# Patient Record
Sex: FEMALE | Race: WHITE | NOT HISPANIC OR LATINO | Employment: FULL TIME | URBAN - METROPOLITAN AREA
[De-identification: names, ages, dates, MRNs, and addresses within clinical notes are randomized per-mention and may not be internally consistent; named-entity substitution may affect disease eponyms.]

---

## 2017-01-26 ENCOUNTER — TRANSCRIBE ORDERS (OUTPATIENT)
Dept: INTERNAL MEDICINE | Facility: CLINIC | Age: 52
End: 2017-01-26

## 2017-01-26 DIAGNOSIS — Z12.31 VISIT FOR SCREENING MAMMOGRAM: Primary | ICD-10-CM

## 2017-02-03 ENCOUNTER — HOSPITAL ENCOUNTER (OUTPATIENT)
Dept: MAMMOGRAPHY | Facility: HOSPITAL | Age: 52
Discharge: HOME | End: 2017-02-03
Admitting: FAMILY MEDICINE

## 2017-02-03 DIAGNOSIS — Z12.31 VISIT FOR SCREENING MAMMOGRAM: ICD-10-CM

## 2017-02-03 PROCEDURE — 77067 SCR MAMMO BI INCL CAD: CPT | Performed by: RADIOLOGY

## 2017-02-03 PROCEDURE — G0202 SCR MAMMO BI INCL CAD: HCPCS

## 2017-02-03 PROCEDURE — 77063 BREAST TOMOSYNTHESIS BI: CPT

## 2017-02-03 PROCEDURE — 77063 BREAST TOMOSYNTHESIS BI: CPT | Performed by: RADIOLOGY

## 2018-07-19 ENCOUNTER — TRANSCRIBE ORDERS (OUTPATIENT)
Dept: ADMINISTRATIVE | Facility: HOSPITAL | Age: 53
End: 2018-07-19

## 2018-07-19 DIAGNOSIS — Z12.31 VISIT FOR SCREENING MAMMOGRAM: Primary | ICD-10-CM

## 2018-07-30 ENCOUNTER — HOSPITAL ENCOUNTER (OUTPATIENT)
Dept: MAMMOGRAPHY | Facility: HOSPITAL | Age: 53
Discharge: HOME | End: 2018-07-30
Admitting: FAMILY MEDICINE

## 2018-07-30 DIAGNOSIS — Z12.31 VISIT FOR SCREENING MAMMOGRAM: ICD-10-CM

## 2018-07-30 PROCEDURE — 77063 BREAST TOMOSYNTHESIS BI: CPT

## 2018-07-30 PROCEDURE — 77067 SCR MAMMO BI INCL CAD: CPT | Performed by: RADIOLOGY

## 2018-07-30 PROCEDURE — 77063 BREAST TOMOSYNTHESIS BI: CPT | Performed by: RADIOLOGY

## 2018-07-30 PROCEDURE — 77067 SCR MAMMO BI INCL CAD: CPT

## 2018-08-07 ENCOUNTER — CONSULT (OUTPATIENT)
Dept: SLEEP MEDICINE | Facility: HOSPITAL | Age: 53
End: 2018-08-07

## 2018-08-07 VITALS
HEART RATE: 82 BPM | HEIGHT: 60 IN | OXYGEN SATURATION: 96 % | SYSTOLIC BLOOD PRESSURE: 120 MMHG | BODY MASS INDEX: 31.33 KG/M2 | WEIGHT: 159.6 LBS | DIASTOLIC BLOOD PRESSURE: 78 MMHG

## 2018-08-07 DIAGNOSIS — E66.9 OBESITY (BMI 30-39.9): ICD-10-CM

## 2018-08-07 DIAGNOSIS — R40.0 SLEEPINESS: Primary | ICD-10-CM

## 2018-08-07 DIAGNOSIS — R29.818 SUSPECTED SLEEP APNEA: ICD-10-CM

## 2018-08-07 PROBLEM — I10 HYPERTENSIVE DISORDER: Status: ACTIVE | Noted: 2017-01-10

## 2018-08-07 PROCEDURE — 99204 OFFICE O/P NEW MOD 45 MIN: CPT | Performed by: INTERNAL MEDICINE

## 2018-08-07 RX ORDER — LOSARTAN POTASSIUM 50 MG/1
TABLET ORAL
COMMUNITY
End: 2020-06-22

## 2018-08-07 RX ORDER — FERROUS SULFATE 325(65) MG
TABLET ORAL
Status: ON HOLD | COMMUNITY
End: 2021-04-22

## 2018-08-07 NOTE — PROGRESS NOTES
Sapna Ng is a 53 y.o. female.   Chief Complaint   Patient presents with   • Sleeping Problem       HPI     53 y.o. female seen in consultation at the request of Referring, Self for evaluation of the above.     She has had increasing daytime somnolence and nonrestorative sleep for several years though it has been significantly worse over the last year.  She has been noted to snore by her family.    Further details are as follows:    [place] Scale is: 6/24    Estimated average amount of sleep per night: 7  Number of times she wakes up at night: 2  Difficulty falling back asleep: sometimes  It usually takes 5 minutes to go to sleep.  She feels sleepy upon waking up: yes  Rotating or night shift work: no    Drowsiness/Sleepiness:  She exhibits the following:  excessive daytime sleepiness  excessive daytime fatigue  falls asleep watching TV  falls asleep during times of the day when she is quiet  difficulty driving due to sleepiness  sleepy even while on vacation  sleepy even when sleep time is increased    Snoring/Breathing:  She exhibits the following:  loud snoring  snores in all sleep positions  awoken with dry mouth    Reflux:  She describes the following:  none    Narcolepsy:  She exhibits the following:  none    RLS/PLMs:  She describes the following:  none    Insomnia:  She describes the following:  frequent awakenings  restless sleep    Parasomnia:  She exhibits the following:  sleep talks    Neurological:  She has a history of the following:  none    Weight:  Weight change in the last year:  gain: 0 lbs      The patient's relevant past medical, surgical, family, and social history reviewed and updated in Epic as appropriate.    Current medications are:   Current Outpatient Prescriptions:   •  Cetirizine HCl (ZYRTEC ALLERGY) 10 MG capsule, Zyrtec 10 mg tablet  Take 1 tablet every day by oral route., Disp: , Rfl:   •  ferrous sulfate (IRON SUPPLEMENT) 325 (65 FE) MG tablet, ferrous sulfate 325 mg (65 mg  "iron) tablet  Take 1 tablet every day by oral route., Disp: , Rfl:   •  losartan (COZAAR) 50 MG tablet, losartan 50 mg tablet, Disp: , Rfl: .    Review of Systems    Review of Systems  ROS documented in patient questionnaire ×12 systems.  Reviewed with patient.  Otherwise negative except as noted in HPI.    Physical Exam    Blood pressure 120/78, pulse 82, height 152.4 cm (60\"), weight 72.4 kg (159 lb 9.6 oz), last menstrual period 07/14/2018, SpO2 96 %. Body mass index is 31.17 kg/m².    Physical Exam   Constitutional: She is oriented to person, place, and time. She appears well-developed and well-nourished.   HENT:   Head: Normocephalic and atraumatic.   Nose: Nose normal.   Mouth/Throat: Oropharynx is clear and moist. No oropharyngeal exudate.   Class IV airway   Eyes: Conjunctivae are normal. No scleral icterus.   Neck: No tracheal deviation present. No thyromegaly present.   Cardiovascular: Normal rate and regular rhythm.  Exam reveals no gallop and no friction rub.    No murmur heard.  Pulmonary/Chest: Effort normal. No respiratory distress. She has no wheezes. She has no rales.   Musculoskeletal: She exhibits no edema or deformity.   Neurological: She is alert and oriented to person, place, and time.   Skin: Skin is warm and dry. No rash noted.   Psychiatric: She has a normal mood and affect. Her behavior is normal.   Nursing note and vitals reviewed.      ASSESSMENT:    Problem List Items Addressed This Visit     Sleepiness - Primary    Relevant Orders    Home Sleep Study    Suspected sleep apnea    Relevant Orders    Home Sleep Study    Obesity (BMI 30-39.9)    Relevant Orders    Home Sleep Study          Suspicion of obstructive sleep apnea based upon snoring and nonrestorative sleep.    PLAN:    1. I discussed the diagnosis of obstructive sleep apnea as well as the diagnostic process and potential treatments with the patient.  2. She is agreeable to a home sleep apnea test, which I have ordered  3. We " discussed long-term weight loss  4. She'll be contacted after the study and appropriate follow-up arranged as well as treatment if needed.    I have reviewed the results of my evaluation and impression and discussed my recommendations in detail with the patient.      Signed by  Laith Borden MD    August 7, 2018      CC: Letty Diaz MD          Referring, Self

## 2018-09-07 ENCOUNTER — HOSPITAL ENCOUNTER (OUTPATIENT)
Dept: SLEEP MEDICINE | Facility: HOSPITAL | Age: 53
Discharge: HOME | End: 2018-09-07
Attending: INTERNAL MEDICINE | Admitting: INTERNAL MEDICINE

## 2018-09-07 VITALS
DIASTOLIC BLOOD PRESSURE: 77 MMHG | HEART RATE: 66 BPM | BODY MASS INDEX: 31.86 KG/M2 | OXYGEN SATURATION: 98 % | WEIGHT: 162.26 LBS | HEIGHT: 60 IN | SYSTOLIC BLOOD PRESSURE: 137 MMHG

## 2018-09-07 DIAGNOSIS — E66.9 OBESITY (BMI 30-39.9): ICD-10-CM

## 2018-09-07 DIAGNOSIS — R29.818 SUSPECTED SLEEP APNEA: ICD-10-CM

## 2018-09-07 DIAGNOSIS — R40.0 SLEEPINESS: ICD-10-CM

## 2018-09-07 PROCEDURE — 95806 SLEEP STUDY UNATT&RESP EFFT: CPT | Performed by: INTERNAL MEDICINE

## 2018-09-07 PROCEDURE — 95806 SLEEP STUDY UNATT&RESP EFFT: CPT

## 2018-09-12 DIAGNOSIS — G47.33 MODERATE OBSTRUCTIVE SLEEP APNEA: Primary | ICD-10-CM

## 2018-09-12 NOTE — PROGRESS NOTES
CALLED PATIENT AND ADVISED OF STUDY RESULTS. PATIENT VERBALIZED UNDERSTANDING AND WAS AGREEABLE TO PAP THERAPY. FAXED ORDER TO JOLLY PEREZ 09/12/18

## 2018-11-14 ENCOUNTER — OFFICE VISIT (OUTPATIENT)
Dept: SLEEP MEDICINE | Facility: HOSPITAL | Age: 53
End: 2018-11-14

## 2018-11-14 VITALS
BODY MASS INDEX: 31.92 KG/M2 | HEART RATE: 64 BPM | OXYGEN SATURATION: 99 % | SYSTOLIC BLOOD PRESSURE: 126 MMHG | HEIGHT: 60 IN | DIASTOLIC BLOOD PRESSURE: 69 MMHG | WEIGHT: 162.6 LBS

## 2018-11-14 DIAGNOSIS — G47.34 NOCTURNAL HYPOXEMIA: ICD-10-CM

## 2018-11-14 DIAGNOSIS — G47.33 OSA (OBSTRUCTIVE SLEEP APNEA): Primary | ICD-10-CM

## 2018-11-14 PROCEDURE — 99214 OFFICE O/P EST MOD 30 MIN: CPT | Performed by: NURSE PRACTITIONER

## 2018-11-14 RX ORDER — MONTELUKAST SODIUM 10 MG/1
10 TABLET ORAL NIGHTLY
Status: ON HOLD | COMMUNITY
End: 2021-04-22

## 2018-11-14 NOTE — PATIENT INSTRUCTIONS
Hypoxemia  Hypoxemia occurs when the blood does not contain enough oxygen. The body cannot work well when it does not have enough oxygen because every part of the body needs oxygen. Oxygen enters the lungs when we breathe in, then it travels to all parts of the body through the blood. Hypoxemia can develop suddenly or slowly.  What are the causes?  Common causes of this condition include:  · Long-term (chronic) lung diseases, such as chronic obstructive pulmonary disease (COPD) or interstitial lung disease.  · Disorders that affect breathing at night, such as sleep apnea.  · Fluid buildup in the lungs (pulmonary edema).  · Lung infection (pneumonia).  · Lung or throat cancer.  · Abnormal blood flow that bypasses the lungs (having a shunt).  · Certain diseases that affect nerves or muscles.  · A collapsed lung (pneumothorax).  · A blood clot in the lungs (pulmonary embolus).  · Certain types of heart disease.  · Slow or shallow breathing (hypoventilation).  · Certain medicines.  · High altitudes.  · Toxic chemicals, smoke, and gases.    What are the signs or symptoms?  In some cases, there may be no symptoms of this condition. If you do have symptoms, they may include:  · Shortness of breath (dyspnea).  · Bluish color of the skin, lips, or nail beds.  · Breathing that is fast, noisy, or shallow.  · A fast heartbeat.  · Feeling tired or sleepy.  · Feeling confused or worried.    If hypoxemia develops quickly, you will likely have dyspnea. If hypoxemia develops slowly over months or years, you may not notice any symptoms.  How is this diagnosed?  This condition is diagnosed by:  · A physical exam.  · Blood tests.  · A test that measures the percentage of oxygen in your blood (pulse oximetry). This is done with a sensor that is placed on your finger, toe, or earlobe.    How is this treated?  Treatment for this condition depends on the underlying cause of your hypoxemia. You will likely be treated with oxygen therapy to  restore your blood oxygen level. Depending on the cause of your hypoxemia, you may need oxygen therapy for a short time (weeks or months), or you may need it for the rest of your life.  Your health care provider may also recommend other therapies to treat the underlying cause of your hypoxemia.  Follow these instructions at home:  · Take over-the-counter and prescription medicines only as told by your health care provider.  · If you are on oxygen therapy, follow oxygen safety precautions as directed by your health care provider. These may include:  ? Always having a backup supply of oxygen.  ? Not allowing anyone to smoke or have a fire around oxygen.  ? Handling oxygen tanks carefully and as instructed.  · Do not use any products that contain nicotine or tobacco, such as cigarettes and e-cigarettes. If you need help quitting, ask your health care provider. Stay away from people who smoke.  · Keep all follow-up visits as told by your health care provider. This is important.  Contact a health care provider if:  · You have any concerns about your oxygen therapy.  · You have trouble breathing, even during or after treatment.  · You become short of breath when you exercise.  · You are tired when you wake up.  · You have a headache when you wake up.  Get help right away if:  · Your shortness of breath gets worse, especially with normal or minimal activity.  · You have a bluish color of the skin, lips, or nail beds.  · You become confused or you cannot think properly.  · You cough up dark mucus or blood.  · You have chest pain.  · You have a fever.  Summary  · Hypoxemia occurs when the blood does not contain enough oxygen.  · Hypoxemia may or may not cause symptoms. Often, the main symptom is shortness of breath (dyspnea).  · Depending on the cause of your hypoxemia, you may need oxygen therapy for a short time (weeks or months), or you may need it for the rest of your life.  · If you are on oxygen therapy, follow oxygen  safety precautions as directed by your health care provider.  This information is not intended to replace advice given to you by your health care provider. Make sure you discuss any questions you have with your health care provider.  Document Released: 07/02/2012 Document Revised: 11/21/2017 Document Reviewed: 11/21/2017  [employer] Interactive Patient Education © 2017 [employer]  Sleep Apnea  Sleep apnea is a condition that affects breathing. People with sleep apnea have moments during sleep when their breathing pauses briefly or gets shallow. Sleep apnea can cause these symptoms:  · Trouble staying asleep.  · Sleepiness or tiredness during the day.  · Irritability.  · Loud snoring.  · Morning headaches.  · Trouble concentrating.  · Forgetting things.  · Less interest in sex.  · Being sleepy for no reason.  · Mood swings.  · Personality changes.  · Depression.  · Waking up a lot during the night to pee (urinate).  · Dry mouth.  · Sore throat.    Follow these instructions at home:  · Make any changes in your routine that your doctor recommends.  · Eat a healthy, well-balanced diet.  · Take over-the-counter and prescription medicines only as told by your doctor.  · Avoid using alcohol, calming medicines (sedatives), and narcotic medicines.  · Take steps to lose weight if you are overweight.  · If you were given a machine (device) to use while you sleep, use it only as told by your doctor.  · Do not use any tobacco products, such as cigarettes, chewing tobacco, and e-cigarettes. If you need help quitting, ask your doctor.  · Keep all follow-up visits as told by your doctor. This is important.  Contact a doctor if:  · The machine that you were given to use during sleep is uncomfortable or does not seem to be working.  · Your symptoms do not get better.  · Your symptoms get worse.  Get help right away if:  · Your chest hurts.  · You have trouble breathing in enough air (shortness of breath).  · You have an  uncomfortable feeling in your back, arms, or stomach.  · You have trouble talking.  · One side of your body feels weak.  · A part of your face is hanging down (drooping).  These symptoms may be an emergency. Do not wait to see if the symptoms will go away. Get medical help right away. Call your local emergency services (911 in the [place] Do not drive yourself to the hospital.  This information is not intended to replace advice given to you by your health care provider. Make sure you discuss any questions you have with your health care provider.  Document Released: 09/26/2009 Document Revised: 08/13/2017 Document Reviewed: 09/26/2016  [employer] Interactive Patient Education © 2018 [employer]

## 2018-11-14 NOTE — PROGRESS NOTES
Subjective:   Follow-up      Chief Complaint:   Chief Complaint   Patient presents with   • Follow-up       HPI:    Sapna Ng is a 53 y.o. female here for follow-up of ayanna.  Patient was seen here in consult 8/7/18.  She was having excessive sleepiness and snoring.  She did have study on 9/8/18 that showed obstructive sleep apnea as well as 6 minutes of nocturnal hypoxemia at 85% saturation.  She is sleeping 6-8 hours nightly and her [place] score is 4/24.  Patient states she feels 100% better since starting CPAP therapy and no longer feels foggy upon awakening.  She does not nap.  She does not snore.  She has happy at current settings and wishes to continue CPAP therapy.        Current medications are:   Current Outpatient Medications:   •  Cetirizine HCl (ZYRTEC ALLERGY) 10 MG capsule, Zyrtec 10 mg tablet  Take 1 tablet every day by oral route., Disp: , Rfl:   •  ferrous sulfate (IRON SUPPLEMENT) 325 (65 FE) MG tablet, ferrous sulfate 325 mg (65 mg iron) tablet  Take 1 tablet every day by oral route., Disp: , Rfl:   •  losartan (COZAAR) 50 MG tablet, losartan 50 mg tablet, Disp: , Rfl:   •  montelukast (SINGULAIR) 10 MG tablet, Take 10 mg by mouth Every Night., Disp: , Rfl: .      The patient's relevant past medical, surgical, family and social history were reviewed and updated in Epic as appropriate.       Review of Systems   Eyes: Positive for visual disturbance.   Respiratory: Positive for apnea.    Allergic/Immunologic: Positive for environmental allergies.   Psychiatric/Behavioral: Positive for sleep disturbance.   All other systems reviewed and are negative.        Objective:    Physical Exam   Constitutional: She appears well-developed and well-nourished.   HENT:   Head: Normocephalic and atraumatic.   Mouth/Throat: Oropharynx is clear and moist.   Mallampati 4 anatomy   Eyes: Conjunctivae are normal.   Cardiovascular: Normal rate and regular rhythm.   Pulmonary/Chest: Effort normal and breath sounds  normal.   Skin: Skin is warm and dry.   Psychiatric: She has a normal mood and affect. Her behavior is normal. Judgment and thought content normal.   Nursing note and vitals reviewed.  Download and compliance has been reviewed with patient.  Her AHI is controlled 0.6.  Greater than 4 hour usage 56.7%.  90% pressure 9.8 cm H2O.      ASSESSMENT/PLAN    Sapna was seen today for follow-up.    Diagnoses and all orders for this visit:    DORITA (obstructive sleep apnea)  -     CPAP Therapy  -     Overnight Sleep Oximetry Study; Future    Nocturnal hypoxemia  -     Overnight Sleep Oximetry Study; Future            1. Counseled patient regarding multimodal approach with healthy nutrition, healthy sleep, regular physical activity, social activities, counseling, and medications. Encouraged to practice lateral sleep position. Avoid alcohol and sedatives close to bedtime.  2.   Refill supplies ×1 year.  Order for 24-hour pulse oximetry to make sure nocturnal hypoxemia has been corrected.  I will call her with those results.  I will see patient back in 6 months to reassess download and compliance.  Encouraged increased use of CPAP therapy over 4 hours.  I have reviewed the results of my evaluation and impression and discussed my recommendations in detail with the patient.      Signed by  Jennie Live, APRN    November 14, 2018      CC: Letty Diaz MD          No ref. provider found

## 2018-11-21 DIAGNOSIS — G47.34 NOCTURNAL HYPOXEMIA: ICD-10-CM

## 2018-11-21 DIAGNOSIS — G47.33 OSA (OBSTRUCTIVE SLEEP APNEA): ICD-10-CM

## 2018-11-26 ENCOUNTER — TELEPHONE (OUTPATIENT)
Dept: SLEEP MEDICINE | Facility: HOSPITAL | Age: 53
End: 2018-11-26

## 2018-11-26 NOTE — TELEPHONE ENCOUNTER
----- Message from WESTLEY العراقي sent at 11/26/2018  9:42 AM EST -----  cpap has resolved low night time oxygen

## 2018-12-03 ENCOUNTER — TELEPHONE (OUTPATIENT)
Dept: SLEEP MEDICINE | Facility: HOSPITAL | Age: 53
End: 2018-12-03

## 2019-06-20 ENCOUNTER — OFFICE VISIT (OUTPATIENT)
Dept: SLEEP MEDICINE | Facility: HOSPITAL | Age: 54
End: 2019-06-20

## 2019-06-20 VITALS
BODY MASS INDEX: 32.47 KG/M2 | SYSTOLIC BLOOD PRESSURE: 105 MMHG | DIASTOLIC BLOOD PRESSURE: 68 MMHG | OXYGEN SATURATION: 97 % | WEIGHT: 165.4 LBS | HEART RATE: 68 BPM | HEIGHT: 60 IN

## 2019-06-20 DIAGNOSIS — G47.33 OSA (OBSTRUCTIVE SLEEP APNEA): Primary | ICD-10-CM

## 2019-06-20 PROCEDURE — 99212 OFFICE O/P EST SF 10 MIN: CPT | Performed by: NURSE PRACTITIONER

## 2019-06-20 NOTE — PATIENT INSTRUCTIONS

## 2019-06-20 NOTE — PROGRESS NOTES
Subjective: Follow-up        Chief Complaint:   Chief Complaint   Patient presents with   • Follow-up       HPI:    Sapna Ng is a 53 y.o. female here for follow-up of obstructive sleep apnea.  Patient was last seen 11/14/2018.  Patient states she is doing well with CPAP therapy.  Patient is sleeping 7 hours nightly and feels refreshed upon awakening.  Patient has an [place] score of 4/24.  Patient is doing well with current settings and has no concerns today.  Of note patient has not changed her mask in approximately 9 months.  She does know that she will need to do this on a regular basis.        Current medications are:   Current Outpatient Medications:   •  Cetirizine HCl (ZYRTEC ALLERGY) 10 MG capsule, Zyrtec 10 mg tablet  Take 1 tablet every day by oral route., Disp: , Rfl:   •  ferrous sulfate (IRON SUPPLEMENT) 325 (65 FE) MG tablet, ferrous sulfate 325 mg (65 mg iron) tablet  Take 1 tablet every day by oral route., Disp: , Rfl:   •  losartan (COZAAR) 50 MG tablet, losartan 50 mg tablet, Disp: , Rfl:   •  montelukast (SINGULAIR) 10 MG tablet, Take 10 mg by mouth Every Night., Disp: , Rfl: .      The patient's relevant past medical, surgical, family and social history were reviewed and updated in Epic as appropriate.       Review of Systems   HENT: Positive for postnasal drip.    Eyes: Positive for visual disturbance.   Respiratory: Positive for apnea.    Allergic/Immunologic: Positive for environmental allergies.   Psychiatric/Behavioral: Positive for sleep disturbance.   All other systems reviewed and are negative.        Objective:    Physical Exam   Constitutional: She is oriented to person, place, and time. She appears well-developed and well-nourished.   HENT:   Head: Normocephalic and atraumatic.   Right Ear: External ear normal.   Left Ear: External ear normal.   Mouth/Throat: Oropharynx is clear and moist.   Mallampati 4 anatomy   Eyes: Conjunctivae are normal.   Neck: Neck supple. No  thyromegaly present.   Cardiovascular: Normal rate and regular rhythm.   Pulmonary/Chest: Effort normal and breath sounds normal.   Lymphadenopathy:     She has no cervical adenopathy.   Neurological: She is alert and oriented to person, place, and time.   Skin: Skin is warm and dry.   Psychiatric: She has a normal mood and affect. Her behavior is normal. Judgment and thought content normal.   Nursing note and vitals reviewed.    140/1 80 days of use.  Greater than 4-hour use 67.2%.  90% pressure 9.7.  AHI of 0.8.  Download reviewed with patient.    ASSESSMENT/PLAN    Sapna was seen today for follow-up.    Diagnoses and all orders for this visit:    DORITA (obstructive sleep apnea)  -     CPAP Therapy            1. Counseled patient regarding multimodal approach with healthy nutrition, healthy sleep, regular physical activity, social activities, counseling, and medications. Encouraged to practice sleep refill supplies x1 year.  Return to clinic 1 year or sooner if symptoms warrant.  Patient does need to change her mask on a regular basis.  Increase over 4-hour use.  Sleep position. Avoid alcohol and sedatives close to bedtime.  2.     I have reviewed the results of my evaluation and impression and discussed my recommendations in detail with the patient.      Signed by  WESTLEY Wong    June 20, 2019      CC: Letty Diaz MD          No ref. provider found

## 2019-10-21 ENCOUNTER — TRANSCRIBE ORDERS (OUTPATIENT)
Dept: ADMINISTRATIVE | Facility: HOSPITAL | Age: 54
End: 2019-10-21

## 2019-10-21 DIAGNOSIS — Z12.31 VISIT FOR SCREENING MAMMOGRAM: Primary | ICD-10-CM

## 2019-12-11 ENCOUNTER — HOSPITAL ENCOUNTER (OUTPATIENT)
Dept: MAMMOGRAPHY | Facility: HOSPITAL | Age: 54
Discharge: HOME | End: 2019-12-11
Admitting: FAMILY MEDICINE

## 2019-12-11 DIAGNOSIS — Z12.31 VISIT FOR SCREENING MAMMOGRAM: ICD-10-CM

## 2019-12-11 PROCEDURE — 77063 BREAST TOMOSYNTHESIS BI: CPT | Performed by: RADIOLOGY

## 2019-12-11 PROCEDURE — 77067 SCR MAMMO BI INCL CAD: CPT

## 2019-12-11 PROCEDURE — 77063 BREAST TOMOSYNTHESIS BI: CPT

## 2019-12-11 PROCEDURE — 77067 SCR MAMMO BI INCL CAD: CPT | Performed by: RADIOLOGY

## 2020-06-22 ENCOUNTER — TELEMEDICINE (OUTPATIENT)
Dept: SLEEP MEDICINE | Facility: HOSPITAL | Age: 55
End: 2020-06-22

## 2020-06-22 VITALS — BODY MASS INDEX: 32.39 KG/M2 | HEIGHT: 60 IN | WEIGHT: 165 LBS

## 2020-06-22 DIAGNOSIS — G47.33 OSA (OBSTRUCTIVE SLEEP APNEA): Primary | ICD-10-CM

## 2020-06-22 PROCEDURE — 99212 OFFICE O/P EST SF 10 MIN: CPT | Performed by: NURSE PRACTITIONER

## 2020-06-22 NOTE — PROGRESS NOTES
Chief Complaint:   Chief Complaint   Patient presents with   • Follow-up       HPI:    Sapna Ng is a 54 y.o. female here for follow-up of sleep apnea.  Patient was last seen 5/20/2019.  Patient states she is doing well with CPAP therapy.  Patient is sleeping 7+ hours nightly and does feel refreshed upon awakening.  It will take patient less than 5 minutes to go to sleep and she does not get up during the night.  Patient has an [place] score of 4/24.  Patient has no complaints of skin irritation from the mask, difficulty putting mask on or off, no problem with air leak, no dry mucosa, or no difficulty breathing/shortness of breath while wearing CPAP.  Patient does wish to continue with CPAP therapy.  Past Medical History:   Diagnosis Date   • PPD positive, treated            Current medications are:   Current Outpatient Medications:   •  Cetirizine HCl (ZYRTEC ALLERGY) 10 MG capsule, Zyrtec 10 mg tablet  Take 1 tablet every day by oral route., Disp: , Rfl:   •  ferrous sulfate (IRON SUPPLEMENT) 325 (65 FE) MG tablet, ferrous sulfate 325 mg (65 mg iron) tablet  Take 1 tablet every day by oral route., Disp: , Rfl:   •  montelukast (SINGULAIR) 10 MG tablet, Take 10 mg by mouth Every Night., Disp: , Rfl: .      The patient's relevant past medical, surgical, family and social history were reviewed and updated in Epic as appropriate.       Review of Systems   Eyes: Positive for visual disturbance.   Respiratory: Positive for apnea.    Allergic/Immunologic: Positive for environmental allergies.   Psychiatric/Behavioral: Positive for sleep disturbance.   All other systems reviewed and are negative.        Objective:    Physical Exam   Constitutional: She is oriented to person, place, and time. She appears well-developed and well-nourished.   HENT:   Head: Normocephalic and atraumatic.   Class 4 airway   Neck: No tracheal deviation present.   Pulmonary/Chest: Effort normal.   Neurological: She is alert and  oriented to person, place, and time.   Skin: No erythema. No pallor.   Psychiatric: She has a normal mood and affect. Her behavior is normal. Judgment and thought content normal.     88/90 days of use.  Greater than 4-hour use 90%.  90% pressure 10.0.  AHI 0.6.  Settings 8 to 20 cm H2O.  Download reviewed with patient.    ASSESSMENT/PLAN    Sapna was seen today for follow-up.    Diagnoses and all orders for this visit:    DORITA (obstructive sleep apnea)  -     CPAP Therapy            1. Counseled patient regarding multimodal approach with healthy nutrition, healthy sleep, regular physical activity, social activities, counseling, and medications. Encouraged to practice  lateralsleep position. Avoid alcohol and sedatives close to bedtime.  2. Refill supplies x1 year.  Return to clinic 1 year or sooner if symptoms warrant.  3. Verbal consent was given we did move forward with video visit.    I have reviewed the results of my evaluation and impression and discussed my recommendations in detail with the patient.      Signed by  WESTLEY Wong    June 22, 2020      CC: Letty Diaz MD          No ref. provider found

## 2021-01-12 ENCOUNTER — TRANSCRIBE ORDERS (OUTPATIENT)
Dept: ADMINISTRATIVE | Facility: HOSPITAL | Age: 56
End: 2021-01-12

## 2021-01-12 DIAGNOSIS — Z12.31 VISIT FOR SCREENING MAMMOGRAM: Primary | ICD-10-CM

## 2021-01-14 ENCOUNTER — APPOINTMENT (OUTPATIENT)
Dept: MAMMOGRAPHY | Facility: HOSPITAL | Age: 56
End: 2021-01-14

## 2021-04-21 ENCOUNTER — HOSPITAL ENCOUNTER (EMERGENCY)
Facility: HOSPITAL | Age: 56
Discharge: HOME | End: 2021-04-21
Attending: EMERGENCY MEDICINE | Admitting: EMERGENCY MEDICINE

## 2021-04-21 ENCOUNTER — APPOINTMENT (OUTPATIENT)
Dept: GENERAL RADIOLOGY | Facility: HOSPITAL | Age: 56
End: 2021-04-21

## 2021-04-21 VITALS
RESPIRATION RATE: 18 BRPM | BODY MASS INDEX: 32.39 KG/M2 | WEIGHT: 165 LBS | HEIGHT: 60 IN | DIASTOLIC BLOOD PRESSURE: 77 MMHG | HEART RATE: 77 BPM | TEMPERATURE: 99 F | OXYGEN SATURATION: 98 % | SYSTOLIC BLOOD PRESSURE: 141 MMHG

## 2021-04-21 DIAGNOSIS — I10 ELEVATED BLOOD PRESSURE READING WITH DIAGNOSIS OF HYPERTENSION: ICD-10-CM

## 2021-04-21 DIAGNOSIS — R07.9 CHEST PAIN, UNSPECIFIED TYPE: ICD-10-CM

## 2021-04-21 DIAGNOSIS — R07.2 PRECORDIAL PAIN: Primary | ICD-10-CM

## 2021-04-21 DIAGNOSIS — Z82.49 FAMILY HISTORY OF CORONARY ARTERY DISEASE: ICD-10-CM

## 2021-04-21 PROBLEM — E78.2 MIXED HYPERLIPIDEMIA: Status: ACTIVE | Noted: 2021-04-21

## 2021-04-21 PROBLEM — R93.89 ABNORMAL CHEST X-RAY: Status: ACTIVE | Noted: 2021-04-21

## 2021-04-21 LAB
ALBUMIN SERPL-MCNC: 4.2 G/DL (ref 3.5–5.2)
ALBUMIN/GLOB SERPL: 1.6 G/DL
ALP SERPL-CCNC: 61 U/L (ref 39–117)
ALT SERPL W P-5'-P-CCNC: 45 U/L (ref 1–33)
ANION GAP SERPL CALCULATED.3IONS-SCNC: 6 MMOL/L (ref 5–15)
AST SERPL-CCNC: 29 U/L (ref 1–32)
BASOPHILS # BLD AUTO: 0.03 10*3/MM3 (ref 0–0.2)
BASOPHILS NFR BLD AUTO: 0.4 % (ref 0–1.5)
BILIRUB SERPL-MCNC: 0.3 MG/DL (ref 0–1.2)
BUN SERPL-MCNC: 12 MG/DL (ref 6–20)
BUN/CREAT SERPL: 14.8 (ref 7–25)
CALCIUM SPEC-SCNC: 8.8 MG/DL (ref 8.6–10.5)
CHLORIDE SERPL-SCNC: 104 MMOL/L (ref 98–107)
CO2 SERPL-SCNC: 28 MMOL/L (ref 22–29)
CREAT SERPL-MCNC: 0.81 MG/DL (ref 0.57–1)
DEPRECATED RDW RBC AUTO: 39.4 FL (ref 37–54)
EOSINOPHIL # BLD AUTO: 0.2 10*3/MM3 (ref 0–0.4)
EOSINOPHIL NFR BLD AUTO: 2.8 % (ref 0.3–6.2)
ERYTHROCYTE [DISTWIDTH] IN BLOOD BY AUTOMATED COUNT: 11.8 % (ref 12.3–15.4)
GFR SERPL CREATININE-BSD FRML MDRD: 73 ML/MIN/1.73
GLOBULIN UR ELPH-MCNC: 2.6 GM/DL
GLUCOSE SERPL-MCNC: 98 MG/DL (ref 65–99)
HCT VFR BLD AUTO: 32.8 % (ref 34–46.6)
HGB BLD-MCNC: 11.4 G/DL (ref 12–15.9)
HOLD SPECIMEN: NORMAL
IMM GRANULOCYTES # BLD AUTO: 0.03 10*3/MM3 (ref 0–0.05)
IMM GRANULOCYTES NFR BLD AUTO: 0.4 % (ref 0–0.5)
LIPASE SERPL-CCNC: 32 U/L (ref 13–60)
LYMPHOCYTES # BLD AUTO: 1.41 10*3/MM3 (ref 0.7–3.1)
LYMPHOCYTES NFR BLD AUTO: 19.8 % (ref 19.6–45.3)
MCH RBC QN AUTO: 31.8 PG (ref 26.6–33)
MCHC RBC AUTO-ENTMCNC: 34.8 G/DL (ref 31.5–35.7)
MCV RBC AUTO: 91.4 FL (ref 79–97)
MONOCYTES # BLD AUTO: 0.61 10*3/MM3 (ref 0.1–0.9)
MONOCYTES NFR BLD AUTO: 8.6 % (ref 5–12)
NEUTROPHILS NFR BLD AUTO: 4.83 10*3/MM3 (ref 1.7–7)
NEUTROPHILS NFR BLD AUTO: 68 % (ref 42.7–76)
NRBC BLD AUTO-RTO: 0 /100 WBC (ref 0–0.2)
NT-PROBNP SERPL-MCNC: 26.9 PG/ML (ref 0–900)
PLATELET # BLD AUTO: 271 10*3/MM3 (ref 140–450)
PMV BLD AUTO: 11.3 FL (ref 6–12)
POTASSIUM SERPL-SCNC: 3.8 MMOL/L (ref 3.5–5.2)
PROT SERPL-MCNC: 6.8 G/DL (ref 6–8.5)
QT INTERVAL: 394 MS
QT INTERVAL: 408 MS
QTC INTERVAL: 425 MS
QTC INTERVAL: 433 MS
RBC # BLD AUTO: 3.59 10*6/MM3 (ref 3.77–5.28)
SODIUM SERPL-SCNC: 138 MMOL/L (ref 136–145)
TROPONIN T SERPL-MCNC: <0.01 NG/ML (ref 0–0.03)
TROPONIN T SERPL-MCNC: <0.01 NG/ML (ref 0–0.03)
WBC # BLD AUTO: 7.11 10*3/MM3 (ref 3.4–10.8)
WHOLE BLOOD HOLD SPECIMEN: NORMAL
WHOLE BLOOD HOLD SPECIMEN: NORMAL

## 2021-04-21 PROCEDURE — 84484 ASSAY OF TROPONIN QUANT: CPT

## 2021-04-21 PROCEDURE — 84484 ASSAY OF TROPONIN QUANT: CPT | Performed by: EMERGENCY MEDICINE

## 2021-04-21 PROCEDURE — 85025 COMPLETE CBC W/AUTO DIFF WBC: CPT

## 2021-04-21 PROCEDURE — 71045 X-RAY EXAM CHEST 1 VIEW: CPT

## 2021-04-21 PROCEDURE — 83880 ASSAY OF NATRIURETIC PEPTIDE: CPT

## 2021-04-21 PROCEDURE — 93005 ELECTROCARDIOGRAM TRACING: CPT | Performed by: EMERGENCY MEDICINE

## 2021-04-21 PROCEDURE — 99284 EMERGENCY DEPT VISIT MOD MDM: CPT | Performed by: INTERNAL MEDICINE

## 2021-04-21 PROCEDURE — 99284 EMERGENCY DEPT VISIT MOD MDM: CPT

## 2021-04-21 PROCEDURE — 83690 ASSAY OF LIPASE: CPT

## 2021-04-21 PROCEDURE — 80053 COMPREHEN METABOLIC PANEL: CPT

## 2021-04-21 PROCEDURE — 93005 ELECTROCARDIOGRAM TRACING: CPT

## 2021-04-21 RX ORDER — ASPIRIN 81 MG/1
81 TABLET ORAL DAILY
Start: 2021-04-21 | End: 2021-05-06 | Stop reason: SDUPTHER

## 2021-04-21 RX ORDER — SODIUM CHLORIDE 0.9 % (FLUSH) 0.9 %
10 SYRINGE (ML) INJECTION AS NEEDED
Status: DISCONTINUED | OUTPATIENT
Start: 2021-04-21 | End: 2021-04-21 | Stop reason: HOSPADM

## 2021-04-21 RX ORDER — NITROGLYCERIN 0.4 MG/1
TABLET SUBLINGUAL
Qty: 25 TABLET | Refills: 0 | Status: ON HOLD | OUTPATIENT
Start: 2021-04-21 | End: 2021-04-22

## 2021-04-21 RX ORDER — NITROGLYCERIN 0.4 MG/1
0.4 TABLET SUBLINGUAL
Status: DISCONTINUED | OUTPATIENT
Start: 2021-04-21 | End: 2021-04-21 | Stop reason: HOSPADM

## 2021-04-21 RX ORDER — CARVEDILOL 3.12 MG/1
3.12 TABLET ORAL 2 TIMES DAILY
Qty: 60 TABLET | Refills: 11 | Status: SHIPPED | OUTPATIENT
Start: 2021-04-21 | End: 2021-05-06 | Stop reason: SDUPTHER

## 2021-04-21 RX ORDER — ASPIRIN 81 MG/1
324 TABLET, CHEWABLE ORAL ONCE
Status: DISCONTINUED | OUTPATIENT
Start: 2021-04-21 | End: 2021-04-21 | Stop reason: HOSPADM

## 2021-04-21 NOTE — ED PROVIDER NOTES
Subjective   The patient presents to the emergency department with chest tightness which she noted as an episode last night and then another episode this morning around 11 AM.  She had some diaphoresis and shortness of air with these episodes.  She denies anything specific that initiated or improved the symptoms.  She was previously diagnosed with hypertension though she is not currently taking any medication.  Patient is not a smoker.  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  Patient does have a significant family history with both her father and brother dying of cardiac disease in their late 40s.  Patient has never had a cardiac evaluation.      History provided by:  Patient      Review of Systems   Constitutional: Positive for diaphoresis.   Respiratory: Positive for chest tightness and shortness of breath.    Cardiovascular: Positive for chest pain.   Gastrointestinal: Negative.    Musculoskeletal: Negative.    Skin: Negative.    Neurological: Negative.    Psychiatric/Behavioral: Negative.    All other systems reviewed and are negative.      Past Medical History:   Diagnosis Date   • PPD positive, treated        Allergies   Allergen Reactions   • Penicillins Itching       Past Surgical History:   Procedure Laterality Date   • LAPAROSCOPIC TUBAL LIGATION         Family History   Problem Relation Age of Onset   • Breast cancer Mother 70   • Breast cancer Maternal Aunt 80   • Breast cancer Maternal Aunt         approx age 30's   • Ovarian cancer Neg Hx        Social History     Socioeconomic History   • Marital status:      Spouse name: Not on file   • Number of children: Not on file   • Years of education: Not on file   • Highest education level: Not on file   Tobacco Use   • Smoking status: Never Smoker   • Smokeless tobacco: Never Used   Substance and Sexual Activity   • Alcohol use: Yes     Comment: OCCASSIONALLY   • Drug use:  No   • Sexual activity: Defer           Objective   Physical Exam  Vitals and nursing note reviewed.   Constitutional:       General: She is not in acute distress.     Appearance: She is well-developed. She is obese. She is not ill-appearing, toxic-appearing or diaphoretic.   HENT:      Head: Normocephalic and atraumatic.   Cardiovascular:      Rate and Rhythm: Normal rate and regular rhythm.      Heart sounds: Normal heart sounds.   Pulmonary:      Effort: Pulmonary effort is normal.      Breath sounds: Normal breath sounds. No decreased breath sounds or wheezing.   Abdominal:      Palpations: Abdomen is soft.      Tenderness: There is no abdominal tenderness. There is no guarding.   Musculoskeletal:         General: Normal range of motion.      Right lower leg: No tenderness. No edema.      Left lower leg: No tenderness. No edema.   Skin:     General: Skin is warm and dry.      Capillary Refill: Capillary refill takes less than 2 seconds.   Neurological:      General: No focal deficit present.      Mental Status: She is alert and oriented to person, place, and time.   Psychiatric:         Mood and Affect: Mood normal.         Behavior: Behavior normal.         Procedures           ED Course  ED Course as of Apr 21 2008 Wed Apr 21, 2021   1457 Troponin T: <0.010 [RS]   1457 proBNP: 26.9 [RS]   1510 Cardiology paged.  They will come see the patient.    [RS]   1611 Troponin T: <0.010 [RS]   1612 Patient evaluated in the emergency department by cardiology who will discharge the patient home to follow-up for outpatient stress test.  I do advise starting Coreg, aspirin, and nitroglycerin.    [RS]   1613 I had a discussion with the patient/family regarding diagnosis, diagnostic results, treatment plan, and medications.  The patient/family indicated understanding of these instructions.  I spent adequate time at the bedside proceeding discharge necessary to personally discuss the aftercare instructions, giving patient  education, providing explanations of the results of our evaluations/findings, and my decision making to assure that the patient/family understand the plan of care.  Time was allotted to answer questions at that time and throughout the ED course.  Emphasis was placed on timely follow-up after discharge.  I also discussed the potential for the development of an acute emergent condition requiring further evaluation, admission, or even surgical intervention. I discussed that we found nothing during the visit today indicating the need for further workup, admission, or the presence of an unstable medical condition.  I encouraged the patient to return to the emergency department immediately for ANY concerns, worsening, new complaints, or if symptoms persist and unable to seek follow-up in a timely fashion.  The patient/family expressed understanding and agreement with this plan.     [RS]      ED Course User Index  [RS] Sg Mcclendon MD                                         HEART Score (for prediction of 6-week risk of major adverse cardiac event) reviewed and/or performed as part of the patient evaluation and treatment planning process.  The result associated with this review/performance is: 4       MDM  Number of Diagnoses or Management Options  Chest pain, unspecified type  Elevated blood pressure reading with diagnosis of hypertension  Family history of coronary artery disease  Diagnosis management comments: Recent Results (from the past 24 hour(s))  -ECG 12 Lead  Collection Time: 04/21/21  1:09 PM       Result                      Value             Ref Range           QT Interval                 394               ms                  QTC Interval                425               ms             -Troponin  Collection Time: 04/21/21  1:11 PM  Specimen: Blood       Result                      Value             Ref Range           Troponin T                  <0.010            0.000 - 0.03*  -Comprehensive Metabolic  Panel  Collection Time: 04/21/21  1:11 PM  Specimen: Blood       Result                      Value             Ref Range           Glucose                     98                65 - 99 mg/dL       BUN                         12                6 - 20 mg/dL        Creatinine                  0.81              0.57 - 1.00 *       Sodium                      138               136 - 145 mm*       Potassium                   3.8               3.5 - 5.2 mm*       Chloride                    104               98 - 107 mmo*       CO2                         28.0              22.0 - 29.0 *       Calcium                     8.8               8.6 - 10.5 m*       Total Protein               6.8               6.0 - 8.5 g/*       Albumin                     4.20              3.50 - 5.20 *       ALT (SGPT)                  45 (H)            1 - 33 U/L          AST (SGOT)                  29                1 - 32 U/L          Alkaline Phosphatase        61                39 - 117 U/L        Total Bilirubin             0.3               0.0 - 1.2 mg*       eGFR Non  Amer       73                >60 mL/min/1*       Globulin                    2.6               gm/dL               A/G Ratio                   1.6               g/dL                BUN/Creatinine Ratio        14.8              7.0 - 25.0          Anion Gap                   6.0               5.0 - 15.0 m*  -Lipase  Collection Time: 04/21/21  1:11 PM  Specimen: Blood       Result                      Value             Ref Range           Lipase                      32                13 - 60 U/L    -BNP  Collection Time: 04/21/21  1:11 PM  Specimen: Blood       Result                      Value             Ref Range           proBNP                      26.9              0.0 - 900.0 *  -Light Blue Top  Collection Time: 04/21/21  1:11 PM       Result                      Value             Ref Range           Extra Tube                                                     hold for add-on  -Green Top (Gel)  Collection Time: 04/21/21  1:11 PM       Result                      Value             Ref Range           Extra Tube                                                    Hold for add-ons.  -Lavender Top  Collection Time: 04/21/21  1:11 PM       Result                      Value             Ref Range           Extra Tube                                                    hold for add-on  -Gold Top - SST  Collection Time: 04/21/21  1:11 PM       Result                      Value             Ref Range           Extra Tube                                                    Hold for add-ons.  -Gray Top  Collection Time: 04/21/21  1:11 PM       Result                      Value             Ref Range           Extra Tube                                                    Hold for add-ons.  -CBC Auto Differential  Collection Time: 04/21/21  1:11 PM  Specimen: Blood       Result                      Value             Ref Range           WBC                         7.11              3.40 - 10.80*       RBC                         3.59 (L)          3.77 - 5.28 *       Hemoglobin                  11.4 (L)          12.0 - 15.9 *       Hematocrit                  32.8 (L)          34.0 - 46.6 %       MCV                         91.4              79.0 - 97.0 *       MCH                         31.8              26.6 - 33.0 *       MCHC                        34.8              31.5 - 35.7 *       RDW                         11.8 (L)          12.3 - 15.4 %       RDW-SD                      39.4              37.0 - 54.0 *       MPV                         11.3              6.0 - 12.0 fL       Platelets                   271               140 - 450 10*       Neutrophil %                68.0              42.7 - 76.0 %       Lymphocyte %                19.8              19.6 - 45.3 %       Monocyte %                  8.6               5.0 - 12.0 %        Eosinophil %                2.8                0.3 - 6.2 %         Basophil %                  0.4               0.0 - 1.5 %         Immature Grans %            0.4               0.0 - 0.5 %         Neutrophils, Absolute       4.83              1.70 - 7.00 *       Lymphocytes, Absolute       1.41              0.70 - 3.10 *       Monocytes, Absolute         0.61              0.10 - 0.90 *       Eosinophils, Absolute       0.20              0.00 - 0.40 *       Basophils, Absolute         0.03              0.00 - 0.20 *       Immature Grans, Absolu*     0.03              0.00 - 0.05 *       nRBC                        0.0               0.0 - 0.2 /1*  -ECG 12 Lead  Collection Time: 04/21/21  3:19 PM       Result                      Value             Ref Range           QT Interval                 408               ms                  QTC Interval                433               ms             -Troponin  Collection Time: 04/21/21  3:24 PM  Specimen: Blood       Result                      Value             Ref Range           Troponin T                  <0.010            0.000 - 0.03*  Note: In addition to lab results from this visit, the labs listed above may include labs taken at another facility or during a different encounter within the last 24 hours. Please correlate lab times with ED admission and discharge times for further clarification of the services performed during this visit.    XR Chest 1 View   Preliminary Result    1. Mild cardiomegaly and pulmonary venous hypertension.    2. Mild diffuse bronchial thickening which may be either acute or    chronic.    3. 12 mm nodule or nodular scar in the right midlung or possibly    sclerotic rib lesion. If this is not known from outside studies,    consider follow-up imaging, whether initially with standard two-view    chest radiograph, or with chest CT scan..                -----------------------------------------------------            04/21/21 04/21/21 04/21/21 04/21/21               5672       1500      1530      1625     -----------------------------------------------------   BP:       125/68    137/78    140/75    141/77     BP Location:Right arm Right arm Right arm Right arm    Patient Position:  Lying     Lying              Sitting     Pulse:      73        71        70        77       Resp:       16        16        16        18       Temp:                                              TempSrc:             Oral                          SpO2:      99%       99%       98%       98%       Weight:                                            Height:                                           -----------------------------------------------------  Medications - No data to display  ECG/EMG Results (last 24 hours)     Procedure Component Value Units Date/Time    ECG 12 Lead ([id number]) Collected: 04/21/21 1309     Updated: 04/21/21 1459     QT Interval 394 ms      QTC Interval 425 ms     Narrative:      Test Reason : chest pain  Blood Pressure : **/** mmHG  Vent. Rate : 070 BPM     Atrial Rate : 070 BPM     P-R Int : 192 ms          QRS Dur : 080 ms      QT Int : 394 ms       P-R-T Axes : 028 -09 009 degrees     QTc Int : 425 ms    Normal sinus rhythm  Minimal voltage criteria for LVH, may be normal variant  Possible Anterior infarct , age undetermined  Abnormal ECG  No previous ECGs available  Confirmed by ERUM BLOUNT MD ([id number]) on 4/21/2021 2:59:10 PM    Referred By:  EDMD           Confirmed By:ERUM BLOUNT MD    ECG 12 Lead ([id number]) Collected: 04/21/21 1519     Updated: 04/21/21 1535     QT Interval 408 ms      QTC Interval 433 ms     Narrative:      Test Reason : chest pain  Blood Pressure : **/** mmHG  Vent. Rate : 068 BPM     Atrial Rate : 068 BPM     P-R Int : 200 ms          QRS Dur : 078 ms      QT Int : 408 ms       P-R-T Axes : 033 -11 002 degrees     QTc Int : 433 ms    Normal sinus rhythm  Minimal voltage criteria for LVH, may  be normal variant    Abnormal ECG  When compared with ECG of 21-APR-2021 13:09,  No significant change was found  Confirmed by ERUM BLOUNT MD ([id number]) on 4/21/2021 3:34:59 PM    Referred By:  CHEO           Confirmed By:ERUM BLOUNT MD      ECG 12 Lead   Final Result    Test Reason : chest pain    Blood Pressure : **/** mmHG    Vent. Rate : 068 BPM     Atrial Rate : 068 BPM       P-R Int : 200 ms          QRS Dur : 078 ms        QT Int : 408 ms       P-R-T Axes : 033 -11 002 degrees       QTc Int : 433 ms        Normal sinus rhythm    Minimal voltage criteria for LVH, may be normal variant        Abnormal ECG    When compared with ECG of 21-APR-2021 13:09,    No significant change was found    Confirmed by ERUM BLOUNT MD ([id number]) on 4/21/2021 3:34:59 PM        Referred By:  CHEO           Confirmed By:ERUM BLOUNT MD     ECG 12 Lead   Final Result    Test Reason : chest pain    Blood Pressure : **/** mmHG    Vent. Rate : 070 BPM     Atrial Rate : 070 BPM       P-R Int : 192 ms          QRS Dur : 080 ms        QT Int : 394 ms       P-R-T Axes : 028 -09 009 degrees       QTc Int : 425 ms        Normal sinus rhythm    Minimal voltage criteria for LVH, may be normal variant    Possible Anterior infarct , age undetermined    Abnormal ECG    No previous ECGs available    Confirmed by ERUM BLOUNT MD ([id number]) on 4/21/2021 2:59:10 PM        Referred By:  CHEO           Confirmed By:ERUM BLOUNT MD            Amount and/or Complexity of Data Reviewed  Clinical lab tests: reviewed  Tests in the radiology section of CPT®: reviewed  Discuss the patient with other providers: yes  Independent visualization of images, tracings, or specimens: yes        Final diagnoses:   Chest pain, unspecified type   Elevated blood pressure reading with diagnosis of hypertension   Family history of coronary artery disease       ED Disposition  ED Disposition     ED Disposition Condition Comment    Discharge  Review needed by CMO to determine  Physician of Record: Letty Monae MD  [address] [facility]  [address]  [place] [id number]  [phone]      1-2 weeks    [facility]  [address] [facility] [address] [phone]    As needed, If symptoms worsen or ANY concerns.         Medication List      New Prescriptions    aspirin 81 MG EC tablet  Take 1 tablet by mouth Daily.     carvedilol 3.125 MG tablet  Commonly known as: COREG  Take 1 tablet by mouth 2 (Two) Times a Day.     nitroglycerin 0.4 MG SL tablet  Commonly known as: NITROSTAT  1 under the tongue as needed for angina, may repeat q5mins for up three doses           Where to Get Your Medications      These medications were sent to [facility] - [place], [place] - [address] [place] [address] Rd. - [phone]  - [phone] FX  [address] [place] [address]. [place]. [address] [address]    Phone: [phone]   · carvedilol 3.125 MG tablet  · nitroglycerin 0.4 MG SL tablet     Information about where to get these medications is not yet available    Ask your nurse or doctor about these medications  · aspirin 81 MG EC tablet          Sg Mcclendon MD  04/21/21 2008

## 2021-04-21 NOTE — CONSULTS
"[facility] Cardiology         Date of Hospital Visit: 21      Place of Service: [facility]    Patient Name: Sapna Ng  :1965    Referral Provider: ED Physician  Primary Care Provider: Letty Diaz MD    Chief complaint/Reason for Consultation:  Chest Pain         Problem List:  Active Hospital Problems    Diagnosis    • Precordial pain    • Mixed hyperlipidemia    • Essential hypertension    • Abnormal chest x-ray    • Obesity (BMI 30-39.9)    • Moderate obstructive sleep apnea          History of Present Illness:  This is a 55-year-old hypertensive dyslipidemic female with a family history of precocious coronary artery disease but no personal history of cardiac disease.  She presents to the [facility] emergency department today with a complaint of 2 episodes of midsternal chest pain which she describes as \"tightness\".  Both of these episodes lasted for approximately 5 minutes and resolved with rest.  She denied associated shortness of breath, possible nausea with the second episode.  She had diaphoresis but denies radiating pain.  When the pain recurred today for the second time she consulted with the school nurse who advised her to seek medical evaluation.  At present she is completely comfortable and in no apparent distress.  Her initial EKG shows nonspecific T wave changes and troponin I is within normal range.  She states her blood pressure at home typically runs between 130 to 140 mmHg but occasionally reaches about 150.  She did not feel that she needed antihypertensive medications.  Her lipids have not been checked for many years.  We have a lipid panel from 2016 which was significantly elevated.  She is not diabetic and is a lifelong non-smoker.  Both her father and her brother  from MI prior to age 50.  She denies orthopnea, PND, claudication, lower extreme edema.  She has no awareness of tachyarrhythmias, denies dizziness or syncope.    Denies chest pain " currently.  No chest pain over the last several months, she is quite active.  Only chest pain last night and today.  Currently pain-free.    Past Surgical History:   Procedure Laterality Date   • LAPAROSCOPIC TUBAL LIGATION         Allergies   Allergen Reactions   • Penicillins Itching       Current Outpatient Medications   Medication Instructions   • Cetirizine HCl (ZYRTEC ALLERGY) 10 MG capsule Zyrtec 10 mg tablet   Take 1 tablet every day by oral route.   • ferrous sulfate (IRON SUPPLEMENT) 325 (65 FE) MG tablet ferrous sulfate 325 mg (65 mg iron) tablet   Take 1 tablet every day by oral route.   • montelukast (SINGULAIR) 10 mg, Oral, Nightly          Scheduled Meds:aspirin, 324 mg, Oral, Once      Continuous Infusions:         Social History     Socioeconomic History   • Marital status:      Spouse name: Not on file   • Number of children: Not on file   • Years of education: Not on file   • Highest education level: Not on file   Tobacco Use   • Smoking status: Never Smoker   • Smokeless tobacco: Never Used   Substance and Sexual Activity   • Alcohol use: Yes     Comment: OCCASSIONALLY   • Drug use: No   • Sexual activity: Defer       Family History   Problem Relation Age of Onset   • Breast cancer Mother 70   • Breast cancer Maternal Aunt 80   • Breast cancer Maternal Aunt         approx age 30's   • Ovarian cancer Neg Hx        REVIEW OF SYSTEMS:   Review of Systems   Constitutional: Negative.   HENT: Negative.    Eyes: Negative.    Cardiovascular: Positive for chest pain.   Respiratory: Negative.    Endocrine: Negative.    Hematologic/Lymphatic: Negative.    Skin: Negative.    Musculoskeletal: Negative.    Gastrointestinal: Negative.    Genitourinary: Negative.    Neurological: Negative.    Psychiatric/Behavioral: Negative.    Allergic/Immunologic: Negative.    All other systems reviewed and are negative.           Objective:  Vitals:    04/21/21 1303 04/21/21 1430 04/21/21 1500 04/21/21 1530   BP:  "152/96 125/68 137/78 140/75   BP Location: Left arm Right arm Right arm Right arm   Patient Position: Sitting Lying Lying    Pulse: 76 73 71 70   Resp: 18 16 16 16   Temp: 99 °F (37.2 °C)      TempSrc: Oral  Oral    SpO2: 100% 99% 99% 98%   Weight: 74.8 kg (165 lb)      Height: 152.4 cm (60\")        Body mass index is 32.22 kg/m².  Flowsheet Rows      First Filed Value   Admission Height  152.4 cm (60\") Documented at 04/21/2021 1303   Admission Weight  74.8 kg (165 lb) Documented at 04/21/2021 1303        No intake or output data in the 24 hours ending 04/21/21 1600  Physical examination:    General Appearance:   · well developed  · well nourished  HENT:   · oropharynx moist  · lips not cyanotic  Neck:  · thyroid not enlarged  · supple  Respiratory:  · no respiratory distress  · normal breath sounds  · no rales  Cardiovascular:  · no jugular venous distention  · regular rhythm  · apical impulse normal  · S1 normal, S2 normal  · no S3, no S4   · no murmur  · no rub, no thrill  · carotid pulses normal; no bruit  · pedal pulses normal  · lower extremity edema: none    Gastrointestinal:   · bowel sounds normal  · non-tender  · no hepatomegaly, no splenomegaly  Musculoskeletal:  · no clubbing of fingers.   · normocephalic, head atraumatic  Skin:   · warm  · dry  Psychiatric:  · judgement and insight appropriate  · normal mood and affect      Lab Review:                CBC:      Lab 04/21/21  1311   WBC 7.11   HEMOGLOBIN 11.4*   HEMATOCRIT 32.8*   PLATELETS 271   NEUTROS ABS 4.83   IMMATURE GRANS (ABS) 0.03   LYMPHS ABS 1.41   MONOS ABS 0.61   EOS ABS 0.20   MCV 91.4     CMP:        Lab 04/21/21  1311   SODIUM 138   POTASSIUM 3.8   CHLORIDE 104   CO2 28.0   ANION GAP 6.0   BUN 12   CREATININE 0.81   GLUCOSE 98   CALCIUM 8.8   TOTAL PROTEIN 6.8   ALBUMIN 4.20   GLOBULIN 2.6   ALT (SGPT) 45*   AST (SGOT) 29   BILIRUBIN 0.3   ALK PHOS 61   LIPASE 32         No results found for: HGBA1C  Estimated Creatinine Clearance: 70 " mL/min (by C-G formula based on SCr of 0.81 mg/dL).  CARDIAC LABS:      Lab 04/21/21  1311   PROBNP 26.9   TROPONIN T <0.010       Lab Results   Component Value Date    CHLPL 207 (H) 06/22/2016    TRIG 363 (H) 06/22/2016    HDL 34 (L) 06/22/2016     (H) 06/22/2016         EKG:                 IMPRESSION:  1. Mild cardiomegaly and pulmonary venous hypertension.  2. Mild diffuse bronchial thickening which may be either acute or  chronic.  3. 12 mm nodule or nodular scar in the right midlung or possibly  sclerotic rib lesion. If this is not known from outside studies,  consider follow-up imaging, whether initially with standard two-view  chest radiograph, or with chest CT scan.          Result Review:  I have personally reviewed the results from the time of admission and agree with these findings:  [x]  Laboratory  [x]  Radiology  [x]  EKG/Telemetry   []  Pathology  [x]  Old records  []  Other:    Assessment and Plan:     1. Precordia chest pain   -Outpatient myocardial perfusion study   -Carvedilol 3.125 mg twice daily   -Follow-up with Dr. Hill in 3 months   -Sublingual nitroglycerin 0.4 mg as needed chest pain    2. Hypertension   -Carvedilol 3.125 mg twice daily    3. Hyperlipidemia   -Follow-up with primary care    4.  FH of precocious coronary artery disease   -Risk factor management per primary care    5.  Abnormal chest x-ray   -Follow-up with primary care         Electronically signed by LOREE Rivera, 04/21/21, 4:08 PM EDT.    No evidence of ACS, EKG and troponins negative.  Patient is pain-free.  Advised patient start aspirin 81 mg daily and carvedilol 3.125 mg twice daily.  Sublingual nitroglycerin as needed.  We will arrange outpatient stress test and echocardiogram for further evaluation.  Patient advised to call [phone] if any symptoms worsen or return.  Follow-up with me in 2-3 months.    I have seen and examined the patient, performing a face-to-face diagnostic evaluation with plan of  care reviewed and developed with the Advanced Practice Clinician and nursing staff. I have addended and modified the above history of present illness, physical examination, and assessment and plan to reflect my findings and impressions    Maurizio Hill MD, FACC  04/21/21

## 2021-04-22 ENCOUNTER — APPOINTMENT (OUTPATIENT)
Dept: GENERAL RADIOLOGY | Facility: HOSPITAL | Age: 56
End: 2021-04-22

## 2021-04-22 ENCOUNTER — HOSPITAL ENCOUNTER (INPATIENT)
Facility: HOSPITAL | Age: 56
LOS: 2 days | Discharge: HOME | End: 2021-04-24
Attending: EMERGENCY MEDICINE | Admitting: INTERNAL MEDICINE

## 2021-04-22 ENCOUNTER — APPOINTMENT (OUTPATIENT)
Dept: CARDIOLOGY | Facility: HOSPITAL | Age: 56
End: 2021-04-22

## 2021-04-22 DIAGNOSIS — I21.3 ST ELEVATION MYOCARDIAL INFARCTION (STEMI), UNSPECIFIED ARTERY (HCC): Primary | ICD-10-CM

## 2021-04-22 PROBLEM — I21.02 ST ELEVATION MYOCARDIAL INFARCTION INVOLVING LEFT ANTERIOR DESCENDING (LAD) CORONARY ARTERY: Status: ACTIVE | Noted: 2021-04-22

## 2021-04-22 LAB
ACT BLD: 221 SECONDS (ref 82–152)
ALBUMIN SERPL-MCNC: 4.1 G/DL (ref 3.5–5.2)
ALBUMIN/GLOB SERPL: 1.5 G/DL
ALP SERPL-CCNC: 63 U/L (ref 39–117)
ALT SERPL W P-5'-P-CCNC: 47 U/L (ref 1–33)
ANION GAP SERPL CALCULATED.3IONS-SCNC: 10.1 MMOL/L (ref 5–15)
AST SERPL-CCNC: 30 U/L (ref 1–32)
BASOPHILS # BLD AUTO: 0.06 10*3/MM3 (ref 0–0.2)
BASOPHILS NFR BLD AUTO: 0.8 % (ref 0–1.5)
BH CV ECHO MEAS - % IVS THICK: 30.8 %
BH CV ECHO MEAS - % LVPW THICK: 50.9 %
BH CV ECHO MEAS - AO MAX PG (FULL): 10.1 MMHG
BH CV ECHO MEAS - AO MAX PG: 13 MMHG
BH CV ECHO MEAS - AO MEAN PG (FULL): 4 MMHG
BH CV ECHO MEAS - AO MEAN PG: 6 MMHG
BH CV ECHO MEAS - AO ROOT AREA (BSA CORRECTED): 1.1
BH CV ECHO MEAS - AO ROOT AREA: 2.9 CM^2
BH CV ECHO MEAS - AO ROOT DIAM: 1.9 CM
BH CV ECHO MEAS - AO V2 MAX: 183 CM/SEC
BH CV ECHO MEAS - AO V2 MEAN: 111 CM/SEC
BH CV ECHO MEAS - AO V2 VTI: 35.3 CM
BH CV ECHO MEAS - ASC AORTA: 2.5 CM
BH CV ECHO MEAS - AVA(I,A): 0.78 CM^2
BH CV ECHO MEAS - AVA(I,D): 0.78 CM^2
BH CV ECHO MEAS - AVA(V,A): 0.61 CM^2
BH CV ECHO MEAS - AVA(V,D): 0.61 CM^2
BH CV ECHO MEAS - BSA(HAYCOCK): 1.8 M^2
BH CV ECHO MEAS - BSA: 1.7 M^2
BH CV ECHO MEAS - BZI_BMI: 32.8 KILOGRAMS/M^2
BH CV ECHO MEAS - BZI_METRIC_HEIGHT: 152.4 CM
BH CV ECHO MEAS - BZI_METRIC_WEIGHT: 76.2 KG
BH CV ECHO MEAS - EDV(CUBED): 55.3 ML
BH CV ECHO MEAS - EDV(MOD-SP2): 85.2 ML
BH CV ECHO MEAS - EDV(MOD-SP4): 84.4 ML
BH CV ECHO MEAS - EDV(TEICH): 62.3 ML
BH CV ECHO MEAS - EF(CUBED): 88.4 %
BH CV ECHO MEAS - EF(MOD-BP): 59 %
BH CV ECHO MEAS - EF(MOD-SP2): 64 %
BH CV ECHO MEAS - EF(MOD-SP4): 51.7 %
BH CV ECHO MEAS - EF(TEICH): 83 %
BH CV ECHO MEAS - ESV(CUBED): 6.4 ML
BH CV ECHO MEAS - ESV(MOD-SP2): 30.7 ML
BH CV ECHO MEAS - ESV(MOD-SP4): 40.8 ML
BH CV ECHO MEAS - ESV(TEICH): 10.6 ML
BH CV ECHO MEAS - FS: 51.2 %
BH CV ECHO MEAS - IVS/LVPW: 1.4
BH CV ECHO MEAS - IVSD: 1.1 CM
BH CV ECHO MEAS - IVSS: 2 CM
BH CV ECHO MEAS - LA DIMENSION: 3.2 CM
BH CV ECHO MEAS - LA/AO: 1.7
BH CV ECHO MEAS - LAD MAJOR: 5.4 CM
BH CV ECHO MEAS - LAT PEAK E' VEL: 6.1 CM/SEC
BH CV ECHO MEAS - LATERAL E/E' RATIO: 18.6
BH CV ECHO MEAS - LV DIASTOLIC VOL/BSA (35-75): 48.7 ML/M^2
BH CV ECHO MEAS - LV MASS(C)D: 182 GRAMS
BH CV ECHO MEAS - LV MASS(C)DI: 105 GRAMS/M^2
BH CV ECHO MEAS - LV MASS(C)S: 140.6 GRAMS
BH CV ECHO MEAS - LV MASS(C)SI: 81.1 GRAMS/M^2
BH CV ECHO MEAS - LV MAX PG: 2.9 MMHG
BH CV ECHO MEAS - LV MEAN PG: 2 MMHG
BH CV ECHO MEAS - LV SYSTOLIC VOL/BSA (12-30): 23.5 ML/M^2
BH CV ECHO MEAS - LV V1 MAX: 85 CM/SEC
BH CV ECHO MEAS - LV V1 MEAN: 59 CM/SEC
BH CV ECHO MEAS - LV V1 VTI: 21.2 CM
BH CV ECHO MEAS - LVIDD: 3.8 CM
BH CV ECHO MEAS - LVIDS: 1.9 CM
BH CV ECHO MEAS - LVLD AP2: 8.1 CM
BH CV ECHO MEAS - LVLD AP4: 8.3 CM
BH CV ECHO MEAS - LVLS AP2: 7.5 CM
BH CV ECHO MEAS - LVLS AP4: 7.6 CM
BH CV ECHO MEAS - LVOT AREA (M): 1.3 CM^2
BH CV ECHO MEAS - LVOT AREA: 1.3 CM^2
BH CV ECHO MEAS - LVOT DIAM: 1.3 CM
BH CV ECHO MEAS - LVPWD: 1.1 CM
BH CV ECHO MEAS - LVPWS: 1.7 CM
BH CV ECHO MEAS - MED PEAK E' VEL: 7.3 CM/SEC
BH CV ECHO MEAS - MEDIAL E/E' RATIO: 15.5
BH CV ECHO MEAS - MV A MAX VEL: 148 CM/SEC
BH CV ECHO MEAS - MV DEC TIME: 0.17 SEC
BH CV ECHO MEAS - MV E MAX VEL: 113 CM/SEC
BH CV ECHO MEAS - MV E/A: 0.76
BH CV ECHO MEAS - MV MAX PG: 8.3 MMHG
BH CV ECHO MEAS - MV MEAN PG: 4 MMHG
BH CV ECHO MEAS - MV V2 MAX: 144 CM/SEC
BH CV ECHO MEAS - MV V2 MEAN: 96.3 CM/SEC
BH CV ECHO MEAS - MV V2 VTI: 43 CM
BH CV ECHO MEAS - MVA(VTI): 0.64 CM^2
BH CV ECHO MEAS - PA ACC TIME: 0.13 SEC
BH CV ECHO MEAS - PA MAX PG (FULL): 1.7 MMHG
BH CV ECHO MEAS - PA MAX PG: 3.5 MMHG
BH CV ECHO MEAS - PA MEAN PG (FULL): 1 MMHG
BH CV ECHO MEAS - PA MEAN PG: 2 MMHG
BH CV ECHO MEAS - PA PR(ACCEL): 20.5 MMHG
BH CV ECHO MEAS - PA V2 MAX: 93.2 CM/SEC
BH CV ECHO MEAS - PA V2 MEAN: 60.2 CM/SEC
BH CV ECHO MEAS - PA V2 VTI: 19.8 CM
BH CV ECHO MEAS - PULM A REVS DUR: 0.13 SEC
BH CV ECHO MEAS - PULM A REVS VEL: 32.1 CM/SEC
BH CV ECHO MEAS - PULM DIAS VEL: 42.5 CM/SEC
BH CV ECHO MEAS - PULM S/D: 1.4
BH CV ECHO MEAS - PULM SYS VEL: 60.3 CM/SEC
BH CV ECHO MEAS - RAP SYSTOLE: 3 MMHG
BH CV ECHO MEAS - RV MAX PG: 1.7 MMHG
BH CV ECHO MEAS - RV MEAN PG: 1 MMHG
BH CV ECHO MEAS - RV V1 MAX: 65.8 CM/SEC
BH CV ECHO MEAS - RV V1 MEAN: 44.5 CM/SEC
BH CV ECHO MEAS - RV V1 VTI: 15 CM
BH CV ECHO MEAS - SI(AO): 59 ML/M^2
BH CV ECHO MEAS - SI(CUBED): 28.2 ML/M^2
BH CV ECHO MEAS - SI(LVOT): 16 ML/M^2
BH CV ECHO MEAS - SI(MOD-SP2): 31.4 ML/M^2
BH CV ECHO MEAS - SI(MOD-SP4): 25.2 ML/M^2
BH CV ECHO MEAS - SI(TEICH): 29.9 ML/M^2
BH CV ECHO MEAS - SV(AO): 102.2 ML
BH CV ECHO MEAS - SV(CUBED): 48.9 ML
BH CV ECHO MEAS - SV(LVOT): 27.7 ML
BH CV ECHO MEAS - SV(MOD-SP2): 54.5 ML
BH CV ECHO MEAS - SV(MOD-SP4): 43.6 ML
BH CV ECHO MEAS - SV(TEICH): 51.8 ML
BH CV ECHO MEAS - TAPSE (>1.6): 2.3 CM
BH CV ECHO MEASUREMENTS AVERAGE E/E' RATIO: 16.87
BH CV XLRA - RV BASE: 2.7 CM
BH CV XLRA - RV LENGTH: 7.6 CM
BH CV XLRA - RV MID: 1.7 CM
BH CV XLRA - TDI S': 8.8 CM/SEC
BILIRUB SERPL-MCNC: 0.3 MG/DL (ref 0–1.2)
BUN SERPL-MCNC: 11 MG/DL (ref 6–20)
BUN/CREAT SERPL: 12.6 (ref 7–25)
CALCIUM SPEC-SCNC: 9.1 MG/DL (ref 8.6–10.5)
CHLORIDE SERPL-SCNC: 104 MMOL/L (ref 98–107)
CO2 SERPL-SCNC: 25.9 MMOL/L (ref 22–29)
CREAT SERPL-MCNC: 0.87 MG/DL (ref 0.57–1)
DEPRECATED RDW RBC AUTO: 39.3 FL (ref 37–54)
EOSINOPHIL # BLD AUTO: 0.17 10*3/MM3 (ref 0–0.4)
EOSINOPHIL NFR BLD AUTO: 2.1 % (ref 0.3–6.2)
ERYTHROCYTE [DISTWIDTH] IN BLOOD BY AUTOMATED COUNT: 11.9 % (ref 12.3–15.4)
GFR SERPL CREATININE-BSD FRML MDRD: 67 ML/MIN/1.73
GLOBULIN UR ELPH-MCNC: 2.8 GM/DL
GLUCOSE BLDC GLUCOMTR-MCNC: 131 MG/DL (ref 70–130)
GLUCOSE SERPL-MCNC: 114 MG/DL (ref 65–99)
HCT VFR BLD AUTO: 33.6 % (ref 34–46.6)
HGB BLD-MCNC: 11.6 G/DL (ref 12–15.9)
HOLD SPECIMEN: NORMAL
IMM GRANULOCYTES # BLD AUTO: 0.04 10*3/MM3 (ref 0–0.05)
IMM GRANULOCYTES NFR BLD AUTO: 0.5 % (ref 0–0.5)
LEFT ATRIUM VOLUME INDEX: 25.2 ML/M^2
LEFT ATRIUM VOLUME: 43.7 ML
LV EF 2D ECHO EST: 59 %
LYMPHOCYTES # BLD AUTO: 1.86 10*3/MM3 (ref 0.7–3.1)
LYMPHOCYTES NFR BLD AUTO: 23.3 % (ref 19.6–45.3)
MCH RBC QN AUTO: 31.3 PG (ref 26.6–33)
MCHC RBC AUTO-ENTMCNC: 34.5 G/DL (ref 31.5–35.7)
MCV RBC AUTO: 90.6 FL (ref 79–97)
MONOCYTES # BLD AUTO: 0.75 10*3/MM3 (ref 0.1–0.9)
MONOCYTES NFR BLD AUTO: 9.4 % (ref 5–12)
NEUTROPHILS NFR BLD AUTO: 5.12 10*3/MM3 (ref 1.7–7)
NEUTROPHILS NFR BLD AUTO: 63.9 % (ref 42.7–76)
NRBC BLD AUTO-RTO: 0 /100 WBC (ref 0–0.2)
PLATELET # BLD AUTO: 309 10*3/MM3 (ref 140–450)
PMV BLD AUTO: 11 FL (ref 6–12)
POTASSIUM SERPL-SCNC: 3.5 MMOL/L (ref 3.5–5.2)
PROT SERPL-MCNC: 6.9 G/DL (ref 6–8.5)
RBC # BLD AUTO: 3.71 10*6/MM3 (ref 3.77–5.28)
SARS-COV-2 RNA PNL SPEC NAA+PROBE: NOT DETECTED
SODIUM SERPL-SCNC: 140 MMOL/L (ref 136–145)
TROPONIN T SERPL-MCNC: <0.01 NG/ML (ref 0–0.03)
WBC # BLD AUTO: 8 10*3/MM3 (ref 3.4–10.8)
WHOLE BLOOD HOLD SPECIMEN: NORMAL
WHOLE BLOOD HOLD SPECIMEN: NORMAL

## 2021-04-22 PROCEDURE — 027034Z DILATION OF CORONARY ARTERY, ONE ARTERY WITH DRUG-ELUTING INTRALUMINAL DEVICE, PERCUTANEOUS APPROACH: ICD-10-PCS | Performed by: INTERNAL MEDICINE

## 2021-04-22 PROCEDURE — C1769 GUIDE WIRE: HCPCS | Performed by: INTERNAL MEDICINE

## 2021-04-22 PROCEDURE — 82962 GLUCOSE BLOOD TEST: CPT

## 2021-04-22 PROCEDURE — C1753 CATH, INTRAVAS ULTRASOUND: HCPCS | Performed by: INTERNAL MEDICINE

## 2021-04-22 PROCEDURE — 99153 MOD SED SAME PHYS/QHP EA: CPT | Performed by: INTERNAL MEDICINE

## 2021-04-22 PROCEDURE — 87635 SARS-COV-2 COVID-19 AMP PRB: CPT | Performed by: EMERGENCY MEDICINE

## 2021-04-22 PROCEDURE — 92941 PRQ TRLML REVSC TOT OCCL AMI: CPT | Performed by: INTERNAL MEDICINE

## 2021-04-22 PROCEDURE — 99152 MOD SED SAME PHYS/QHP 5/>YRS: CPT | Performed by: INTERNAL MEDICINE

## 2021-04-22 PROCEDURE — 25010000003 LIDOCAINE 1 % SOLUTION: Performed by: INTERNAL MEDICINE

## 2021-04-22 PROCEDURE — C1887 CATHETER, GUIDING: HCPCS | Performed by: INTERNAL MEDICINE

## 2021-04-22 PROCEDURE — C1894 INTRO/SHEATH, NON-LASER: HCPCS | Performed by: INTERNAL MEDICINE

## 2021-04-22 PROCEDURE — 93005 ELECTROCARDIOGRAM TRACING: CPT | Performed by: EMERGENCY MEDICINE

## 2021-04-22 PROCEDURE — 4A023N7 MEASUREMENT OF CARDIAC SAMPLING AND PRESSURE, LEFT HEART, PERCUTANEOUS APPROACH: ICD-10-PCS | Performed by: INTERNAL MEDICINE

## 2021-04-22 PROCEDURE — 85025 COMPLETE CBC W/AUTO DIFF WBC: CPT | Performed by: EMERGENCY MEDICINE

## 2021-04-22 PROCEDURE — 93454 CORONARY ARTERY ANGIO S&I: CPT | Performed by: INTERNAL MEDICINE

## 2021-04-22 PROCEDURE — 25010000002 FENTANYL CITRATE (PF) 100 MCG/2ML SOLUTION: Performed by: INTERNAL MEDICINE

## 2021-04-22 PROCEDURE — 93306 TTE W/DOPPLER COMPLETE: CPT | Performed by: INTERNAL MEDICINE

## 2021-04-22 PROCEDURE — C1725 CATH, TRANSLUMIN NON-LASER: HCPCS | Performed by: INTERNAL MEDICINE

## 2021-04-22 PROCEDURE — C1874 STENT, COATED/COV W/DEL SYS: HCPCS | Performed by: INTERNAL MEDICINE

## 2021-04-22 PROCEDURE — 99223 1ST HOSP IP/OBS HIGH 75: CPT | Performed by: INTERNAL MEDICINE

## 2021-04-22 PROCEDURE — 85347 COAGULATION TIME ACTIVATED: CPT

## 2021-04-22 PROCEDURE — 25010000002 EPTIFIBATIDE PER 5 MG: Performed by: INTERNAL MEDICINE

## 2021-04-22 PROCEDURE — 25010000002 MIDAZOLAM PER 1MG: Performed by: INTERNAL MEDICINE

## 2021-04-22 PROCEDURE — B2111ZZ FLUOROSCOPY OF MULTIPLE CORONARY ARTERIES USING LOW OSMOLAR CONTRAST: ICD-10-PCS | Performed by: INTERNAL MEDICINE

## 2021-04-22 PROCEDURE — 0 IOPAMIDOL PER 1 ML: Performed by: INTERNAL MEDICINE

## 2021-04-22 PROCEDURE — 84484 ASSAY OF TROPONIN QUANT: CPT | Performed by: EMERGENCY MEDICINE

## 2021-04-22 PROCEDURE — 92978 ENDOLUMINL IVUS OCT C 1ST: CPT | Performed by: INTERNAL MEDICINE

## 2021-04-22 PROCEDURE — 71045 X-RAY EXAM CHEST 1 VIEW: CPT

## 2021-04-22 PROCEDURE — C9606 PERC D-E COR REVASC W AMI S: HCPCS | Performed by: INTERNAL MEDICINE

## 2021-04-22 PROCEDURE — B221Z2Z COMPUTERIZED TOMOGRAPHY (CT SCAN) OF MULTIPLE CORONARY ARTERIES USING INTRAVASCULAR OPTICAL COHERENCE: ICD-10-PCS | Performed by: INTERNAL MEDICINE

## 2021-04-22 PROCEDURE — 3E033PZ INTRODUCTION OF PLATELET INHIBITOR INTO PERIPHERAL VEIN, PERCUTANEOUS APPROACH: ICD-10-PCS | Performed by: INTERNAL MEDICINE

## 2021-04-22 PROCEDURE — 99284 EMERGENCY DEPT VISIT MOD MDM: CPT

## 2021-04-22 PROCEDURE — 25010000002 HEPARIN (PORCINE) PER 1000 UNITS: Performed by: INTERNAL MEDICINE

## 2021-04-22 PROCEDURE — 93306 TTE W/DOPPLER COMPLETE: CPT

## 2021-04-22 PROCEDURE — 80053 COMPREHEN METABOLIC PANEL: CPT | Performed by: EMERGENCY MEDICINE

## 2021-04-22 DEVICE — XIENCE SIERRA™ EVEROLIMUS ELUTING CORONARY STENT SYSTEM 3.00 MM X 28 MM / RAPID-EXCHANGE
Type: IMPLANTABLE DEVICE | Status: FUNCTIONAL
Brand: XIENCE SIERRA™

## 2021-04-22 RX ORDER — CARVEDILOL 3.12 MG/1
3.12 TABLET ORAL 2 TIMES DAILY
Status: DISCONTINUED | OUTPATIENT
Start: 2021-04-22 | End: 2021-04-24 | Stop reason: HOSPADM

## 2021-04-22 RX ORDER — ASPIRIN 81 MG/1
81 TABLET ORAL DAILY
Status: DISCONTINUED | OUTPATIENT
Start: 2021-04-23 | End: 2021-04-24 | Stop reason: HOSPADM

## 2021-04-22 RX ORDER — HYDROCODONE BITARTRATE AND ACETAMINOPHEN 5; 325 MG/1; MG/1
1 TABLET ORAL EVERY 4 HOURS PRN
Status: DISCONTINUED | OUTPATIENT
Start: 2021-04-22 | End: 2021-04-24 | Stop reason: HOSPADM

## 2021-04-22 RX ORDER — NALOXONE HCL 0.4 MG/ML
0.4 VIAL (ML) INJECTION
Status: DISCONTINUED | OUTPATIENT
Start: 2021-04-22 | End: 2021-04-24 | Stop reason: HOSPADM

## 2021-04-22 RX ORDER — CETIRIZINE HYDROCHLORIDE 10 MG/1
10 TABLET ORAL NIGHTLY
Status: DISCONTINUED | OUTPATIENT
Start: 2021-04-22 | End: 2021-04-24 | Stop reason: HOSPADM

## 2021-04-22 RX ORDER — EPTIFIBATIDE 0.75 MG/ML
2 INJECTION, SOLUTION INTRAVENOUS CONTINUOUS
Status: DISCONTINUED | OUTPATIENT
Start: 2021-04-22 | End: 2021-04-23

## 2021-04-22 RX ORDER — SODIUM CHLORIDE 9 MG/ML
100 INJECTION, SOLUTION INTRAVENOUS CONTINUOUS
Status: ACTIVE | OUTPATIENT
Start: 2021-04-22 | End: 2021-04-22

## 2021-04-22 RX ORDER — FENTANYL CITRATE 50 UG/ML
INJECTION, SOLUTION INTRAMUSCULAR; INTRAVENOUS AS NEEDED
Status: DISCONTINUED | OUTPATIENT
Start: 2021-04-22 | End: 2021-04-22 | Stop reason: HOSPADM

## 2021-04-22 RX ORDER — ASPIRIN 81 MG/1
324 TABLET, CHEWABLE ORAL ONCE
Status: COMPLETED | OUTPATIENT
Start: 2021-04-22 | End: 2021-04-22

## 2021-04-22 RX ORDER — DIPHENHYDRAMINE HYDROCHLORIDE 50 MG/ML
25 INJECTION INTRAMUSCULAR; INTRAVENOUS EVERY 6 HOURS PRN
Status: DISCONTINUED | OUTPATIENT
Start: 2021-04-22 | End: 2021-04-24 | Stop reason: HOSPADM

## 2021-04-22 RX ORDER — ATORVASTATIN CALCIUM 80 MG/1
80 TABLET, FILM COATED ORAL NIGHTLY
Status: DISCONTINUED | OUTPATIENT
Start: 2021-04-22 | End: 2021-04-24 | Stop reason: HOSPADM

## 2021-04-22 RX ORDER — HEPARIN SODIUM 1000 [USP'U]/ML
INJECTION, SOLUTION INTRAVENOUS; SUBCUTANEOUS AS NEEDED
Status: DISCONTINUED | OUTPATIENT
Start: 2021-04-22 | End: 2021-04-22 | Stop reason: HOSPADM

## 2021-04-22 RX ORDER — TEMAZEPAM 15 MG/1
15 CAPSULE ORAL NIGHTLY PRN
Status: DISCONTINUED | OUTPATIENT
Start: 2021-04-22 | End: 2021-04-24 | Stop reason: HOSPADM

## 2021-04-22 RX ORDER — MONTELUKAST SODIUM 10 MG/1
10 TABLET ORAL NIGHTLY
COMMUNITY

## 2021-04-22 RX ORDER — MONTELUKAST SODIUM 10 MG/1
10 TABLET ORAL NIGHTLY
Status: DISCONTINUED | OUTPATIENT
Start: 2021-04-22 | End: 2021-04-24 | Stop reason: HOSPADM

## 2021-04-22 RX ORDER — MORPHINE SULFATE 4 MG/ML
4 INJECTION, SOLUTION INTRAMUSCULAR; INTRAVENOUS
Status: DISCONTINUED | OUTPATIENT
Start: 2021-04-22 | End: 2021-04-24 | Stop reason: HOSPADM

## 2021-04-22 RX ORDER — ACETAMINOPHEN 325 MG/1
650 TABLET ORAL EVERY 4 HOURS PRN
Status: DISCONTINUED | OUTPATIENT
Start: 2021-04-22 | End: 2021-04-24 | Stop reason: HOSPADM

## 2021-04-22 RX ORDER — ALPRAZOLAM 0.25 MG/1
0.25 TABLET ORAL 3 TIMES DAILY PRN
Status: DISCONTINUED | OUTPATIENT
Start: 2021-04-22 | End: 2021-04-24 | Stop reason: HOSPADM

## 2021-04-22 RX ORDER — ONDANSETRON 2 MG/ML
4 INJECTION INTRAMUSCULAR; INTRAVENOUS EVERY 6 HOURS PRN
Status: DISCONTINUED | OUTPATIENT
Start: 2021-04-22 | End: 2021-04-24 | Stop reason: HOSPADM

## 2021-04-22 RX ORDER — EPTIFIBATIDE 0.75 MG/ML
INJECTION, SOLUTION INTRAVENOUS CONTINUOUS PRN
Status: COMPLETED | OUTPATIENT
Start: 2021-04-22 | End: 2021-04-22

## 2021-04-22 RX ORDER — MIDAZOLAM HYDROCHLORIDE 2 MG/2ML
INJECTION, SOLUTION INTRAMUSCULAR; INTRAVENOUS AS NEEDED
Status: DISCONTINUED | OUTPATIENT
Start: 2021-04-22 | End: 2021-04-22 | Stop reason: HOSPADM

## 2021-04-22 RX ORDER — CETIRIZINE HYDROCHLORIDE 10 MG/1
10 TABLET ORAL
COMMUNITY

## 2021-04-22 RX ORDER — ONDANSETRON 4 MG/1
4 TABLET, FILM COATED ORAL EVERY 6 HOURS PRN
Status: DISCONTINUED | OUTPATIENT
Start: 2021-04-22 | End: 2021-04-24 | Stop reason: HOSPADM

## 2021-04-22 RX ORDER — LIDOCAINE HYDROCHLORIDE 10 MG/ML
INJECTION, SOLUTION INFILTRATION; PERINEURAL AS NEEDED
Status: DISCONTINUED | OUTPATIENT
Start: 2021-04-22 | End: 2021-04-22 | Stop reason: HOSPADM

## 2021-04-22 RX ORDER — SODIUM CHLORIDE 0.9 % (FLUSH) 0.9 %
10 SYRINGE (ML) INJECTION AS NEEDED
Status: DISCONTINUED | OUTPATIENT
Start: 2021-04-22 | End: 2021-04-24 | Stop reason: HOSPADM

## 2021-04-22 RX ADMIN — EPTIFIBATIDE 2 MCG/KG/MIN: 75 INJECTION INTRAVENOUS at 21:04

## 2021-04-22 RX ADMIN — CETIRIZINE HYDROCHLORIDE 10 MG: 10 TABLET, FILM COATED ORAL at 21:08

## 2021-04-22 RX ADMIN — MONTELUKAST 10 MG: 10 TABLET, FILM COATED ORAL at 21:08

## 2021-04-22 RX ADMIN — CARVEDILOL 3.12 MG: 6.25 TABLET, FILM COATED ORAL at 20:24

## 2021-04-22 RX ADMIN — ATORVASTATIN CALCIUM 80 MG: 80 TABLET, FILM COATED ORAL at 20:24

## 2021-04-22 RX ADMIN — ASPIRIN 81 MG CHEWABLE TABLET 324 MG: 81 TABLET CHEWABLE at 12:19

## 2021-04-22 RX ADMIN — TICAGRELOR 90 MG: 90 TABLET ORAL at 20:25

## 2021-04-22 NOTE — PAYOR COMM NOTE
"Florida Barros (55 y.o. Female)     Date of Birth Social Security Number Address Home Phone MRN    [id number]  [id number] [address] [phone] [id number]    Protestant Marital Status          Roman Catholic        Admission Date Admission Type Admitting Provider Attending Provider Department, Room/Bed    4/22/21 Emergency Franklin Baig MD Breeding, Larry T, MD [facility] INTENSIVE CARE, I04/1    Discharge Date Discharge Disposition Discharge Destination                       Attending Provider: Franklin Baig MD    Allergies: Penicillins    Isolation: None   Infection: None   Code Status: CPR    Ht: 152.4 cm (60\")   Wt: 76.5 kg (168 lb 9.6 oz)    Admission Cmt: None   Principal Problem: None                Active Insurance as of 4/22/2021     Primary Coverage     Payor Plan Insurance Group Employer/Plan Group    [employer] EMPL[employer]EE [id number]     Payor Plan Address Payor Plan Phone Number Payor Plan Fax Number Effective Dates    [address] [phone]  4/1/2021 - None Entered    [facility] [address]       Subscriber Name Subscriber Birth Date Member ID       FLORIDA BARROS 1965 [id number]                 Emergency Contacts      (Rel.) Home Phone Work Phone Mobile Phone    Luis Alfredo Barros (Spouse) [phone] -- [phone]    Jaquelin Tanner (Sister) [phone] -- [phone]        Please review for inpatient auth     History & Physical      Franklin Baig MD at 04/22/21 4734                Patient Care Team:  Letty Diaz MD as PCP - General  Letty Diaz MD as PCP - Family Medicine    Chief complaint : Chest pain    Subjective     Patient is a 55 y.o. female presents to the emergency room with a 2-day history of diffuse anterior chest \"tightness\".  The patient was initially evaluated at [facility] in [facility] yesterday in the emergency room for similar chest discomfort.  " "Twelve-lead electrocardiogram showed voltage criteria for left ventricular hypertrophy with nonspecific ST-T wave changes.  Cardiac troponins were negative x2 sets.  The patient was started on standard medical therapy and scheduled for an outpatient vasodilator nuclear stress test early next week.  Patient indicates she had recurrent chest discomfort when she returned home and again later last evening.  She did not seek medical attention.  The patient had recurrent chest discomfort this morning which \"waxed and waned\" for most of the morning until the patient presented to the emergency room for intensification of her chest discomfort.  On arrival in the emergency room the patient was noted to have normal sinus rhythm with an acute injury current in the right mid precordial leads.  The STEMI protocol was activated.  She has a history of hypertension, dyslipidemia and type 2 diabetes mellitus.  She is a non-smoker.  She denies having chest symptoms prior to yesterday.  She denies shortness of breath.  She has had no orthopnea PND or lower extremity edema.  She has had no dizziness palpitations or syncope.    Review of Systems   Pertinent items are noted in HPI, all other systems reviewed and negative    History  Past Medical History:   Diagnosis Date   • PPD positive, treated      Past Surgical History:   Procedure Laterality Date   • LAPAROSCOPIC TUBAL LIGATION       Family History   Problem Relation Age of Onset   • Breast cancer Mother 70   • Breast cancer Maternal Aunt 80   • Breast cancer Maternal Aunt         approx age 30's   • Heart attack Father    • Heart attack Brother    • Ovarian cancer Neg Hx      Social History     Tobacco Use   • Smoking status: Never Smoker   • Smokeless tobacco: Never Used   Substance Use Topics   • Alcohol use: Yes     Comment: OCCASSIONALLY   • Drug use: No     E-cigarette/Vaping     E-cigarette/Vaping Substances     Medications Prior to Admission   Medication Sig Dispense Refill " Last Dose   • aspirin 81 MG EC tablet Take 1 tablet by mouth Daily.      • carvedilol (COREG) 3.125 MG tablet Take 1 tablet by mouth 2 (Two) Times a Day. 60 tablet 11      Allergies:  Penicillins    Objective     Vital Signs  Temp:  [97.5 °F (36.4 °C)] 97.5 °F (36.4 °C)  Heart Rate:  [67-78] 78  Resp:  [16-18] 18  BP: (110-167)/(64-94) 110/69    Physical Exam:      General Appearance:    Alert, cooperative, in no acute distress   Head:    Normocephalic, without obvious abnormality, atraumatic   Eyes:            Lids and lashes normal, conjunctivae and sclerae normal, no   icterus, no pallor, corneas clear, PERRLA   Ears:    Ears appear intact with no abnormalities noted   Throat:   No oral lesions, no thrush, oral mucosa moist   Neck:   No adenopathy, supple, trachea midline, no thyromegaly, no     carotid bruit, no JVD   Back:     No kyphosis present, no scoliosis present, no skin lesions,       erythema or scars, no tenderness to percussion or                   palpation,   range of motion normal   Lungs:     Clear to auscultation,respirations regular, even and                   unlabored    Heart:    Regular rhythm and normal rate, normal S1 and S2, no            murmur, no gallop, no rub, no click   Breast Exam:    Deferred   Abdomen:     Normal bowel sounds, no masses, no organomegaly, soft        non-tender, non-distended, no guarding, no rebound                 tenderness   Genitalia:    Deferred   Extremities:   Moves all extremities well, no edema, no cyanosis, no              redness   Pulses:   Pulses palpable and equal bilaterally   Skin:   No bleeding, bruising or rash   Lymph nodes:   No palpable adenopathy   Neurologic:   Cranial nerves 2 - 12 grossly intact, sensation intact, DTR        present and equal bilaterally     Results Review:    I reviewed the patient's new clinical results.    Assessment/Plan       ST elevation myocardial infarction involving left anterior descending (LAD) coronary artery  (CMS/[facility])    ST elevation myocardial infarction (STEMI) (CMS/[facility])      Ms. Ng has been engaged in a patient level discussion explaining the rationale for proceeding with emergency invasive testing/procedures.  The procedure of coronary angiography with the procedure of coronary catheter-based revascularization has been explained in detail to the patient in layman's terminology, including risks benefits and alternatives.  She expresses understanding and desire to proceed.  Further recommendations will be predicated on her catheterization findings.    I discussed the patients findings and my recommendations with patient.     Franklin Baig MD  04/22/21  14:03 EDT        Electronically signed by Franklin Baig MD at 04/22/21 140          Emergency Department Notes      Artemio Reeves, DO at 04/22/21 1214          Subjective   History of Present Illness    Chief Complaint: Chest pain  History of Present Illness: 55-year-old female presents with recurrent chest pain described as burning midsternal pain and pressure.  Reports that she became pale and diaphoretic this morning felt like she was going to pass out.  States that she had pain and was evaluated yesterday at outlying facility had seen cardiologist in ER, old record review reveals negative EKG 2 negative troponins.  Onset: Current episode today  Duration: Persist  Exacerbating / Alleviating factors: Tried juice with no relief she thought she might pass  Associated symptoms: None      Nurses Notes reviewed and agree, including vitals, allergies, social history and prior medical history.     REVIEW OF SYSTEMS: All systems reviewed and not pertinent unless noted.    Positive for: Chest pain near syncope, clamminess    Negative for: Fever cough hemoptysis syncope palpitations abdominal pain back pain urinary symptoms  Review of Systems    Past Medical History:   Diagnosis Date   • PPD positive, treated        Allergies   Allergen Reactions   • Penicillins  Itching       Past Surgical History:   Procedure Laterality Date   • LAPAROSCOPIC TUBAL LIGATION         Family History   Problem Relation Age of Onset   • Breast cancer Mother 70   • Breast cancer Maternal Aunt 80   • Breast cancer Maternal Aunt         approx age 30's   • Ovarian cancer Neg Hx        Social History     Socioeconomic History   • Marital status:      Spouse name: Not on file   • Number of children: Not on file   • Years of education: Not on file   • Highest education level: Not on file   Tobacco Use   • Smoking status: Never Smoker   • Smokeless tobacco: Never Used   Substance and Sexual Activity   • Alcohol use: Yes     Comment: OCCASSIONALLY   • Drug use: No   • Sexual activity: Defer           Objective   Physical Exam    CONSTITUTIONAL: Well developed, 55-year-old white female,  in no acute distress.  VITAL SIGNS: per nursing, reviewed and noted  SKIN: exposed skin with no rashes, ulcerations or petechiae.  EYES: perrla. EOMI.  ENT: Normal voice.    RESPIRATORY:  No increased work of breathing. No retractions.   CARDIOVASCULAR:  regular rate and rhythm, no murmurs.  Good Peripheral pulses. Good cap refill to extremities.   GI: Abdomen soft, nontender, normal bowel sounds. No hernia. No ascites.  MUSCULOSKELETAL:  No tenderness. Full ROM. Strength and tone grossly normal.  no spasms. no neck or back tenderness or spasm.   NEUROLOGIC: Alert, oriented x 3. No gross deficits. GCS 15.   PSYCH: appropriate affect.  : no bladder tenderness or distention, no CVA tenderness      Procedures    No attending physician procedures were performed on this patient.      ED Course  ED Course as of Apr 22 1219   Thu Apr 22, 2021   1217 EKG on arrival interpreted by me reveals septal anterior ST elevation.  T wave inversion lead III, aVR.  New compared to EKG obtained yesterday.  Consistent with ST elevation MI    [PF]   1217 Discussed with Dr. Baig at 12:11 PM will take to Cath Lab.    [PF]      ED  Course User Index  [PF] Artemio Reeves DO                                           [facility]  55-year-old female presents with chest pain, clamminess, near syncopal feeling.  Work-up consistent with ST elevation MI.  Patient will be sent to Cath Lab for further evaluation/management.  Final diagnoses:   ST elevation myocardial infarction (STEMI), unspecified artery (CMS/HCC)       ED Disposition  ED Disposition     ED Disposition Condition Comment    Send to Cath Lab            No follow-up provider specified.       Medication List      No changes were made to your prescriptions during this visit.          Artemio Reeves DO  04/22/21 1219      Electronically signed by Artemio Reeves DO at 04/22/21 1219         Current Facility-Administered Medications   Medication Dose Route Frequency Provider Last Rate Last Admin   • acetaminophen (TYLENOL) tablet 650 mg  650 mg Oral Q4H PRN Franklin Baig MD       • ALPRAZolam (XANAX) tablet 0.25 mg  0.25 mg Oral TID PRN Franklin Baig MD       • [START ON 4/23/2021] aspirin EC tablet 81 mg  81 mg Oral Daily Franklin Baig MD       • atorvastatin (LIPITOR) tablet 80 mg  80 mg Oral Nightly Franklin Baig MD       • carvedilol (COREG) tablet 3.125 mg  3.125 mg Oral BID Franklin Baig MD       • diphenhydrAMINE (BENADRYL) injection 25 mg  25 mg Intravenous Q6H PRN Franklin Baig MD       • eptifibatide (INTEGRILIN) 75 mg in 100 mL solution  2 mcg/kg/min (Order-Specific) Intravenous Continuous Franklin Baig MD 12.16 mL/hr at 04/22/21 1445 2 mcg/kg/min at 04/22/21 1445   • HYDROcodone-acetaminophen (NORCO) 5-325 MG per tablet 1 tablet  1 tablet Oral Q4H PRN Franklin Baig MD       • Morphine sulfate (PF) injection 4 mg  4 mg Intravenous Q1H PRN Franklin Baig MD        And   • naloxone (NARCAN) injection 0.4 mg  0.4 mg Intravenous Q5 Min PRN Franklin Baig MD       • ondansetron (ZOFRAN) tablet 4 mg  4 mg Oral Q6H PRN Franklin Baig MD         Or   • ondansetron (ZOFRAN) injection 4 mg  4 mg Intravenous Q6H PRN Franklin Baig MD       • sodium chloride 0.9 % flush 10 mL  10 mL Intravenous PRN Franklin Baig MD       • sodium chloride 0.9 % infusion  100 mL/hr Intravenous Continuous Franklin Baig  mL/hr at 04/22/21 1448 100 mL/hr at 04/22/21 1448   • temazepam (RESTORIL) capsule 15 mg  15 mg Oral Nightly PRN Franklin Baig MD       • ticagrelor (BRILINTA) tablet 90 mg  90 mg Oral Q12H Franklin Baig MD           Lab Results (last 24 hours)     Procedure Component Value Units Date/Time    POC Activated Clotting Time [[id number]]  (Abnormal) Collected: 04/22/21 1308    Specimen: Blood Updated: 04/22/21 1513     Activated Clotting Time  221.0000 Seconds      Comment: Serial Number: [id number]:  [id number]       [place] Draw [[id number]] Collected: 04/22/21 1217    Specimen: Blood Updated: 04/22/21 1330    Narrative:      The following orders were created for panel order [place] Draw.  Procedure                               Abnormality         Status                     ---------                               -----------         ------                     Light Blue Top[[id number]]                                   Final result               Green Top (Gel)[[id number]]                                  Final result               Lavender Top[[id number]]                                     Final result               Gold Top - SST[[id number]]                                   Final result               Green Top (No Gel)[[id number]]                               Final result                 Please view results for these tests on the individual orders.    Light Blue Top [[id number]] Collected: 04/22/21 1217    Specimen: Blood Updated: 04/22/21 1330     Extra Tube hold for add-on     Comment: Auto resulted       Lavender Top [[id number]] Collected: 04/22/21 1217    Specimen: Blood Updated: 04/22/21 1330     Extra Tube hold for add-on      Comment: Auto resulted       Green Top (No Gel) [[id number]] Collected: 04/22/21 1217    Specimen: Blood Updated: 04/22/21 1330     Extra Tube Hold for add-ons.     Comment: Auto resulted.       Gold Top - SST [[id number]] Collected: 04/22/21 1217    Specimen: Blood Updated: 04/22/21 1330     Extra Tube Hold for add-ons.     Comment: Auto resulted.       Green Top (Gel) [[id number]] Collected: 04/22/21 1217    Specimen: Blood Updated: 04/22/21 1330     Extra Tube Hold for add-ons.     Comment: Auto resulted.       COVID PRE-OP / PRE-PROCEDURE SCREENING ORDER (NO ISOLATION) - Swab, Nasal Cavity [[id number]]  (Normal) Collected: 04/22/21 1221    Specimen: Swab from Nasal Cavity Updated: 04/22/21 1257    Narrative:      The following orders were created for panel order COVID PRE-OP / PRE-PROCEDURE SCREENING ORDER (NO ISOLATION) - Swab, Nasal Cavity.  Procedure                               Abnormality         Status                     ---------                               -----------         ------                     COVID-19,[employer] [employer] IN-RON...[[id number]]  Normal              Final result                 Please view results for these tests on the individual orders.    COVID-19,[employer] [employer] IN-HOUSE,Nasal Swab No Transport Media 3-4 HR TAT - Swab, Nasal Cavity [[id number]]  (Normal) Collected: 04/22/21 1221    Specimen: Swab from Nasal Cavity Updated: 04/22/21 1257     COVID19 Not Detected    Narrative:      Fact sheet for providers: https://www.fda.gov/media/079762/download     Fact sheet for patients: https://www.fda.gov/media/400395/download    Test performed by PCR.    Consider negative results in combination with clinical observations, patient history, and epidemiological information.    Troponin [[id number]]  (Normal) Collected: 04/22/21 1217    Specimen: Blood Updated: 04/22/21 1242     Troponin T <0.010 ng/mL     Narrative:      Troponin T Reference Range:  <= 0.03 ng/mL-   Negative for AMI  >0.03 ng/mL-     Abnormal  for myocardial necrosis.  Clinicians would have to utilize clinical acumen, EKG, Troponin and serial changes to determine if it is an Acute Myocardial Infarction or myocardial injury due to an underlying chronic condition.       Results may be falsely decreased if patient taking Biotin.      Comprehensive Metabolic Panel [[id number]]  (Abnormal) Collected: 04/22/21 1217    Specimen: Blood Updated: 04/22/21 1240     Glucose 114 mg/dL      BUN 11 mg/dL      Creatinine 0.87 mg/dL      Sodium 140 mmol/L      Potassium 3.5 mmol/L      Chloride 104 mmol/L      CO2 25.9 mmol/L      Calcium 9.1 mg/dL      Total Protein 6.9 g/dL      Albumin 4.10 g/dL      ALT (SGPT) 47 U/L      AST (SGOT) 30 U/L      Alkaline Phosphatase 63 U/L      Total Bilirubin 0.3 mg/dL      eGFR Non African Amer 67 mL/min/1.73      Globulin 2.8 gm/dL      A/G Ratio 1.5 g/dL      BUN/Creatinine Ratio 12.6     Anion Gap 10.1 mmol/L     Narrative:      GFR Normal >60  Chronic Kidney Disease <60  Kidney Failure <15      CBC & Differential [[id number]]  (Abnormal) Collected: 04/22/21 1217    Specimen: Blood Updated: 04/22/21 1226    Narrative:      The following orders were created for panel order CBC & Differential.  Procedure                               Abnormality         Status                     ---------                               -----------         ------                     CBC Auto Differential[[id number]]        Abnormal            Final result                 Please view results for these tests on the individual orders.    CBC Auto Differential [[id number]]  (Abnormal) Collected: 04/22/21 1217    Specimen: Blood Updated: 04/22/21 1226     WBC 8.00 10*3/mm3      RBC 3.71 10*6/mm3      Hemoglobin 11.6 g/dL      Hematocrit 33.6 %      MCV 90.6 fL      MCH 31.3 pg      MCHC 34.5 g/dL      RDW 11.9 %      RDW-SD 39.3 fl      MPV 11.0 fL      Platelets 309 10*3/mm3      Neutrophil % 63.9 %      Lymphocyte % 23.3 %      Monocyte % 9.4 %       Eosinophil % 2.1 %      Basophil % 0.8 %      Immature Grans % 0.5 %      Neutrophils, Absolute 5.12 10*3/mm3      Lymphocytes, Absolute 1.86 10*3/mm3      Monocytes, Absolute 0.75 10*3/mm3      Eosinophils, Absolute 0.17 10*3/mm3      Basophils, Absolute 0.06 10*3/mm3      Immature Grans, Absolute 0.04 10*3/mm3      nRBC 0.0 /100 WBC         Imaging Results (Last 24 Hours)     Procedure Component Value Units Date/Time    XR Chest 1 View [[id number]] Collected: 04/22/21 1231     Updated: 04/22/21 1234    Narrative:      PROCEDURE: XR CHEST 1 VW-     HISTORY: Chest pain protocol     COMPARISON: 07/13/2010.     FINDINGS: The heart is normal in size. The mediastinum is unremarkable.  There is a nodular opacity in the right midlung field. The lungs are  otherwise clear. There is no pneumothorax.  There are no acute osseous  abnormalities.       Impression:      Nodular opacity in the right midlung field which should be  followed up with a CT of the chest.     Continued followup is recommended.     This report was finalized on 4/22/2021 12:31 PM by James Hooker M.D..        ECG/EMG Results (last 24 hours)     Procedure Component Value Units Date/Time    Adult Transthoracic Echo Complete W/ Cont if Necessary Per Protocol [[id number]] Collected: 04/22/21 1442     Updated: 04/22/21 1510     BSA 1.7 m^2      IVSd 1.6 cm      IVSs 2.0 cm      LVIDd 3.8 cm      LVIDs 1.9 cm      LVPWd 1.1 cm      BH CV ECHO SALTY - LVPWS 1.7 cm      IVS/LVPW 1.4     FS 51.2 %      EDV(Teich) 62.3 ml      ESV(Teich) 10.6 ml      EF(Teich) 83.0 %      EDV(cubed) 55.3 ml      ESV(cubed) 6.4 ml      EF(cubed) 88.4 %      % IVS thick 30.8 %      % LVPW thick 50.9 %      LV mass(C)d 182.0 grams      LV mass(C)dI 105.0 grams/m^2      LV mass(C)s 140.6 grams      LV mass(C)sI 81.1 grams/m^2      SV(Teich) 51.8 ml      SI(Teich) 29.9 ml/m^2      SV(cubed) 48.9 ml      SI(cubed) 28.2 ml/m^2      Ao root diam 1.9 cm      Ao root area 2.9 cm^2      LA  dimension 3.2 cm      asc Aorta Diam 2.5 cm      LA/Ao 1.7     LVOT diam 1.3 cm      LVOT area 1.3 cm^2      LVOT area(traced) 1.3 cm^2      LAd major 5.4 cm      LVLd ap4 8.3 cm      EDV(MOD-sp4) 84.4 ml      LVLs ap4 7.6 cm      ESV(MOD-sp4) 40.8 ml      EF(MOD-sp4) 51.7 %      LVLd ap2 8.1 cm      EDV(MOD-sp2) 85.2 ml      LVLs ap2 7.5 cm      ESV(MOD-sp2) 30.7 ml      EF(MOD-sp2) 64.0 %      LA volume 43.7 ml      EF(MOD-bp) 59.0 %      SV(MOD-sp4) 43.6 ml      SI(MOD-sp4) 25.2 ml/m^2      SV(MOD-sp2) 54.5 ml      SI(MOD-sp2) 31.4 ml/m^2      Ao root area (BSA corrected) 1.1     LV Betts Vol (BSA corrected) 48.7 ml/m^2      LV Sys Vol (BSA corrected) 23.5 ml/m^2      TAPSE (>1.6) 2.3 cm      LA Volume Index 25.2 ml/m^2      MV E max nemesio 113.0 cm/sec      MV A max nemesio 148.0 cm/sec      MV E/A 0.76     MV V2 max 144.0 cm/sec      MV max PG 8.3 mmHg      MV V2 mean 96.3 cm/sec      MV mean PG 4.0 mmHg      MV V2 VTI 43.0 cm      MVA(VTI) 0.64 cm^2      MV dec time 0.17 sec      Ao pk nemesio 183.0 cm/sec      Ao max PG 13.0 mmHg      Ao max PG (full) 10.1 mmHg      Ao V2 mean 111.0 cm/sec      Ao mean PG 6.0 mmHg      Ao mean PG (full) 4.0 mmHg      Ao V2 VTI 35.3 cm      HEMALATHA(I,A) 0.78 cm^2      HEMALATHA(I,D) 0.78 cm^2      HEMALATHA(V,A) 0.61 cm^2      HEMALATHA(V,D) 0.61 cm^2      LV V1 max PG 2.9 mmHg      LV V1 mean PG 2.0 mmHg      LV V1 max 85.0 cm/sec      LV V1 mean 59.0 cm/sec      LV V1 VTI 21.2 cm      SV(Ao) 102.2 ml      SI(Ao) 59.0 ml/m^2      SV(LVOT) 27.7 ml      SI(LVOT) 16.0 ml/m^2      PA V2 max 93.2 cm/sec      PA max PG 3.5 mmHg      PA max PG (full) 1.7 mmHg      PA V2 mean 60.2 cm/sec      PA mean PG 2.0 mmHg      PA mean PG (full) 1.0 mmHg      PA V2 VTI 19.8 cm      PA acc time 0.13 sec      RV V1 max PG 1.7 mmHg      RV V1 mean PG 1.0 mmHg      RV V1 max 65.8 cm/sec      RV V1 mean 44.5 cm/sec      RV V1 VTI 15.0 cm      RAP systole 3.0 mmHg      PA pr(Accel) 20.5 mmHg      Pulm Sys Nemesio 60.3 cm/sec      Pulm  Betts Nemesio 42.5 cm/sec      Pulm S/D 1.4     Pulm A Revs Dur 0.13 sec      Pulm A Revs Nemesio 32.1 cm/sec      RV Base 2.7 cm      RV Length 7.6 cm      RV Mid 1.7 cm      RV S' 8.8 cm/sec      Lat E/e'  18.6     Med E/e' 15.5     Lat Peak E' Nemesio 6.1 cm/sec      Med Peak E' Nemesio 7.3 cm/sec       CV ECHO SALTY - BZI_BMI 32.8 kilograms/m^2       CV ECHO SALTY - BSA(HAYCOCK) 1.8 m^2       CV ECHO SALTY - BZI_METRIC_WEIGHT 76.2 kg       CV ECHO SALTY - BZI_METRIC_HEIGHT 152.4 cm      Avg E/e' ratio 16.87          Physician Progress Notes (last 24 hours) (Notes from 04/21/21 1529 through 04/22/21 1529)    No notes of this type exist for this encounter.         Consult Notes (last 24 hours) (Notes from 04/21/21 1529 through 04/22/21 1529)    No notes of this type exist for this encounter.

## 2021-04-22 NOTE — PLAN OF CARE
Goal Outcome Evaluation:         Spoke with pt at her bedside.  Pt was awake and conversational.  I provided support at pt talked about the symptoms that brought her to the hospital, and that she had just undergone a heart cath.  I expressed a word of thanks to her for paying attention to the symptoms she was having and getting herself to the hospital to take care for herself.  I offered a prayer at her request and will provide a scripture tent for her bedside table.

## 2021-04-22 NOTE — NURSING NOTE
Referral received for Phase II Cardiac Rehab. Staff reviewed chart and patient has qualifying diagnosis for Phase II Cardiac Rehab. Wasn't able to see at time I visited, due to Pt having ECHO done.

## 2021-04-22 NOTE — H&P
"      Patient Care Team:  Letty Diaz MD as PCP - General  Letty Diaz MD as PCP - Family Medicine    Chief complaint : Chest pain    Subjective     Patient is a 55 y.o. female presents to the emergency room with a 2-day history of diffuse anterior chest \"tightness\".  The patient was initially evaluated at [facility] in [facility] yesterday in the emergency room for similar chest discomfort.  Twelve-lead electrocardiogram showed voltage criteria for left ventricular hypertrophy with nonspecific ST-T wave changes.  Cardiac troponins were negative x2 sets.  The patient was started on standard medical therapy and scheduled for an outpatient vasodilator nuclear stress test early next week.  Patient indicates she had recurrent chest discomfort when she returned home and again later last evening.  She did not seek medical attention.  The patient had recurrent chest discomfort this morning which \"waxed and waned\" for most of the morning until the patient presented to the emergency room for intensification of her chest discomfort.  On arrival in the emergency room the patient was noted to have normal sinus rhythm with an acute injury current in the right mid precordial leads.  The STEMI protocol was activated.  She has a history of hypertension, dyslipidemia and type 2 diabetes mellitus.  She is a non-smoker.  She denies having chest symptoms prior to yesterday.  She denies shortness of breath.  She has had no orthopnea PND or lower extremity edema.  She has had no dizziness palpitations or syncope.    Review of Systems   Pertinent items are noted in HPI, all other systems reviewed and negative    History  Past Medical History:   Diagnosis Date   • PPD positive, treated      Past Surgical History:   Procedure Laterality Date   • LAPAROSCOPIC TUBAL LIGATION       Family History   Problem Relation Age of Onset   • Breast cancer Mother 70   • Breast cancer Maternal Aunt 80   • Breast cancer Maternal " Aunt         approx age 30's   • Heart attack Father    • Heart attack Brother    • Ovarian cancer Neg Hx      Social History     Tobacco Use   • Smoking status: Never Smoker   • Smokeless tobacco: Never Used   Substance Use Topics   • Alcohol use: Yes     Comment: OCCASSIONALLY   • Drug use: No     E-cigarette/Vaping     E-cigarette/Vaping Substances     Medications Prior to Admission   Medication Sig Dispense Refill Last Dose   • aspirin 81 MG EC tablet Take 1 tablet by mouth Daily.      • carvedilol (COREG) 3.125 MG tablet Take 1 tablet by mouth 2 (Two) Times a Day. 60 tablet 11      Allergies:  Penicillins    Objective     Vital Signs  Temp:  [97.5 °F (36.4 °C)] 97.5 °F (36.4 °C)  Heart Rate:  [67-78] 78  Resp:  [16-18] 18  BP: (110-167)/(64-94) 110/69    Physical Exam:      General Appearance:    Alert, cooperative, in no acute distress   Head:    Normocephalic, without obvious abnormality, atraumatic   Eyes:            Lids and lashes normal, conjunctivae and sclerae normal, no   icterus, no pallor, corneas clear, PERRLA   Ears:    Ears appear intact with no abnormalities noted   Throat:   No oral lesions, no thrush, oral mucosa moist   Neck:   No adenopathy, supple, trachea midline, no thyromegaly, no     carotid bruit, no JVD   Back:     No kyphosis present, no scoliosis present, no skin lesions,       erythema or scars, no tenderness to percussion or                   palpation,   range of motion normal   Lungs:     Clear to auscultation,respirations regular, even and                   unlabored    Heart:    Regular rhythm and normal rate, normal S1 and S2, no            murmur, no gallop, no rub, no click   Breast Exam:    Deferred   Abdomen:     Normal bowel sounds, no masses, no organomegaly, soft        non-tender, non-distended, no guarding, no rebound                 tenderness   Genitalia:    Deferred   Extremities:   Moves all extremities well, no edema, no cyanosis, no              redness    Pulses:   Pulses palpable and equal bilaterally   Skin:   No bleeding, bruising or rash   Lymph nodes:   No palpable adenopathy   Neurologic:   Cranial nerves 2 - 12 grossly intact, sensation intact, DTR        present and equal bilaterally     Results Review:    I reviewed the patient's new clinical results.    Assessment/Plan       ST elevation myocardial infarction involving left anterior descending (LAD) coronary artery (CMS/[facility])    ST elevation myocardial infarction (STEMI) (CMS/[facility])      Ms. Ng has been engaged in a patient level discussion explaining the rationale for proceeding with emergency invasive testing/procedures.  The procedure of coronary angiography with the procedure of coronary catheter-based revascularization has been explained in detail to the patient in layman's terminology, including risks benefits and alternatives.  She expresses understanding and desire to proceed.  Further recommendations will be predicated on her catheterization findings.    I discussed the patients findings and my recommendations with patient.     Franklin Baig MD  04/22/21  14:03 EDT

## 2021-04-22 NOTE — ED PROVIDER NOTES
Subjective   History of Present Illness    Chief Complaint: Chest pain  History of Present Illness: 55-year-old female presents with recurrent chest pain described as burning midsternal pain and pressure.  Reports that she became pale and diaphoretic this morning felt like she was going to pass out.  States that she had pain and was evaluated yesterday at outlying facility had seen cardiologist in ER, old record review reveals negative EKG 2 negative troponins.  Onset: Current episode today  Duration: Persist  Exacerbating / Alleviating factors: Tried juice with no relief she thought she might pass  Associated symptoms: None      Nurses Notes reviewed and agree, including vitals, allergies, social history and prior medical history.     REVIEW OF SYSTEMS: All systems reviewed and not pertinent unless noted.    Positive for: Chest pain near syncope, clamminess    Negative for: Fever cough hemoptysis syncope palpitations abdominal pain back pain urinary symptoms  Review of Systems    Past Medical History:   Diagnosis Date   • PPD positive, treated        Allergies   Allergen Reactions   • Penicillins Itching       Past Surgical History:   Procedure Laterality Date   • CARDIAC CATHETERIZATION N/A 4/22/2021    Procedure: Left Heart Cath;  Surgeon: Franklin Baig MD;  Location: [facility] INVASIVE LOCATION;  Service: Cardiology;  Laterality: N/A;   • CARDIAC CATHETERIZATION N/A 4/22/2021    Procedure: Coronary angiography;  Surgeon: Franklin Baig MD;  Location: [facility] INVASIVE LOCATION;  Service: Cardiology;  Laterality: N/A;   • CARDIAC CATHETERIZATION N/A 4/22/2021    Procedure: Stent CLARISSA coronary;  Surgeon: Franklin Baig MD;  Location: [facility] INVASIVE LOCATION;  Service: Cardiology;  Laterality: N/A;   • CARDIAC CATHETERIZATION N/A 4/22/2021    Procedure: Optical Coherent Tomography;  Surgeon: Franklin Baig MD;  Location: [facility] INVASIVE LOCATION;  Service: Cardiology;  Laterality:  N/A;   • LAPAROSCOPIC TUBAL LIGATION         Family History   Problem Relation Age of Onset   • Breast cancer Mother 70   • Breast cancer Maternal Aunt 80   • Breast cancer Maternal Aunt         approx age 30's   • Heart attack Father    • Heart attack Brother    • Ovarian cancer Neg Hx        Social History     Socioeconomic History   • Marital status:      Spouse name: Not on file   • Number of children: Not on file   • Years of education: Not on file   • Highest education level: Not on file   Tobacco Use   • Smoking status: Never Smoker   • Smokeless tobacco: Never Used   Substance and Sexual Activity   • Alcohol use: Yes     Comment: OCCASSIONALLY   • Drug use: No   • Sexual activity: Defer           Objective   Physical Exam    CONSTITUTIONAL: Well developed, 55-year-old white female,  in no acute distress.  VITAL SIGNS: per nursing, reviewed and noted  SKIN: exposed skin with no rashes, ulcerations or petechiae.  EYES: perrla. EOMI.  ENT: Normal voice.    RESPIRATORY:  No increased work of breathing. No retractions.   CARDIOVASCULAR:  regular rate and rhythm, no murmurs.  Good Peripheral pulses. Good cap refill to extremities.   GI: Abdomen soft, nontender, normal bowel sounds. No hernia. No ascites.  MUSCULOSKELETAL:  No tenderness. Full ROM. Strength and tone grossly normal.  no spasms. no neck or back tenderness or spasm.   NEUROLOGIC: Alert, oriented x 3. No gross deficits. GCS 15.   PSYCH: appropriate affect.  : no bladder tenderness or distention, no CVA tenderness      Procedures     No attending physician procedures were performed on this patient.      ED Course  ED Course as of Apr 23 2007   Thu Apr 22, 2021   1217 EKG on arrival interpreted by me reveals septal anterior ST elevation.  T wave inversion lead III, aVR.  New compared to EKG obtained yesterday.  Consistent with ST elevation MI    [PF]   1217 Discussed with Dr. Baig at 12:11 PM will take to Cath Lab.    [PF]   1227 WBC: 8.00  [PF]   1228 Hemoglobin(!): 11.6 [PF]   1228 Hematocrit(!): 33.6 [PF]   1228 Platelets: 309 [PF]   Fri Apr 23, 2021 1945 Troponin T: <0.010 [PF]   1945 COVID19: Not Detected [PF]   1946 Glucose(!): 114 [PF]   1946 BUN: 11 [PF]   1946 Creatinine: 0.87 [PF]   1946 Sodium: 140 [PF]   1946 Potassium: 3.5 [PF]   1946 Chloride: 104 [PF]   1946 CO2: 25.9 [PF]   1946 Calcium: 9.1 [PF]   1946 Total Protein: 6.9 [PF]   1946 Albumin: 4.10 [PF]   1946 ALT (SGPT)(!): 47 [PF]   1946 AST (SGOT): 30 [PF]   1946 Alkaline Phosphatase: 63 [PF]   1946 Total Bilirubin: 0.3 [PF]   1946 FINDINGS: The heart is normal in size. The mediastinum is unremarkable.  There is a nodular opacity in the right midlung field. The lungs are  otherwise clear. There is no pneumothorax.  There are no acute osseous  abnormalities.     IMPRESSION:  Nodular opacity in the right midlung field which should be  followed up with a CT of the chest.     Continued followup is recommended.     This report was finalized on 4/22/2021 12:31 PM by James Hooker M.D..    [PF]   1949 Chest xray returned final report showing nodular opacity in the right midlung field which should be followed with a CT chest.  I contacted  cardiologist covering for Dr. Baig, provided him with the radiology read. Advised he will follow up on that with patient.     [PF]      ED Course User Index  [PF] Artemio Reeves W, DO                                           MDM  55-year-old female presents with chest pain, clamminess, near syncopal feeling.  Work-up consistent with ST elevation MI.  Patient will be sent to Cath Lab for further evaluation/management. Patient transferred to cath lab before bulk of testing returned.   Final diagnoses:   ST elevation myocardial infarction (STEMI), unspecified artery (CMS/HCC)       ED Disposition  ED Disposition     ED Disposition Condition Comment    Send to Cath Lab            No follow-up provider specified.       Medication List      ASK  your doctor about these medications    cetirizine 10 MG tablet  Commonly known as: zyrTEC  Ask about: Which instructions should I use?     ferrous sulfate 140 (45 Fe) MG tablet controlled-release tablet  Ask about: Which instructions should I use?     montelukast 10 MG tablet  Commonly known as: SINGULAIR  Ask about: Which instructions should I use?             Artemio Reeves, DO  04/22/21 1219       Artemio Reeves, DO  04/23/21 1947       Artemio Reeves, DO  04/23/21 2007

## 2021-04-23 LAB
ANION GAP SERPL CALCULATED.3IONS-SCNC: 9.2 MMOL/L (ref 5–15)
BUN SERPL-MCNC: 11 MG/DL (ref 6–20)
BUN/CREAT SERPL: 14.5 (ref 7–25)
CALCIUM SPEC-SCNC: 8.7 MG/DL (ref 8.6–10.5)
CHLORIDE SERPL-SCNC: 107 MMOL/L (ref 98–107)
CHOLEST SERPL-MCNC: 147 MG/DL (ref 0–200)
CO2 SERPL-SCNC: 20.8 MMOL/L (ref 22–29)
CREAT SERPL-MCNC: 0.76 MG/DL (ref 0.57–1)
DEPRECATED RDW RBC AUTO: 40.7 FL (ref 37–54)
ERYTHROCYTE [DISTWIDTH] IN BLOOD BY AUTOMATED COUNT: 12.2 % (ref 12.3–15.4)
GFR SERPL CREATININE-BSD FRML MDRD: 79 ML/MIN/1.73
GLUCOSE SERPL-MCNC: 152 MG/DL (ref 65–99)
HBA1C MFR BLD: 5 % (ref 4.8–5.6)
HCT VFR BLD AUTO: 28.7 % (ref 34–46.6)
HDLC SERPL-MCNC: 30 MG/DL (ref 40–60)
HGB BLD-MCNC: 9.7 G/DL (ref 12–15.9)
LDLC SERPL CALC-MCNC: 90 MG/DL (ref 0–100)
LDLC/HDLC SERPL: 2.9 {RATIO}
MCH RBC QN AUTO: 31.3 PG (ref 26.6–33)
MCHC RBC AUTO-ENTMCNC: 33.8 G/DL (ref 31.5–35.7)
MCV RBC AUTO: 92.6 FL (ref 79–97)
PLATELET # BLD AUTO: 293 10*3/MM3 (ref 140–450)
PMV BLD AUTO: 11.4 FL (ref 6–12)
POTASSIUM SERPL-SCNC: 3.7 MMOL/L (ref 3.5–5.2)
RBC # BLD AUTO: 3.1 10*6/MM3 (ref 3.77–5.28)
SODIUM SERPL-SCNC: 137 MMOL/L (ref 136–145)
TRIGL SERPL-MCNC: 150 MG/DL (ref 0–150)
VLDLC SERPL-MCNC: 27 MG/DL (ref 5–40)
WBC # BLD AUTO: 15.58 10*3/MM3 (ref 3.4–10.8)

## 2021-04-23 PROCEDURE — 25010000002 EPTIFIBATIDE PER 5 MG: Performed by: INTERNAL MEDICINE

## 2021-04-23 PROCEDURE — 85027 COMPLETE CBC AUTOMATED: CPT | Performed by: INTERNAL MEDICINE

## 2021-04-23 PROCEDURE — 83036 HEMOGLOBIN GLYCOSYLATED A1C: CPT | Performed by: INTERNAL MEDICINE

## 2021-04-23 PROCEDURE — 80061 LIPID PANEL: CPT | Performed by: INTERNAL MEDICINE

## 2021-04-23 PROCEDURE — 80048 BASIC METABOLIC PNL TOTAL CA: CPT | Performed by: INTERNAL MEDICINE

## 2021-04-23 PROCEDURE — 99233 SBSQ HOSP IP/OBS HIGH 50: CPT | Performed by: INTERNAL MEDICINE

## 2021-04-23 RX ORDER — LISINOPRIL 5 MG/1
5 TABLET ORAL
Status: DISCONTINUED | OUTPATIENT
Start: 2021-04-23 | End: 2021-04-24 | Stop reason: HOSPADM

## 2021-04-23 RX ORDER — UREA 10 %
140 LOTION (ML) TOPICAL EVERY OTHER DAY
COMMUNITY

## 2021-04-23 RX ADMIN — LISINOPRIL 5 MG: 5 TABLET ORAL at 09:46

## 2021-04-23 RX ADMIN — CETIRIZINE HYDROCHLORIDE 10 MG: 10 TABLET, FILM COATED ORAL at 20:37

## 2021-04-23 RX ADMIN — TICAGRELOR 90 MG: 90 TABLET ORAL at 20:37

## 2021-04-23 RX ADMIN — TICAGRELOR 90 MG: 90 TABLET ORAL at 08:28

## 2021-04-23 RX ADMIN — MONTELUKAST 10 MG: 10 TABLET, FILM COATED ORAL at 20:37

## 2021-04-23 RX ADMIN — ACETAMINOPHEN 650 MG: 325 TABLET ORAL at 19:45

## 2021-04-23 RX ADMIN — ATORVASTATIN CALCIUM 80 MG: 80 TABLET, FILM COATED ORAL at 20:37

## 2021-04-23 RX ADMIN — CARVEDILOL 3.12 MG: 6.25 TABLET, FILM COATED ORAL at 08:28

## 2021-04-23 RX ADMIN — ASPIRIN 81 MG: 81 TABLET, COATED ORAL at 08:28

## 2021-04-23 RX ADMIN — ACETAMINOPHEN 650 MG: 325 TABLET ORAL at 02:18

## 2021-04-23 RX ADMIN — EPTIFIBATIDE 2 MCG/KG/MIN: 75 INJECTION INTRAVENOUS at 06:10

## 2021-04-23 RX ADMIN — CARVEDILOL 3.12 MG: 6.25 TABLET, FILM COATED ORAL at 20:37

## 2021-04-23 RX ADMIN — ACETAMINOPHEN 650 MG: 325 TABLET ORAL at 15:49

## 2021-04-23 NOTE — PLAN OF CARE
Goal Outcome Evaluation:    Pt rested in intervals this shift with no acute events. VS WNL. No complaints of new discomfort or distress.

## 2021-04-23 NOTE — PROGRESS NOTES
"Adult Nutrition  Assessment/PES    Patient Name:  Sapna Ng  YOB: 1965  MRN: [id number]  Admit Date:  4/22/2021    Assessment Date:  4/23/2021    Comments:      Recommend:  1. Continue current diet order as medically appropriate and tolerated.  2. Encourage PO intake. PO intake average ~100% x 1 meal.  3. Consider a multivitamin with minerals daily.     RD provided education materials on Cardiac/Consistent Carbohydrate nutrition to pt. Pt denies any further nutrition-related questions at this time. She has RD contact information and is encouraged to reach out with any future nutrition-related needs.    RD to follow pt and available PRN.      Reason for Assessment     Row Name 04/23/21 1005          Reason for Assessment    Reason For Assessment  diagnosis/disease state     Diagnosis  cardiac disease;diabetes diagnosis/complications STEMI, HTN, DLD, DM 2, CAD           Anthropometrics     Row Name 04/23/21 1049 04/23/21 1006       Anthropometrics    Height  --  152.4 cm (60\")    Weight  78.2 kg (172 lb 6.4 oz)  --       Ideal Body Weight (IBW)    Ideal Body Weight (IBW) (kg)  --  45.86    Row Name 04/23/21 0400          Anthropometrics    Weight  73.5 kg (162 lb 1.6 oz)         Labs/Tests/Procedures/Meds     Row Name 04/23/21 1006          Labs/Procedures/Meds    Lab Results Reviewed  reviewed, pertinent     Lab Results Comments  High: Gluc, ALT        Medications    Pertinent Medications Reviewed  reviewed, pertinent     Pertinent Medications Comments  Lipitor         Physical Findings     Row Name 04/23/21 1006          Physical Findings    Overall Physical Appearance  obese         Estimated/Assessed Needs     Row Name 04/23/21 1006          Calculation Measurements    Weight Used For Calculations  73.5 kg (162 lb 1.6 oz) Actual BW     Height  152.4 cm (60\")        Estimated/Assessed Needs    Additional Documentation  Calorie Requirements (Group);Protein Requirements (Group);[place] " Equation (Group);Fluid Requirements (Group)        Calorie Requirements    Estimated Calorie Need Method  [place]     Estimated Calorie Requirement Comment  1750 - 1950        [place] Equation    RMR ([place] Equation)  1246.78     [place] Activity Factors  1.4 - 1.5     Activity Factors ([place])  1745.492 - 1870.17        Protein Requirements    Weight Used For Protein Calculations  73.5 kg (162 lb 1.6 oz) Actual BW     Est Protein Requirement Amount (gms/kg)  -- 66 - 97 gm        Fluid Requirements    Fluid Requirements (mL/day)  1750     Estimated Fluid Requirement Method  other (see comments) 1 mL/kg     RDA Method (mL)  1750         Nutrition Prescription Ordered     Row Name 04/23/21 1142          Nutrition Prescription PO    Current PO Diet  Regular     Common Modifiers  Cardiac;Consistent Carbohydrate         Evaluation of Received Nutrient/Fluid Intake     Row Name 04/23/21 1143          PO Evaluation    Number of Days PO Intake Evaluated  1 day     Number of Meals  1     % PO Intake  100               Problem/Interventions:  Problem 1     Row Name 04/23/21 1143          Nutrition Diagnoses Problem 1    Problem 1  Impaired Nutrient Utilization     Etiology (related to)  Medical Diagnosis     Endocrine  DM2     Signs/Symptoms (evidenced by)  Biochemical     Specific Labs Noted  Glucose         Problem 2     Row Name 04/23/21 1144          Nutrition Diagnoses Problem 2    Problem 2  Overweight/Obesity     Etiology (related to)  Factors Affecting Nutrition     Food Habit/Preferences  Large Meals     Signs/Symptoms (evidenced by)  BMI     BMI  30 - 34.9             Intervention Goal     Row Name 04/23/21 1144          Intervention Goal    General  Meet nutritional needs for age/condition;Improved nutrition related lab(s)     PO  Meet estimated needs;Maintain intake;PO intake (%)     PO Intake %  -- 75 - 100%     Weight  No significant weight loss         Nutrition  Intervention     Row Name 04/23/21 1144          Nutrition Intervention    RD/Tech Action  Follow Tx progress;Encourage intake         Nutrition Prescription     Row Name 04/23/21 1144          Nutrition Prescription PO    PO Prescription  Other (comment) Continue current diet order as medically appropriate and tolerated     New PO Prescription Ordered?  No, recommended        Other Orders    Obtain Weight  Daily     Obtain Weight Ordered?  No, recommended     Supplement  Vitamin mineral supplement     Supplement Ordered?  No, recommended         Education/Evaluation     Row Name 04/23/21 1145          Education    Education  Will Instruct as appropriate        Monitor/Evaluation    Monitor  Per protocol;PO intake;I&O;Pertinent labs;Weight;Skin status           Electronically signed by:  Cydney Durant RD  04/23/21 11:45 EDT

## 2021-04-23 NOTE — PLAN OF CARE
Goal Outcome Evaluation:         Pt's VSS, a/ox4, no c/o pain, x1pivs saline locked, TORRES.  not at the bedside but supportive. New meds today, lisinopril and carvedilol.

## 2021-04-23 NOTE — PROGRESS NOTES
"     [facility] Cardiology IP Progress Note    Sapna Ng  1965  [id number]  04/23/21    Subjective:   Mrs. Sapna Ng is a 55 y.o. female seen in follow-up for coronary artery disease, admitted with an anterior STEMI.  No acute overnight events.  Reports mild chest \"soreness\" this morning which is improving.  No pain or difficulty at the right radial access site.  No other specific complaints.    Review of Systems:   Review of Systems   Constitutional: Negative for chills, diaphoresis, fatigue and fever.   Respiratory: Negative for cough, chest tightness, shortness of breath and wheezing.    Cardiovascular: Negative for chest pain, palpitations and leg swelling.   Gastrointestinal: Negative for abdominal pain and GERD.   Neurological: Negative for dizziness, syncope, weakness and light-headedness.       I have reviewed and/or updated the patient's past medical, past surgical, family history, social history, problem list and allergies as appropriate in the chart.     Physical Exam:  Vital Signs:   Vitals:    04/23/21 0500 04/23/21 0600 04/23/21 0700 04/23/21 0800   BP: 103/67 115/70 117/74 113/65   BP Location:       Patient Position:       Pulse: 69 73 75 72   Resp:   17 16   Temp:   98.1 °F (36.7 °C)    TempSrc:   Oral    SpO2: 98% 97% 97% 95%   Weight:       Height:           Physical Exam  Vitals and nursing note reviewed.   Constitutional:       General: She is not in acute distress.     Appearance: Normal appearance. She is well-developed. She is not diaphoretic.   HENT:      Head: Normocephalic and atraumatic.   Neck:      Trachea: No tracheal deviation.   Cardiovascular:      Rate and Rhythm: Normal rate and regular rhythm.      Heart sounds: Normal heart sounds. No murmur heard.   No friction rub. No gallop.       Comments: Normal JVD.  2+ right radial pulse  Pulmonary:      Effort: Pulmonary effort is normal. No respiratory distress.      Breath sounds: Normal breath sounds. " No stridor. No wheezing or rales.   Chest:      Chest wall: No tenderness.   Abdominal:      General: Bowel sounds are normal. There is no distension.      Palpations: Abdomen is soft.      Tenderness: There is no abdominal tenderness. There is no guarding or rebound.   Musculoskeletal:         General: Normal range of motion.      Comments: No significant edema   Skin:     General: Skin is warm and dry.      Findings: No erythema.      Comments: No ecchymosis at the right radial access site   Neurological:      Mental Status: She is alert and oriented to person, place, and time.   Psychiatric:         Behavior: Behavior normal.         Results Review:   Results from last 7 days   Lab Units 04/23/21  0440 04/22/21  1217   SODIUM mmol/L 137 140   POTASSIUM mmol/L 3.7 3.5   CHLORIDE mmol/L 107 104   CO2 mmol/L 20.8* 25.9   BUN mg/dL 11 11   CREATININE mg/dL 0.76 0.87   CALCIUM mg/dL 8.7 9.1   BILIRUBIN mg/dL  --  0.3   ALK PHOS U/L  --  63   ALT (SGPT) U/L  --  47*   AST (SGOT) U/L  --  30   GLUCOSE mg/dL 152* 114*     Results from last 7 days   Lab Units 04/22/21  1217 04/21/21  1524 04/21/21  1311   TROPONIN T ng/mL <0.010 <0.010 <0.010     Results from last 7 days   Lab Units 04/23/21  0440 04/22/21  1217 04/21/21  1311   WBC 10*3/mm3 15.58* 8.00 7.11   HEMOGLOBIN g/dL 9.7* 11.6* 11.4*   HEMATOCRIT % 28.7* 33.6* 32.8*   PLATELETS 10*3/mm3 293 309 271             Results from last 7 days   Lab Units 04/23/21  0440   CHOLESTEROL mg/dL 147   TRIGLYCERIDES mg/dL 150   HDL CHOL mg/dL 30*   LDL CHOL mg/dL 90       I personally viewed and interpreted the patient's EKG/Telemetry data     Medications:   )  Current Facility-Administered Medications:   •  acetaminophen (TYLENOL) tablet 650 mg, 650 mg, Oral, Q4H PRN, Franklin Baig MD, 650 mg at 04/23/21 0218  •  ALPRAZolam (XANAX) tablet 0.25 mg, 0.25 mg, Oral, TID PRN, Franklin Baig MD  •  aspirin EC tablet 81 mg, 81 mg, Oral, Daily, Franklin Baig MD, 81 mg at  04/23/21 0828  •  atorvastatin (LIPITOR) tablet 80 mg, 80 mg, Oral, Nightly, Franklin Baig MD, 80 mg at 04/22/21 2024  •  carvedilol (COREG) tablet 3.125 mg, 3.125 mg, Oral, BID, Franklin Baig MD, 3.125 mg at 04/23/21 0828  •  cetirizine (zyrTEC) tablet 10 mg, 10 mg, Oral, Nightly, Nancie, Helenar, DO, 10 mg at 04/22/21 2108  •  diphenhydrAMINE (BENADRYL) injection 25 mg, 25 mg, Intravenous, Q6H PRN, Franklin Baig MD  •  HYDROcodone-acetaminophen (NORCO) 5-325 MG per tablet 1 tablet, 1 tablet, Oral, Q4H PRN, Franklin Baig MD  •  montelukast (SINGULAIR) tablet 10 mg, 10 mg, Oral, Nightly, Nancie, Abdiel, DO, 10 mg at 04/22/21 2108  •  Morphine sulfate (PF) injection 4 mg, 4 mg, Intravenous, Q1H PRN **AND** naloxone (NARCAN) injection 0.4 mg, 0.4 mg, Intravenous, Q5 Min PRN, Franklin Baig MD  •  ondansetron (ZOFRAN) tablet 4 mg, 4 mg, Oral, Q6H PRN **OR** ondansetron (ZOFRAN) injection 4 mg, 4 mg, Intravenous, Q6H PRN, Franklin Baig MD  •  sodium chloride 0.9 % flush 10 mL, 10 mL, Intravenous, PRN, Franklin Baig MD  •  temazepam (RESTORIL) capsule 15 mg, 15 mg, Oral, Nightly PRN, Franklin Baig MD  •  ticagrelor (BRILINTA) tablet 90 mg, 90 mg, Oral, Q12H, Franklin Baig MD, 90 mg at 04/23/21 0828    Assessment / Plan:     1.  Severe one-vessel coronary artery disease / Anterior STEMI  --Admitted with anterior STEMI, status post PCI to the mid LAD  --Currently without anginal symptoms  --Integrilin discontinued following 18-hours duration  --Continue DAPT with aspirin and Brilinta uninterrupted for 1 year, then aspirin indefinitely  --Continue high intensity statin  --Continue Coreg, and start low-dose lisinopril  --Transfer to telemetry today, anticipate discharge home tomorrow    2.  Leukocytosis  --WBC elevated at 15  --Suspect likely reactive related to ACS event  --No current symptoms to suggest underlying infectious etiology    3.  Hypertension  --Continue carvedilol  --Start  low-dose lisinopril          LARISA Sawyer MD  Interventional Cardiology   04/23/21  09:34 EDT

## 2021-04-23 NOTE — CASE MANAGEMENT/SOCIAL WORK
Discharge Planning Assessment   [facility]     Patient Name: Sapna Ng  MRN: [id number]  Today's Date: 4/23/2021    Admit Date: 4/22/2021    Discharge Needs Assessment     Row Name 04/23/21 1234       Living Environment    Lives With  spouse    Name(s) of Who Lives With Patient      Unique Family Situation  has poa, living will    Primary Care Provided by  self;spouse/significant other    Provides Primary Care For  no one    Family Caregiver if Needed  spouse       Resource/Environmental Concerns    Transportation Concerns  car, none       Discharge Needs Assessment    Readmission Within the Last 30 Days  no previous admission in last 30 days    Concerns to be Addressed  discharge planning    Provided Post Acute Provider List?  N/A    Discharge Coordination/Progress  home        Discharge Plan     Row Name 04/23/21 1236       Plan    Plan  Discharge planning, verified address and PCP. Lives with , drives, works fulltime, no medication or transport access issues. No current home health, dme or o2. Discussed Brilinta access. Plans to return home at discharge.        Continued Care and Services - Admitted Since 4/22/2021    Coordination has not been started for this encounter.       Expected Discharge Date and Time     Expected Discharge Date Expected Discharge Time    Apr 24, 2021         Demographic Summary     Row Name 04/23/21 1219       General Information    Admission Type  inpatient    Arrived From  home    Expected Length of Stay (LOS)  2    Referral Source  admission list    Reason for Consult  discharge planning    Preferred Language  English       Contact Information    Contact Information Comments          Functional Status     Row Name 04/23/21 1234       Functional Status, IADL    Medications  independent    Meal Preparation  independent    Housekeeping  independent    Laundry  independent    Shopping  independent       Employment/    Employment Status  employed  full-time        Psychosocial    No documentation.       Abuse/Neglect    No documentation.       Legal    No documentation.       Substance Abuse    No documentation.       Patient Forms    No documentation.           Jennie Vasquez LCSW  Continued Stay Note  [facility]     Patient Name: Sapna Ng  MRN: [id number]  Today's Date: 4/23/2021    Admit Date: 4/22/2021    Discharge Plan     Row Name 04/23/21 1236       Plan    Plan  Discharge planning, verified address and PCP. Lives with , drives, works fulltime, no medication or transport access issues. No current home health, dme or o2. Discussed Brilinta access. Plans to return home at discharge.        Discharge Codes    No documentation.       Expected Discharge Date and Time     Expected Discharge Date Expected Discharge Time    Apr 24, 2021             Jennie Vasquez LCSW

## 2021-04-23 NOTE — NURSING NOTE
Referral received for Phase II Cardiac Rehab. Staff reviewed chart and patient has qualifying diagnosis for Phase II Cardiac Rehab.  Met with patient, discussed benefits of exercise, program protocol with ed. material provided.

## 2021-04-23 NOTE — PLAN OF CARE
Goal Outcome Evaluation:  Plan of Care Reviewed With: patient  Progress: improving   Pt resting between care. No acute changes to report. VSS. Transfer from unit. Probable d/c tomorrow. Will continue to monitor.

## 2021-04-24 VITALS
HEIGHT: 60 IN | DIASTOLIC BLOOD PRESSURE: 65 MMHG | RESPIRATION RATE: 18 BRPM | OXYGEN SATURATION: 97 % | SYSTOLIC BLOOD PRESSURE: 106 MMHG | WEIGHT: 171.74 LBS | HEART RATE: 77 BPM | TEMPERATURE: 98.3 F | BODY MASS INDEX: 33.72 KG/M2

## 2021-04-24 PROCEDURE — 99239 HOSP IP/OBS DSCHRG MGMT >30: CPT | Performed by: INTERNAL MEDICINE

## 2021-04-24 RX ORDER — LISINOPRIL 5 MG/1
5 TABLET ORAL
Qty: 90 TABLET | Refills: 3 | Status: SHIPPED | OUTPATIENT
Start: 2021-04-25 | End: 2021-05-06 | Stop reason: ALTCHOICE

## 2021-04-24 RX ORDER — CARVEDILOL 3.12 MG/1
3.12 TABLET ORAL 2 TIMES DAILY
Qty: 180 TABLET | Refills: 3 | Status: SHIPPED | OUTPATIENT
Start: 2021-04-24 | End: 2022-05-24 | Stop reason: SDUPTHER

## 2021-04-24 RX ORDER — ASPIRIN 81 MG/1
81 TABLET ORAL DAILY
Qty: 180 TABLET | Refills: 3 | Status: SHIPPED | OUTPATIENT
Start: 2021-04-25

## 2021-04-24 RX ORDER — ATORVASTATIN CALCIUM 80 MG/1
80 TABLET, FILM COATED ORAL NIGHTLY
Qty: 90 TABLET | Refills: 3 | Status: SHIPPED | OUTPATIENT
Start: 2021-04-24 | End: 2022-03-14

## 2021-04-24 RX ADMIN — SODIUM CHLORIDE, PRESERVATIVE FREE 10 ML: 5 INJECTION INTRAVENOUS at 08:38

## 2021-04-24 RX ADMIN — CARVEDILOL 3.12 MG: 6.25 TABLET, FILM COATED ORAL at 08:38

## 2021-04-24 RX ADMIN — ASPIRIN 81 MG: 81 TABLET, COATED ORAL at 08:38

## 2021-04-24 RX ADMIN — TICAGRELOR 90 MG: 90 TABLET ORAL at 08:38

## 2021-04-24 RX ADMIN — LISINOPRIL 5 MG: 5 TABLET ORAL at 08:38

## 2021-04-24 NOTE — PLAN OF CARE
Goal Outcome Evaluation:  Plan of Care Reviewed With: patient  Progress: improving  Outcome Summary: VSS, rested well, c/o headache, improved with PRN tylenol. No acute events this shift, pt hoping to be d/c'd home today.

## 2021-04-24 NOTE — DISCHARGE SUMMARY
[facility] Cardiology Discharge Summary     Sapna Ng  1965  [id number]    Admission Date: 4/22/2021  Discharge Date: 04/24/21    Primary Discharge Diagnosis:   1.  Anterior STEMI  2.  Severe one-vessel coronary artery disease    Secondary Discharge Diagnosis:   1.  Hypertension  2.  Right lung nodule    Consults:   Consults     Date and Time Order Name Status Description    4/21/2021  3:11 PM Inpatient Cardiology Consult Completed           Day of Discharge Exam:  Subjective: 55-year-old female seen in follow-up for coronary artery disease, admitted with anterior STEMI 4/22.  No acute overnight events.  This morning, denies any recurrent chest pain or exertional anginal symptoms.  Ambulating without difficulty.  No pain or difficulty the right radial access site.  Anticipating discharge home today.    Vital Signs:  Temp:  [97.9 °F (36.6 °C)-98.9 °F (37.2 °C)] 98.3 °F (36.8 °C)  Heart Rate:  [65-91] 77  Resp:  [18-22] 18  BP: ()/(52-65) 106/65    Physical Exam  Vitals and nursing note reviewed.   Constitutional:       General: She is not in acute distress.     Appearance: Normal appearance. She is well-developed. She is not diaphoretic.   HENT:      Head: Normocephalic and atraumatic.   Eyes:      General: No scleral icterus.  Neck:      Trachea: No tracheal deviation.   Cardiovascular:      Rate and Rhythm: Normal rate and regular rhythm.      Heart sounds: Normal heart sounds. No murmur heard.   No friction rub. No gallop.       Comments: Normal JVD.  2+ right radial pulse  Pulmonary:      Effort: Pulmonary effort is normal. No respiratory distress.      Breath sounds: Normal breath sounds. No stridor. No wheezing or rales.   Chest:      Chest wall: No tenderness.   Abdominal:      General: Bowel sounds are normal. There is no distension.      Palpations: Abdomen is soft.      Tenderness: There is no abdominal tenderness. There is no guarding or rebound.   Musculoskeletal:          General: No swelling. Normal range of motion.   Skin:     General: Skin is warm and dry.      Findings: No erythema.   Neurological:      Mental Status: She is alert and oriented to person, place, and time.   Psychiatric:         Behavior: Behavior normal.         Hospital Course:   Ms. Ng is a 55-year-old female who presented to [facility] on 4/22/2021 for evaluation of chest pain.  The patient had recently been seen at [facility] emergency department for evaluation of recurrent episodes of chest pain.  At that time, an ECG was unremarkable and she was discharged home with plans for an outpatient MPS.  The patient subsequent developed recurrent chest pain which did not resolve, and upon presentation to the emergency department she was found to have ST elevation in anterior leads consistent with an anterior STEMI.  She underwent emergent coronary angiography with subsequent PCI and placement of a 3.0 x 28 mm Xience CLARISSA to the mid LAD.  The patient tolerated the procedure well without any periprocedural complications.  A subsequent echocardiogram revealed normal LV systolic function.  She was started on dual antiplatelet therapy with aspirin and Brilinta as well as guideline directed medical therapy with high intensity statin, low-dose carvedilol, and low-dose lisinopril.  She remained hemodynamically stable without significant arrhythmias during her hospitalization, and was subsequent discharged home on 4/24 with plans to follow-up with cardiology clinic in 2 weeks.    Of note, on presentation to the emergency department a chest x-ray was performed which revealed a right midlung nodule of unclear significance.  She does have a prior history of being treated for TB.  Follow-up imaging with a CT scan is recommended, and will be deferred to her primary care provider upon outpatient follow-up.    Procedures Performed  1.  Coronary angiography with PCI 4/22/2021    Pertinent Test  Results:   1.  Echocardiogram 4/22/2021   -Normal left ventricular systolic function, LVEF 55 to 60%   -Grade 1 diastolic dysfunction   -Akinesis of the apex and mid anteroseptal wall    2.  Chest x-ray 4/22/2021   -Nodule opacity in the right midlung field which should be followed up with a CT chest      Condition on Discharge: Stable for discharge    Discharge Disposition  Home or Self Care    Discharge Medications     Discharge Medications      New Medications      Instructions Start Date   atorvastatin 80 MG tablet  Commonly known as: LIPITOR   80 mg, Oral, Nightly      lisinopril 5 MG tablet  Commonly known as: PRINIVIL,ZESTRIL   5 mg, Oral, Every 24 Hours Scheduled   Start Date: April 25, 2021     ticagrelor 90 MG tablet tablet  Commonly known as: BRILINTA   90 mg, Oral, Every 12 Hours Scheduled         Continue These Medications      Instructions Start Date   aspirin 81 MG EC tablet   81 mg, Oral, Daily      carvedilol 3.125 MG tablet  Commonly known as: COREG   3.125 mg, Oral, 2 Times Daily      cetirizine 10 MG tablet  Commonly known as: zyrTEC   10 mg, Oral, Every Night at Bedtime      ferrous sulfate 140 (45 Fe) MG tablet controlled-release tablet   140 mg, Oral, Daily With Breakfast      montelukast 10 MG tablet  Commonly known as: SINGULAIR   10 mg, Oral, Nightly             Discharge Diet: Cardiac diet    Activity at Discharge: Standard right wrist precautions    Follow-up Appointments  Future Appointments   Date Time Provider Department Center   6/22/2021 10:00 AM Jennie Live APRN [facility]         Test Results Pending at Discharge: None       Pedro Luis Sawyer MD  Interventional Cardiology   04/24/21  09:50 EDT    Time: Discharge 55 min    Please note that portions of this note may have been completed with a voice recognition program. Efforts were made to edit the dictations, but occasionally words are mistranscribed.

## 2021-04-24 NOTE — NURSING NOTE
Patient discharged at this time per MD order. Patient given discharge teaching and information about follow up appointments and new prescriptions. Verbalized understanding with no questions/concerns voiced. Patient left facility via wheelchair with  and staff with no acute distress noted.

## 2021-04-25 ENCOUNTER — READMISSION MANAGEMENT (OUTPATIENT)
Dept: CALL CENTER | Facility: HOSPITAL | Age: 56
End: 2021-04-25

## 2021-04-25 NOTE — OUTREACH NOTE
Prep Survey      Responses   [employer] facility patient discharged from?  [facility]   Is LACE score < 7 ?  No   Emergency Room discharge w/ pulse ox?  No   Eligibility  Readm Mgmt   Discharge diagnosis  Anterior STEMI   Does the patient have one of the following disease processes/diagnoses(primary or secondary)?  Acute MI (STEMI,NSTEMI)   Does the patient have Home health ordered?  No   Is there a DME ordered?  No   Medication alerts for this patient  Brilinta    General alerts for this patient  Heart Cath   Prep survey completed?  Yes          Betty Rawls RN

## 2021-04-26 ENCOUNTER — READMISSION MANAGEMENT (OUTPATIENT)
Dept: CALL CENTER | Facility: HOSPITAL | Age: 56
End: 2021-04-26

## 2021-04-26 ENCOUNTER — TELEPHONE (OUTPATIENT)
Dept: CARDIOLOGY | Facility: CLINIC | Age: 56
End: 2021-04-26

## 2021-04-26 NOTE — PAYOR COMM NOTE
"[place] Barros (55 y.o. Female)     Date of Birth Social Security Number Address Home Phone MRN    1965  [id number] [address] [phone] [id number]    Mandaeism Marital Status          Scientologist        Admission Date Admission Type Admitting Provider Attending Provider Department, Room/Bed    4/22/21 Emergency Breeding, Franklin FISHER MD  [facility] MED SURG  4, 409/1    Discharge Date Discharge Disposition Discharge Destination        4/24/2021 Home or Self Care              Attending Provider: (none)   Allergies: Penicillins    Isolation: None   Infection: None   Code Status: Prior    Ht: 152.4 cm (60\")   Wt: 77.9 kg (171 lb 11.8 oz)    Admission Cmt: None   Principal Problem: None                Active Insurance as of 4/22/2021     Primary Coverage     Payor Plan Insurance Group Employer/Plan Group    [employer] [facility] [employer] EMPLOYEE [id number]     Payor Plan Address Payor Plan Phone Number Payor Plan Fax Number Effective Dates    [address] [phone]  4/1/2021 - None Entered    [facility] [id number]       Subscriber Name Subscriber Birth Date Member ID       FLORIDA BARROS 1965 [id number]                 Emergency Contacts      (Rel.) Home Phone Work Phone Mobile Phone    Luis Alfredo Barros (Spouse) [phone] -- [phone]    Jaquelin Tanner (Sister) [phone] -- [phone]               Discharge Summary      Lenard Sawyer MD at 04/24/21 0950               [facility] Cardiology Discharge Summary     Florida Barros  1965  [id number]    Admission Date: 4/22/2021  Discharge Date: 04/24/21    Primary Discharge Diagnosis:   1.  Anterior STEMI  2.  Severe one-vessel coronary artery disease    Secondary Discharge Diagnosis:   1.  Hypertension  2.  Right lung nodule    Consults:   Consults     Date and Time Order Name Status Description    4/21/2021  3:11 PM Inpatient Cardiology Consult Completed  "          Day of Discharge Exam:  Subjective: 55-year-old female seen in follow-up for coronary artery disease, admitted with anterior STEMI 4/22.  No acute overnight events.  This morning, denies any recurrent chest pain or exertional anginal symptoms.  Ambulating without difficulty.  No pain or difficulty the right radial access site.  Anticipating discharge home today.    Vital Signs:  Temp:  [97.9 °F (36.6 °C)-98.9 °F (37.2 °C)] 98.3 °F (36.8 °C)  Heart Rate:  [65-91] 77  Resp:  [18-22] 18  BP: ()/(52-65) 106/65    Physical Exam  Vitals and nursing note reviewed.   Constitutional:       General: She is not in acute distress.     Appearance: Normal appearance. She is well-developed. She is not diaphoretic.   HENT:      Head: Normocephalic and atraumatic.   Eyes:      General: No scleral icterus.  Neck:      Trachea: No tracheal deviation.   Cardiovascular:      Rate and Rhythm: Normal rate and regular rhythm.      Heart sounds: Normal heart sounds. No murmur heard.   No friction rub. No gallop.       Comments: Normal JVD.  2+ right radial pulse  Pulmonary:      Effort: Pulmonary effort is normal. No respiratory distress.      Breath sounds: Normal breath sounds. No stridor. No wheezing or rales.   Chest:      Chest wall: No tenderness.   Abdominal:      General: Bowel sounds are normal. There is no distension.      Palpations: Abdomen is soft.      Tenderness: There is no abdominal tenderness. There is no guarding or rebound.   Musculoskeletal:         General: No swelling. Normal range of motion.   Skin:     General: Skin is warm and dry.      Findings: No erythema.   Neurological:      Mental Status: She is alert and oriented to person, place, and time.   Psychiatric:         Behavior: Behavior normal.         Hospital Course:   Ms. Ng is a 55-year-old female who presented to [facility] on 4/22/2021 for evaluation of chest pain.  The patient had recently been seen at [facility] emergency department for evaluation of recurrent episodes of chest pain.  At that time, an ECG was unremarkable and she was discharged home with plans for an outpatient MPS.  The patient subsequent developed recurrent chest pain which did not resolve, and upon presentation to the emergency department she was found to have ST elevation in anterior leads consistent with an anterior STEMI.  She underwent emergent coronary angiography with subsequent PCI and placement of a 3.0 x 28 mm Xience CLARISSA to the mid LAD.  The patient tolerated the procedure well without any periprocedural complications.  A subsequent echocardiogram revealed normal LV systolic function.  She was started on dual antiplatelet therapy with aspirin and Brilinta as well as guideline directed medical therapy with high intensity statin, low-dose carvedilol, and low-dose lisinopril.  She remained hemodynamically stable without significant arrhythmias during her hospitalization, and was subsequent discharged home on 4/24 with plans to follow-up with cardiology clinic in 2 weeks.    Of note, on presentation to the emergency department a chest x-ray was performed which revealed a right midlung nodule of unclear significance.  She does have a prior history of being treated for TB.  Follow-up imaging with a CT scan is recommended, and will be deferred to her primary care provider upon outpatient follow-up.    Procedures Performed  1.  Coronary angiography with PCI 4/22/2021    Pertinent Test Results:   1.  Echocardiogram 4/22/2021   -Normal left ventricular systolic function, LVEF 55 to 60%   -Grade 1 diastolic dysfunction   -Akinesis of the apex and mid anteroseptal wall    2.  Chest x-ray 4/22/2021   -Nodule opacity in the right midlung field which should be followed up with a CT chest      Condition on Discharge: Stable for discharge    Discharge Disposition  Home or Self Care    Discharge Medications     Discharge Medications      New Medications       Instructions Start Date   atorvastatin 80 MG tablet  Commonly known as: LIPITOR   80 mg, Oral, Nightly      lisinopril 5 MG tablet  Commonly known as: PRINIVIL,ZESTRIL   5 mg, Oral, Every 24 Hours Scheduled   Start Date: April 25, 2021     ticagrelor 90 MG tablet tablet  Commonly known as: BRILINTA   90 mg, Oral, Every 12 Hours Scheduled         Continue These Medications      Instructions Start Date   aspirin 81 MG EC tablet   81 mg, Oral, Daily      carvedilol 3.125 MG tablet  Commonly known as: COREG   3.125 mg, Oral, 2 Times Daily      cetirizine 10 MG tablet  Commonly known as: zyrTEC   10 mg, Oral, Every Night at Bedtime      ferrous sulfate 140 (45 Fe) MG tablet controlled-release tablet   140 mg, Oral, Daily With Breakfast      montelukast 10 MG tablet  Commonly known as: SINGULAIR   10 mg, Oral, Nightly             Discharge Diet: Cardiac diet    Activity at Discharge: Standard right wrist precautions    Follow-up Appointments  Future Appointments   Date Time Provider Department Center   6/22/2021 10:00 AM Jennie Live, [facility]         Test Results Pending at Discharge: None       Pedro Luis Sawyer MD  Interventional Cardiology   04/24/21  09:50 EDT    Time: Discharge 55 min    Please note that portions of this note may have been completed with a voice recognition program. Efforts were made to edit the dictations, but occasionally words are mistranscribed.         Electronically signed by Lenard Sawyer MD at 04/24/21 0907

## 2021-04-26 NOTE — OUTREACH NOTE
AMI Week 1 Survey      Responses   [facility]?  [facility]   Does the patient have one of the following disease processes/diagnoses(primary or secondary)?  Acute MI (STEMI,NSTEMI)   Week 1 attempt successful?  Yes   Call start time  1222   Call end time  1243   General alerts for this patient  Heart Cath   Discharge diagnosis  Anterior STEMI   Is patient permission given to speak with other caregiver?  Yes   List who call center can speak with     Meds reviewed with patient/caregiver?  Yes   Is the patient having any side effects they believe may be caused by any medication additions or changes?  Yes   Side effects comments   coughing that is dry since starting lisinopril and diarrhea since starting lipitor.    Does the patient have all prescriptions related to this admission filled (includes statins,anticoagulants,HTN meds,anti-arrhythmia meds)  Yes   Is the patient taking all medications as directed (includes completed medication regime)?  Yes   Does the patient have a primary care provider?   Yes   Does the patient have an appointment with their PCP,cardiologist,or clinic within 7 days of discharge?  Yes   Comments regarding PCP  Cardiology would not give her an appt until June. She is very unhappy about this and will call them back.    Has the patient kept scheduled appointments due by today?  N/A   Psychosocial issues?  No   Comments  [facility] site- right radial no issues, healing well. Edema in arm/hand is resolved. Denies cardiac symptoms. She will begin monitoring her BP at home.    Did the patient receive a copy of their discharge instructions?  Yes   Nursing interventions  Reviewed instructions with patient   What is the patient's perception of their health status since discharge?  New symptoms unrelated to diagnosis [side effects of meds]   Nursing interventions  Nurse provided patient education   Is the patient/caregiver able to teach back signs and symptoms of when to call  for help immediately:  Sudden chest discomfort, Sudden discomfort in arms, back, neck or jaw, Shortness of breath at any time   Is the patient/caregiver able to teach back lifestyle changes to help prevent MIs  Regular exercise as approved by provider, Heart healthy diet   Is the patient/caregiver able to teach back ways to prevent a second heart attack:  Take medications, Follow up with MD   If the patient is a current smoker, are they able to teach back resources for cessation?  Not a smoker   Is the patient/caregiver able to teach back the hierarchy of who to call/visit for symptoms/problems? PCP, Specialist, Home health nurse, Urgent Care, ED, 911  Yes   Week 1 call completed?  Yes   Wrap up additional comments  Pt is unsure when she can return to work, advised her to call MD as there is no info on AVS          Trini Macias, RN

## 2021-04-27 ENCOUNTER — TELEPHONE (OUTPATIENT)
Dept: CARDIOLOGY | Facility: CLINIC | Age: 56
End: 2021-04-27

## 2021-04-27 NOTE — TELEPHONE ENCOUNTER
Patient needs to have allergy injections and her provider told her that she either needs to stop or change coreg.Advise

## 2021-04-30 NOTE — TELEPHONE ENCOUNTER
Patient notified. Patient wanted to know if she could hold Coreg for 36 hours before he allergy injection.

## 2021-05-02 ENCOUNTER — NURSE TRIAGE (OUTPATIENT)
Dept: CALL CENTER | Facility: HOSPITAL | Age: 56
End: 2021-05-02

## 2021-05-03 NOTE — TELEPHONE ENCOUNTER
States she had a MI 4/22/2021. States one of the medications they started her on is making her cough. States she is coughing and coughing.     States she took her medication at 2115 and vomited at 2200. Asking if she should retake her nighttime meds? Did not see pills in vomit.     Instructed to not retake medications, just take in AM as normal. Also suggested talking to her PCP about lisinopril as it is a known side effect (cough).     Reason for Disposition  • [1] Caller has NON-URGENT medication question about med that PCP prescribed AND [2] triager unable to answer question    Additional Information  • Negative: Drug overdose and triager unable to answer question  • Negative: Caller requesting information unrelated to medicine  • Negative: Caller requesting a prescription for Strep throat and has a positive culture result  • Negative: Rash while taking a medication or within 3 days of stopping it  • Negative: Immunization reaction suspected  • Negative: [1] Asthma and [2] having symptoms of asthma (cough, wheezing, etc.)  • Negative: [1] Influenza symptoms AND [2] anti-viral med prescription request, such as Tamiflu  • Negative: [1] Symptom of illness (e.g., headache, abdominal pain, earache, vomiting) AND [2] more than mild  • Negative: MORE THAN A DOUBLE DOSE of a prescription or over-the-counter (OTC) drug  • Negative: [1] DOUBLE DOSE (an extra dose or lesser amount) of over-the-counter (OTC) drug AND [2] any symptoms (e.g., dizziness, nausea, pain, sleepiness)  • Negative: [1] DOUBLE DOSE (an extra dose or lesser amount) of prescription drug AND [2] any symptoms (e.g., dizziness, nausea, pain, sleepiness)  • Negative: Took another person's prescription drug  • Negative: [1] DOUBLE DOSE (an extra dose or lesser amount) of prescription drug AND [2] NO symptoms (Exception: a double dose of antibiotics)  • Negative: Diabetes drug error or overdose (e.g., took wrong type of insulin or took extra dose)  •  "Negative: [1] Request for URGENT new prescription or refill of \"essential\" medication (i.e., likelihood of harm to patient if not taken) AND [2] triager unable to fill per unit policy  • Negative: [1] Prescription not at pharmacy AND [2] was prescribed by PCP recently  • Negative: [1] Pharmacy calling with prescription questions AND [2] triager unable to answer question  • Negative: [1] Caller has URGENT medication question about med that PCP or specialist prescribed AND [2] triager unable to answer question  • Negative: [1] Caller requesting a NON-URGENT new prescription or refill AND [2] triager unable to refill per unit policy  • Negative: [1] Caller has medication question about med not prescribed by PCP AND [2] triager unable to answer question (e.g., compatibility with other med, storage)  • Negative: Caller requesting a CONTROLLED substance prescription refill (e.g., narcotics, ADHD medicines)    Answer Assessment - Initial Assessment Questions  1.   NAME of MEDICATION: \"What medicine are you calling about?\"      see note  2.   QUESTION: \"What is your question?\"      n/a  3.   PRESCRIBING HCP: \"Who prescribed it?\" Reason: if prescribed by specialist, call should be referred to that group.      n/a  4. SYMPTOMS: \"Do you have any symptoms?\"      n/a  5. SEVERITY: If symptoms are present, ask \"Are they mild, moderate or severe?\"      n/a  6.  PREGNANCY:  \"Is there any chance that you are pregnant?\" \"When was your last menstrual period?\"      n/a    Protocols used: MEDICATION QUESTION CALL-ADULT-      "

## 2021-05-04 ENCOUNTER — TELEPHONE (OUTPATIENT)
Dept: CARDIOLOGY | Facility: CLINIC | Age: 56
End: 2021-05-04

## 2021-05-04 NOTE — TELEPHONE ENCOUNTER
Patient c/o a lot of coughing. Wanted to know of some of her medications need to be changed. Patient has an appointment on Thursday.Advise.

## 2021-05-05 ENCOUNTER — READMISSION MANAGEMENT (OUTPATIENT)
Dept: CALL CENTER | Facility: HOSPITAL | Age: 56
End: 2021-05-05

## 2021-05-05 NOTE — OUTREACH NOTE
AMI Week 2 Survey      Responses   [facility] patient discharged from?  [facility]   Does the patient have one of the following disease processes/diagnoses(primary or secondary)?  Acute MI (STEMI,NSTEMI)   Week 2 attempt successful?  Yes   Call start time  1621   Call end time  1630   Discharge diagnosis  Anterior STEMI   Is patient permission given to speak with other caregiver?  Yes   List who call center can speak with     Person spoke with today (if not patient) and relationship     Meds reviewed with patient/caregiver?  Yes   Is the patient having any side effects they believe may be caused by any medication additions or changes?  Yes   Side effects comments    reports patient has continued to have cough after starting lisinopril and also sick stomach and sometimes throws up.    Does the patient have all prescriptions related to this admission filled (includes statins,anticoagulants,HTN meds,anti-arrhythmia meds)  Yes   Is the patient taking all medications as directed (includes completed medication regime)?  Yes   Comments regarding appointments  Patient has cardiology appt tomorrow.    Does the patient have a primary care provider?   Yes   Has the patient kept scheduled appointments due by today?  Yes   Has home health visited the patient within 72 hours of discharge?  N/A   Psychosocial issues?  No   Did the patient receive a copy of their discharge instructions?  Yes   Nursing interventions  Reviewed instructions with patient []   What is the patient's perception of their health status since discharge?  Improving [ states patient not having any chest pain or heart issues, but continues to have cough, nausea. ]   Nursing interventions  Nurse provided patient education, Advised patient to call provider [ states that they are seeing cardiology tomorrow and will address. ]   Is the patient/caregiver able to teach back signs and symptoms of when to call for help  immediately:  Sudden chest discomfort, Sudden discomfort in arms, back, neck or jaw, Shortness of breath at any time, Sudden sweating or clammy skin, Nausea or vomiting, Dizziness or lightheadedness, Irregular or rapid heart rate   Nursing interventions  Nurse provided patient education   Is the patient/caregiver able to teach back lifestyle changes to help prevent MIs  Regular exercise as approved by provider, Heart healthy diet   Is the patient/caregiver able to teach back ways to prevent a second heart attack:  Take medications, Follow up with MD, Manage risk factors   If the patient is a current smoker, are they able to teach back resources for cessation?  Not a smoker   Is the patient/caregiver able to teach back the hierarchy of who to call/visit for symptoms/problems? PCP, Specialist, Home health nurse, Urgent Care, ED, 911  Yes   Week 2 call completed?  Yes          Era Weston RN

## 2021-05-06 ENCOUNTER — OFFICE VISIT (OUTPATIENT)
Dept: CARDIOLOGY | Facility: CLINIC | Age: 56
End: 2021-05-06

## 2021-05-06 VITALS
WEIGHT: 171 LBS | RESPIRATION RATE: 18 BRPM | DIASTOLIC BLOOD PRESSURE: 64 MMHG | BODY MASS INDEX: 33.57 KG/M2 | OXYGEN SATURATION: 99 % | SYSTOLIC BLOOD PRESSURE: 104 MMHG | HEART RATE: 84 BPM | HEIGHT: 60 IN

## 2021-05-06 DIAGNOSIS — I10 ESSENTIAL HYPERTENSION: ICD-10-CM

## 2021-05-06 DIAGNOSIS — I25.10 CORONARY ARTERY DISEASE INVOLVING NATIVE CORONARY ARTERY OF NATIVE HEART WITHOUT ANGINA PECTORIS: Primary | ICD-10-CM

## 2021-05-06 PROCEDURE — 99214 OFFICE O/P EST MOD 30 MIN: CPT | Performed by: INTERNAL MEDICINE

## 2021-05-06 RX ORDER — LORATADINE 10 MG/1
10 TABLET ORAL DAILY
COMMUNITY
End: 2021-06-22

## 2021-05-06 RX ORDER — LOSARTAN POTASSIUM 25 MG/1
25 TABLET ORAL DAILY
Qty: 90 TABLET | Refills: 3 | Status: SHIPPED | OUTPATIENT
Start: 2021-05-06 | End: 2022-05-12

## 2021-05-06 NOTE — PROGRESS NOTES
[facility] Cardiology Office Follow Up Note    Sapna Ng  [id number]  2022    Primary Care Provider: Letty Diaz MD    Chief Complaint: Hospital follow-up    History of Present Illness:   Mrs. Sapna Ng is a 55 y.o. female who presents to the Cardiology Clinic for hospital follow-up.  The patient has a past medical history significant for hypertension.  She has a past cardiac history significant for severe one-vessel coronary artery disease presenting with an anterior STEMI in .  She underwent PCI to the mid LAD with placement of a 3.0 x 28 mm Xience CLARISSA at that time.  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  A post MI echocardiogram at that time showed preserved LV systolic function, with akinesis of the apex and anteroseptal wall.  She presents today for hospital follow-up.  Since discharge, the patient reports she has done well without chest pain or exertional anginal symptoms.  No significant exertional dyspnea.  No apnea, PND, or lower extremity swelling.  She is currently tolerating DAPT with aspirin and Brilinta without difficulty.  She does report a cough, following initiation of low-dose lisinopril.  No other specific complaints at this time.    Past Cardiac Testin. Last Coronary Angio: 2021   1.  Thrombotic occlusion of the mid LAD is culprit for anterior STEMI    -Successful PCI with placement of a 3.0 x 28 mm Xience CLARISSA   2.  Mild luminal irregularities in the RCA and LCx   3.  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 Prior Stress Testing: None  3. Last Echo: 2021   1.  LVEF 55-60%   2.  Akinesis of the apex and anteroseptal wall   3.  Grade 1 diastolic dysfunction  4. Prior Holter Monitor: None    Review of Systems:   Review of Systems   Constitutional: Negative for activity change, appetite change, chills, diaphoresis, fatigue, fever, unexpected  weight gain and unexpected weight loss.   Eyes: Negative for blurred vision and double vision.   Respiratory: Positive for cough. Negative for chest tightness, shortness of breath and wheezing.    Cardiovascular: Negative for chest pain, palpitations and leg swelling.   Gastrointestinal: Negative for abdominal pain, anal bleeding, blood in stool and GERD.   Endocrine: Negative for cold intolerance and heat intolerance.   Genitourinary: Negative for hematuria.   Neurological: Negative for dizziness, syncope, weakness and light-headedness.   Hematological: Does not bruise/bleed easily.   Psychiatric/Behavioral: Negative for depressed mood and stress. The patient is not nervous/anxious.        I have reviewed and/or updated the patient's past medical, past surgical, family, social history, problem list and allergies as appropriate.     Medications:     Current Outpatient Medications:   •  aspirin 81 MG EC tablet, Take 1 tablet by mouth Daily., Disp: 180 tablet, Rfl: 3  •  atorvastatin (LIPITOR) 80 MG tablet, Take 1 tablet by mouth Every Night., Disp: 90 tablet, Rfl: 3  •  carvedilol (COREG) 3.125 MG tablet, Take 1 tablet by mouth 2 (Two) Times a Day., Disp: 180 tablet, Rfl: 3  •  cetirizine (zyrTEC) 10 MG tablet, Take 10 mg by mouth every night at bedtime., Disp: , Rfl:   •  ferrous sulfate 140 (45 Fe) MG tablet controlled-release tablet, Take 140 mg by mouth Daily With Breakfast., Disp: , Rfl:   •  loratadine (Claritin) 10 MG tablet, Take 10 mg by mouth Daily., Disp: , Rfl:   •  montelukast (SINGULAIR) 10 MG tablet, Take 10 mg by mouth Every Night., Disp: , Rfl:   •  ticagrelor (BRILINTA) 90 MG tablet tablet, Take 1 tablet by mouth Every 12 (Twelve) Hours., Disp: 180 tablet, Rfl: 3  •  losartan (Cozaar) 25 MG tablet, Take 1 tablet by mouth Daily., Disp: 90 tablet, Rfl: 3    Physical Exam:  Vital Signs:   Vitals:    05/06/21 1109   BP: 104/64   BP Location: Right arm   Pulse: 84   Resp: 18   SpO2: 99%   Weight: 77.6 kg  "(171 lb)   Height: 152.4 cm (60\")       Physical Exam  Constitutional:       General: She is not in acute distress.     Appearance: Normal appearance. She is well-developed. She is not diaphoretic.   HENT:      Head: Normocephalic and atraumatic.   Eyes:      General: No scleral icterus.     Pupils: Pupils are equal, round, and reactive to light.   Neck:      Trachea: No tracheal deviation.   Cardiovascular:      Rate and Rhythm: Normal rate and regular rhythm.      Heart sounds: Normal heart sounds. No murmur heard.   No friction rub. No gallop.       Comments: Normal JVD.  2+ right radial pulse  Pulmonary:      Effort: Pulmonary effort is normal. No respiratory distress.      Breath sounds: Normal breath sounds. No stridor. No wheezing or rales.   Chest:      Chest wall: No tenderness.   Abdominal:      General: Bowel sounds are normal. There is no distension.      Palpations: Abdomen is soft.      Tenderness: There is no abdominal tenderness. There is no guarding or rebound.   Musculoskeletal:         General: No swelling. Normal range of motion.      Cervical back: Neck supple. No tenderness.   Lymphadenopathy:      Cervical: No cervical adenopathy.   Skin:     General: Skin is warm and dry.      Findings: No erythema.   Neurological:      General: No focal deficit present.      Mental Status: She is alert and oriented to person, place, and time.   Psychiatric:         Mood and Affect: Mood normal.         Behavior: Behavior normal.         Results Review:   I reviewed the patient's new clinical results.      Assessment / Plan:     1.   One-vessel coronary artery disease  --Initially presented with anterior STEMI in 4/21, underwent PCI to the mid LAD, mild nonobstructive residual disease in the LAD and LCx  --Currently doing well without chest pain or exertional anginal symptoms  --Continue DAPT with aspirin and Brilinta uninterrupted for 1 year, then aspirin monotherapy indefinitely  --Continue high intensity " statin  --Continue metoprolol, change lisinopril to losartan given cough  --Plans to participate in cardiac rehab  --Follow-up in 3 months, or sooner if required     2.  Hypertension  --Currently well controlled  --Will change lisinopril to losartan given cough      Follow Up:   Return in about 3 months (around 8/6/2021).      Thank you for allowing me to participate in the care of your patient. Please to not hesitate to contact me with additional questions or concerns.     LARISA Sawyer MD  Interventional Cardiology   05/06/2021  11:08 EDT

## 2021-05-07 PROBLEM — I25.10 CORONARY ARTERY DISEASE INVOLVING NATIVE CORONARY ARTERY OF NATIVE HEART WITHOUT ANGINA PECTORIS: Status: ACTIVE | Noted: 2021-04-22

## 2021-05-07 PROBLEM — I21.3 ST ELEVATION MYOCARDIAL INFARCTION (STEMI): Status: RESOLVED | Noted: 2021-04-22 | Resolved: 2021-05-07

## 2021-05-11 ENCOUNTER — READMISSION MANAGEMENT (OUTPATIENT)
Dept: CALL CENTER | Facility: HOSPITAL | Age: 56
End: 2021-05-11

## 2021-05-11 NOTE — OUTREACH NOTE
AMI Week 3 Survey      Responses   [facility] [place] patient discharged from?  [facility]   Does the patient have one of the following disease processes/diagnoses(primary or secondary)?  Acute MI (STEMI,NSTEMI)   Week 3 attempt successful?  No   Unsuccessful attempts  Attempt 1          Isaura Cho RN

## 2021-05-14 ENCOUNTER — READMISSION MANAGEMENT (OUTPATIENT)
Dept: CALL CENTER | Facility: HOSPITAL | Age: 56
End: 2021-05-14

## 2021-05-14 NOTE — OUTREACH NOTE
AMI Week 3 Survey      Responses   [facility] patient discharged from?  [facility]   Does the patient have one of the following disease processes/diagnoses(primary or secondary)?  Acute MI (STEMI,NSTEMI)   Week 3 attempt successful?  No   Unsuccessful attempts  Attempt 2          Ashlyn Aguirre LPN

## 2021-05-18 ENCOUNTER — TREATMENT (OUTPATIENT)
Dept: CARDIAC REHAB | Facility: HOSPITAL | Age: 56
End: 2021-05-18

## 2021-05-18 DIAGNOSIS — Z95.5 S/P PRIMARY ANGIOPLASTY WITH CORONARY STENT: Primary | ICD-10-CM

## 2021-05-18 PROCEDURE — 93797 PHYS/QHP OP CAR RHAB WO ECG: CPT

## 2021-05-18 PROCEDURE — 93798 PHYS/QHP OP CAR RHAB W/ECG: CPT

## 2021-05-18 NOTE — PROGRESS NOTES
Pt was seen today in CR for a Phase 2 visit.  Vital signs and session notes recorded in [employer] and will be scanned into Epic by HIM.

## 2021-05-26 ENCOUNTER — TREATMENT (OUTPATIENT)
Dept: CARDIAC REHAB | Facility: HOSPITAL | Age: 56
End: 2021-05-26

## 2021-05-26 DIAGNOSIS — Z95.5 S/P PRIMARY ANGIOPLASTY WITH CORONARY STENT: Primary | ICD-10-CM

## 2021-05-26 PROCEDURE — 93798 PHYS/QHP OP CAR RHAB W/ECG: CPT

## 2021-05-26 NOTE — PROGRESS NOTES
Pt was seen today in CR for a Phase 2 visit.  Vital signs and session notes recorded in [employer] and will be scanned into Epic by HIM.

## 2021-06-02 ENCOUNTER — TREATMENT (OUTPATIENT)
Dept: CARDIAC REHAB | Facility: HOSPITAL | Age: 56
End: 2021-06-02

## 2021-06-02 DIAGNOSIS — Z95.5 S/P PRIMARY ANGIOPLASTY WITH CORONARY STENT: Primary | ICD-10-CM

## 2021-06-02 PROCEDURE — 93798 PHYS/QHP OP CAR RHAB W/ECG: CPT

## 2021-06-04 ENCOUNTER — TREATMENT (OUTPATIENT)
Dept: CARDIAC REHAB | Facility: HOSPITAL | Age: 56
End: 2021-06-04

## 2021-06-04 DIAGNOSIS — Z95.5 S/P PRIMARY ANGIOPLASTY WITH CORONARY STENT: Primary | ICD-10-CM

## 2021-06-04 PROCEDURE — 93798 PHYS/QHP OP CAR RHAB W/ECG: CPT

## 2021-06-04 NOTE — PROGRESS NOTES
Pt was seen today in CR for a Phase 2 visit.  Vital signs and session notes recorded in [employer] and will be scanned into Epic by HIM.

## 2021-06-07 ENCOUNTER — TREATMENT (OUTPATIENT)
Dept: CARDIAC REHAB | Facility: HOSPITAL | Age: 56
End: 2021-06-07

## 2021-06-07 DIAGNOSIS — Z95.5 S/P PRIMARY ANGIOPLASTY WITH CORONARY STENT: Primary | ICD-10-CM

## 2021-06-07 PROCEDURE — 93798 PHYS/QHP OP CAR RHAB W/ECG: CPT

## 2021-06-07 NOTE — PROGRESS NOTES
Pt was seen today in CR for a Phase 2 visit.  Vital signs and session notes recorded in [employer] and will be scanned into Epic by HIM.

## 2021-06-09 ENCOUNTER — TREATMENT (OUTPATIENT)
Dept: CARDIAC REHAB | Facility: HOSPITAL | Age: 56
End: 2021-06-09

## 2021-06-09 DIAGNOSIS — Z95.5 S/P PRIMARY ANGIOPLASTY WITH CORONARY STENT: Primary | ICD-10-CM

## 2021-06-09 PROCEDURE — 93798 PHYS/QHP OP CAR RHAB W/ECG: CPT

## 2021-06-09 NOTE — PROGRESS NOTES
Pt was seen today in CR for a Phase 2 visit.  Vital signs and session notes recorded in [employer] and will be scanned into Epic by HIM.

## 2021-06-18 ENCOUNTER — TREATMENT (OUTPATIENT)
Dept: CARDIAC REHAB | Facility: HOSPITAL | Age: 56
End: 2021-06-18

## 2021-06-18 DIAGNOSIS — Z95.5 S/P PRIMARY ANGIOPLASTY WITH CORONARY STENT: Primary | ICD-10-CM

## 2021-06-18 PROCEDURE — 93798 PHYS/QHP OP CAR RHAB W/ECG: CPT

## 2021-06-18 NOTE — PROGRESS NOTES
Pt was seen today in CR for a Phase 2 visit.  Vital signs and session notes recorded in [employer] and will be scanned into Epic by HIM.

## 2021-06-21 ENCOUNTER — TREATMENT (OUTPATIENT)
Dept: CARDIAC REHAB | Facility: HOSPITAL | Age: 56
End: 2021-06-21

## 2021-06-21 DIAGNOSIS — Z95.5 S/P PRIMARY ANGIOPLASTY WITH CORONARY STENT: Primary | ICD-10-CM

## 2021-06-21 PROCEDURE — 93798 PHYS/QHP OP CAR RHAB W/ECG: CPT

## 2021-06-21 NOTE — PROGRESS NOTES
Pt was seen today in CR for a Phase 2 visit.  Vital signs and session notes recorded in [employer] and will be scanned into Epic by HIM.

## 2021-06-22 ENCOUNTER — TELEMEDICINE (OUTPATIENT)
Dept: SLEEP MEDICINE | Facility: HOSPITAL | Age: 56
End: 2021-06-22

## 2021-06-22 VITALS — BODY MASS INDEX: 32.2 KG/M2 | WEIGHT: 164 LBS | HEIGHT: 60 IN

## 2021-06-22 DIAGNOSIS — G47.33 OSA (OBSTRUCTIVE SLEEP APNEA): Primary | ICD-10-CM

## 2021-06-22 PROCEDURE — 99213 OFFICE O/P EST LOW 20 MIN: CPT | Performed by: NURSE PRACTITIONER

## 2021-06-22 NOTE — PROGRESS NOTES
Chief Complaint:   Chief Complaint   Patient presents with   • Follow-up       HPI:    Sapna Ng is a 55 y.o. female here for follow-up of sleep apnea.  Patient was last seen 6/22/2020.  Patient does sleep 7+ hours nightly and feels rested upon awakening.  Patient will go to sleep within 5 minutes and does not get up during the night patient has an [place] score of 4/24.  Patient has no concerns or complaints regarding her CPAP therapy and wishes to continue.    Past Medical History:   Diagnosis Date   • Myocardial infarction (CMS/HCC)    • PPD positive, treated          Current medications are:   Current Outpatient Medications:   •  aspirin 81 MG EC tablet, Take 1 tablet by mouth Daily., Disp: 180 tablet, Rfl: 3  •  atorvastatin (LIPITOR) 80 MG tablet, Take 1 tablet by mouth Every Night., Disp: 90 tablet, Rfl: 3  •  carvedilol (COREG) 3.125 MG tablet, Take 1 tablet by mouth 2 (Two) Times a Day., Disp: 180 tablet, Rfl: 3  •  cetirizine (zyrTEC) 10 MG tablet, Take 10 mg by mouth every night at bedtime., Disp: , Rfl:   •  ferrous sulfate 140 (45 Fe) MG tablet controlled-release tablet, Take 140 mg by mouth Daily With Breakfast., Disp: , Rfl:   •  losartan (Cozaar) 25 MG tablet, Take 1 tablet by mouth Daily., Disp: 90 tablet, Rfl: 3  •  montelukast (SINGULAIR) 10 MG tablet, Take 10 mg by mouth Every Night., Disp: , Rfl:   •  ticagrelor (BRILINTA) 90 MG tablet tablet, Take 1 tablet by mouth Every 12 (Twelve) Hours., Disp: 180 tablet, Rfl: 3.      The patient's relevant past medical, surgical, family and social history were reviewed and updated in Epic as appropriate.       Review of Systems   Eyes: Positive for visual disturbance.   Respiratory: Positive for apnea.    Allergic/Immunologic: Positive for environmental allergies.   Psychiatric/Behavioral: Positive for sleep disturbance.   All other systems reviewed and are negative.        Objective:    Physical Exam  Constitutional:       Appearance: Normal  appearance.   HENT:      Head: Normocephalic and atraumatic.      Mouth/Throat:      Comments: Class 4 airway  Pulmonary:      Effort: Pulmonary effort is normal. No respiratory distress.   Skin:     General: Skin is dry.      Coloration: Skin is not jaundiced or pale.   Neurological:      Mental Status: She is alert and oriented to person, place, and time.   Psychiatric:         Mood and Affect: Mood normal.         Behavior: Behavior normal.         Thought Content: Thought content normal.         Judgment: Judgment normal.     90/90 days of use  Greater than 4-hour use 97.8  90% pressure 9.5  AHI of 0.8  Settings 8-20      ASSESSMENT/PLAN    Diagnoses and all orders for this visit:    1. DORITA (obstructive sleep apnea) (Primary)            1. Counseled patient regarding multimodal approach with healthy nutrition, healthy sleep, regular physical activity, social activities, counseling, and medications. Encouraged to practice lateral sleep position. Avoid alcohol and sedatives close to bedtime.  2.   Refill supplies x1 year.  Return to clinic 1 year or sooner if symptoms warrant.  I have reviewed the results of my evaluation and impression and discussed my recommendations in detail with the patient.      Signed by  WESTLEY Wong    June 22, 2021      CC: Letty Diaz MD          No ref. provider found

## 2021-06-23 ENCOUNTER — TREATMENT (OUTPATIENT)
Dept: CARDIAC REHAB | Facility: HOSPITAL | Age: 56
End: 2021-06-23

## 2021-06-23 DIAGNOSIS — Z95.5 S/P PRIMARY ANGIOPLASTY WITH CORONARY STENT: Primary | ICD-10-CM

## 2021-06-23 PROCEDURE — 93798 PHYS/QHP OP CAR RHAB W/ECG: CPT

## 2021-06-23 NOTE — PROGRESS NOTES
Pt was seen today in CR for a Phase 2 visit.  Vital signs and session notes recorded in [employer] and will be scanned into Epic by HIM.

## 2021-06-25 ENCOUNTER — TREATMENT (OUTPATIENT)
Dept: CARDIAC REHAB | Facility: HOSPITAL | Age: 56
End: 2021-06-25

## 2021-06-25 DIAGNOSIS — Z95.5 S/P PRIMARY ANGIOPLASTY WITH CORONARY STENT: Primary | ICD-10-CM

## 2021-06-25 PROCEDURE — 93798 PHYS/QHP OP CAR RHAB W/ECG: CPT

## 2021-06-25 NOTE — PROGRESS NOTES
Pt was seen today in CR for a Phase 2 visit.  Vital signs and session notes recorded in [employer] and will be scanned into Epic by HIM.

## 2021-06-28 ENCOUNTER — TREATMENT (OUTPATIENT)
Dept: CARDIAC REHAB | Facility: HOSPITAL | Age: 56
End: 2021-06-28

## 2021-06-28 DIAGNOSIS — Z95.5 S/P PRIMARY ANGIOPLASTY WITH CORONARY STENT: Primary | ICD-10-CM

## 2021-06-28 PROCEDURE — 93798 PHYS/QHP OP CAR RHAB W/ECG: CPT

## 2021-06-29 ENCOUNTER — TRANSCRIBE ORDERS (OUTPATIENT)
Dept: ADMINISTRATIVE | Facility: HOSPITAL | Age: 56
End: 2021-06-29

## 2021-06-29 ENCOUNTER — TRANSCRIBE ORDERS (OUTPATIENT)
Dept: GENERAL RADIOLOGY | Facility: HOSPITAL | Age: 56
End: 2021-06-29

## 2021-06-29 DIAGNOSIS — N95.1 PERI-MENOPAUSAL: Primary | ICD-10-CM

## 2021-06-29 DIAGNOSIS — R05.9 COUGH: Primary | ICD-10-CM

## 2021-06-30 ENCOUNTER — TREATMENT (OUTPATIENT)
Dept: CARDIAC REHAB | Facility: HOSPITAL | Age: 56
End: 2021-06-30

## 2021-06-30 DIAGNOSIS — Z95.5 S/P PRIMARY ANGIOPLASTY WITH CORONARY STENT: Primary | ICD-10-CM

## 2021-06-30 PROCEDURE — 93798 PHYS/QHP OP CAR RHAB W/ECG: CPT

## 2021-06-30 NOTE — PROGRESS NOTES
Pt was seen today in CR for a Phase 2 visit.  Vital signs and session notes recorded in [email or web address] and will be scanned into Epic by HIM.

## 2021-07-02 ENCOUNTER — TREATMENT (OUTPATIENT)
Dept: CARDIAC REHAB | Facility: HOSPITAL | Age: 56
End: 2021-07-02

## 2021-07-02 DIAGNOSIS — Z95.5 S/P PRIMARY ANGIOPLASTY WITH CORONARY STENT: Primary | ICD-10-CM

## 2021-07-02 PROCEDURE — 93798 PHYS/QHP OP CAR RHAB W/ECG: CPT

## 2021-07-02 NOTE — PROGRESS NOTES
Pt was seen today in CR for a Phase 2 visit.  Vital signs and session notes recorded in [employer] and will be scanned into Epic by HIM.

## 2021-07-06 ENCOUNTER — APPOINTMENT (OUTPATIENT)
Dept: MAMMOGRAPHY | Facility: HOSPITAL | Age: 56
End: 2021-07-06

## 2021-07-07 ENCOUNTER — TREATMENT (OUTPATIENT)
Dept: CARDIAC REHAB | Facility: HOSPITAL | Age: 56
End: 2021-07-07

## 2021-07-07 DIAGNOSIS — Z95.5 S/P PRIMARY ANGIOPLASTY WITH CORONARY STENT: Primary | ICD-10-CM

## 2021-07-07 PROCEDURE — 93798 PHYS/QHP OP CAR RHAB W/ECG: CPT

## 2021-07-07 NOTE — PROGRESS NOTES
Pt was seen today in CR for a Phase 2 visit.  Vital signs and session notes recorded in [employer] and will be scanned into Epic by HIM.

## 2021-07-08 ENCOUNTER — HOSPITAL ENCOUNTER (OUTPATIENT)
Dept: GENERAL RADIOLOGY | Facility: HOSPITAL | Age: 56
Discharge: HOME | End: 2021-07-08

## 2021-07-08 ENCOUNTER — HOSPITAL ENCOUNTER (OUTPATIENT)
Dept: ULTRASOUND IMAGING | Facility: HOSPITAL | Age: 56
Discharge: HOME | End: 2021-07-08

## 2021-07-08 DIAGNOSIS — R05.9 COUGH: ICD-10-CM

## 2021-07-08 DIAGNOSIS — N95.1 PERI-MENOPAUSAL: ICD-10-CM

## 2021-07-08 PROCEDURE — 76830 TRANSVAGINAL US NON-OB: CPT

## 2021-07-08 PROCEDURE — 71046 X-RAY EXAM CHEST 2 VIEWS: CPT

## 2021-07-09 ENCOUNTER — TREATMENT (OUTPATIENT)
Dept: CARDIAC REHAB | Facility: HOSPITAL | Age: 56
End: 2021-07-09

## 2021-07-09 DIAGNOSIS — Z95.5 S/P PRIMARY ANGIOPLASTY WITH CORONARY STENT: Primary | ICD-10-CM

## 2021-07-09 PROCEDURE — 93798 PHYS/QHP OP CAR RHAB W/ECG: CPT

## 2021-07-09 NOTE — PROGRESS NOTES
Pt was seen today in CR for a Phase 2 visit.  Vital signs and session notes recorded in [employer] and will be scanned into Epic by HIM.

## 2021-07-12 ENCOUNTER — TREATMENT (OUTPATIENT)
Dept: CARDIAC REHAB | Facility: HOSPITAL | Age: 56
End: 2021-07-12

## 2021-07-12 DIAGNOSIS — Z95.5 S/P PRIMARY ANGIOPLASTY WITH CORONARY STENT: Primary | ICD-10-CM

## 2021-07-12 PROCEDURE — 93798 PHYS/QHP OP CAR RHAB W/ECG: CPT

## 2021-07-12 NOTE — PROGRESS NOTES
Pt was seen today in CR for a Phase 2 visit.  Vital signs and session notes recorded in [employer] and will be scanned into Epic by HIM.

## 2021-07-14 ENCOUNTER — TREATMENT (OUTPATIENT)
Dept: CARDIAC REHAB | Facility: HOSPITAL | Age: 56
End: 2021-07-14

## 2021-07-14 DIAGNOSIS — Z95.5 S/P PRIMARY ANGIOPLASTY WITH CORONARY STENT: Primary | ICD-10-CM

## 2021-07-14 PROCEDURE — 93798 PHYS/QHP OP CAR RHAB W/ECG: CPT

## 2021-07-14 NOTE — PROGRESS NOTES
"Nutrition Services    Patient Name:  Sapna Ng  YOB: 1965  MRN: [id number]  Admit Date:  (Not on file)     Review Heart Healthy Diet / Healthy Eating; review of chapter 2 \"Living Well with Heart Disease\" [employer] Book.  Emphasis’s on choose to eat healthier, portions size \"plate Method\", reading nutrition fact label, heart healthy-shopping, Choose this instead of that, and eating at home verses eating out. Provided hand outs and review per Healthy for Good Eat More Color, sodium tracker, What's  a serving, Can Processed Foods be healthy, Nutrition Facts Food Label, Too Much Sodium: recommendations 1500 mg vs 3400mg  per day.  Patient  was very receptive and asks appropriate questions. Provided business card and encourage to call for any questions or concerns.    Electronically signed by:  Daisy Bowles RD  07/14/21 11:56 EDT   "

## 2021-07-14 NOTE — PROGRESS NOTES
Pt was seen today in CR for a Phase 2 visit.  Vital signs and session notes recorded in [employer] and will be scanned into Epic by HIM.

## 2021-07-16 ENCOUNTER — APPOINTMENT (OUTPATIENT)
Dept: CARDIAC REHAB | Facility: HOSPITAL | Age: 56
End: 2021-07-16

## 2021-07-19 ENCOUNTER — TREATMENT (OUTPATIENT)
Dept: CARDIAC REHAB | Facility: HOSPITAL | Age: 56
End: 2021-07-19

## 2021-07-19 DIAGNOSIS — Z95.5 S/P PRIMARY ANGIOPLASTY WITH CORONARY STENT: Primary | ICD-10-CM

## 2021-07-19 PROCEDURE — 93798 PHYS/QHP OP CAR RHAB W/ECG: CPT

## 2021-07-19 NOTE — PROGRESS NOTES
Pt was seen today in CR for a Phase 2 visit.  Vital signs and session notes recorded in [employer] and will be scanned into Epic by HIM.

## 2021-07-21 ENCOUNTER — TREATMENT (OUTPATIENT)
Dept: CARDIAC REHAB | Facility: HOSPITAL | Age: 56
End: 2021-07-21

## 2021-07-21 DIAGNOSIS — Z95.5 S/P PRIMARY ANGIOPLASTY WITH CORONARY STENT: Primary | ICD-10-CM

## 2021-07-21 PROCEDURE — 93798 PHYS/QHP OP CAR RHAB W/ECG: CPT

## 2021-07-21 NOTE — PROGRESS NOTES
Pt was seen today in CR for a Phase 2 visit.  Vital signs and session notes recorded in [employer] and will be scanned into Epic by HIM.

## 2021-07-23 ENCOUNTER — TREATMENT (OUTPATIENT)
Dept: CARDIAC REHAB | Facility: HOSPITAL | Age: 56
End: 2021-07-23

## 2021-07-23 DIAGNOSIS — Z95.5 S/P PRIMARY ANGIOPLASTY WITH CORONARY STENT: Primary | ICD-10-CM

## 2021-07-23 PROCEDURE — 93798 PHYS/QHP OP CAR RHAB W/ECG: CPT

## 2021-07-23 NOTE — PROGRESS NOTES
Pt was seen today in CR for a Phase 2 visit.  Vital signs and session notes recorded in [employer] and will be scanned into Epic by HIM.

## 2021-07-26 ENCOUNTER — APPOINTMENT (OUTPATIENT)
Dept: CARDIAC REHAB | Facility: HOSPITAL | Age: 56
End: 2021-07-26

## 2021-07-28 ENCOUNTER — TREATMENT (OUTPATIENT)
Dept: CARDIAC REHAB | Facility: HOSPITAL | Age: 56
End: 2021-07-28

## 2021-07-28 DIAGNOSIS — Z95.5 S/P PRIMARY ANGIOPLASTY WITH CORONARY STENT: Primary | ICD-10-CM

## 2021-07-28 PROCEDURE — 93798 PHYS/QHP OP CAR RHAB W/ECG: CPT

## 2021-07-28 NOTE — PROGRESS NOTES
Pt was seen today in CR for a Phase 2 visit.  Vital signs and session notes recorded in [employer] and will be scanned into Epic by HIM.

## 2021-07-30 ENCOUNTER — TREATMENT (OUTPATIENT)
Dept: CARDIAC REHAB | Facility: HOSPITAL | Age: 56
End: 2021-07-30

## 2021-07-30 DIAGNOSIS — Z95.5 S/P PRIMARY ANGIOPLASTY WITH CORONARY STENT: Primary | ICD-10-CM

## 2021-07-30 PROCEDURE — 93798 PHYS/QHP OP CAR RHAB W/ECG: CPT

## 2021-07-30 NOTE — PROGRESS NOTES
Pt was seen today in CR for a Phase 2 visit.  Vital signs and session notes recorded in [employer] and will be scanned into Epic by HIM.

## 2021-08-02 ENCOUNTER — TREATMENT (OUTPATIENT)
Dept: CARDIAC REHAB | Facility: HOSPITAL | Age: 56
End: 2021-08-02

## 2021-08-02 DIAGNOSIS — Z95.5 S/P PRIMARY ANGIOPLASTY WITH CORONARY STENT: Primary | ICD-10-CM

## 2021-08-02 PROCEDURE — 93798 PHYS/QHP OP CAR RHAB W/ECG: CPT

## 2021-08-02 NOTE — PROGRESS NOTES
Pt was seen today in CR for a Phase 2 visit.  Vital signs and session notes recorded in [employer] and will be scanned into Epic by HIM.

## 2021-08-03 ENCOUNTER — HOSPITAL ENCOUNTER (OUTPATIENT)
Dept: MAMMOGRAPHY | Facility: HOSPITAL | Age: 56
Discharge: HOME | End: 2021-08-03
Admitting: FAMILY MEDICINE

## 2021-08-03 DIAGNOSIS — Z12.31 VISIT FOR SCREENING MAMMOGRAM: ICD-10-CM

## 2021-08-03 PROCEDURE — 77067 SCR MAMMO BI INCL CAD: CPT | Performed by: RADIOLOGY

## 2021-08-03 PROCEDURE — 77063 BREAST TOMOSYNTHESIS BI: CPT | Performed by: RADIOLOGY

## 2021-08-03 PROCEDURE — 77063 BREAST TOMOSYNTHESIS BI: CPT

## 2021-08-03 PROCEDURE — 77067 SCR MAMMO BI INCL CAD: CPT

## 2021-08-04 ENCOUNTER — TREATMENT (OUTPATIENT)
Dept: CARDIAC REHAB | Facility: HOSPITAL | Age: 56
End: 2021-08-04

## 2021-08-04 ENCOUNTER — APPOINTMENT (OUTPATIENT)
Dept: CARDIAC REHAB | Facility: HOSPITAL | Age: 56
End: 2021-08-04

## 2021-08-04 DIAGNOSIS — Z95.5 S/P PRIMARY ANGIOPLASTY WITH CORONARY STENT: Primary | ICD-10-CM

## 2021-08-04 PROCEDURE — 93798 PHYS/QHP OP CAR RHAB W/ECG: CPT

## 2021-08-04 NOTE — PROGRESS NOTES
Pt was seen today in CR for a Phase 2 visit.  Vital signs and session notes recorded in [employer] and will be scanned into Epic by HIM.

## 2021-08-05 ENCOUNTER — OFFICE VISIT (OUTPATIENT)
Dept: CARDIOLOGY | Facility: CLINIC | Age: 56
End: 2021-08-05

## 2021-08-05 VITALS
WEIGHT: 164 LBS | HEART RATE: 74 BPM | DIASTOLIC BLOOD PRESSURE: 72 MMHG | SYSTOLIC BLOOD PRESSURE: 120 MMHG | HEIGHT: 60 IN | BODY MASS INDEX: 32.2 KG/M2 | RESPIRATION RATE: 20 BRPM | OXYGEN SATURATION: 97 %

## 2021-08-05 DIAGNOSIS — I10 ESSENTIAL HYPERTENSION: ICD-10-CM

## 2021-08-05 DIAGNOSIS — I25.10 CORONARY ARTERY DISEASE INVOLVING NATIVE CORONARY ARTERY OF NATIVE HEART WITHOUT ANGINA PECTORIS: Primary | ICD-10-CM

## 2021-08-05 PROCEDURE — 99214 OFFICE O/P EST MOD 30 MIN: CPT | Performed by: INTERNAL MEDICINE

## 2021-08-05 NOTE — PROGRESS NOTES
[facility] Follow Up Note    Sapna Ng  [id number]  2021    Primary Care Provider: Letty Diaz MD    Chief Complaint: Regular follow-up    History of Present Illness:   Mrs. Sapna Ng is a 56 y.o. female who presents to the Cardiology Clinic for regular follow-up. The patient has a past medical history significant for hypertension.  She has a past cardiac history significant for severe one-vessel coronary artery disease presenting with an anterior STEMI in .  She underwent PCI to the mid LAD with placement of a 3.0 x 28 mm Xience CLARISSA at that time.  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  A post MI echocardiogram at that time showed preserved LV systolic function, with akinesis of the apex and anteroseptal wall.  She returns to cardiology clinic today for routine follow-up.  Since her last appointment, the patient has done well without any significant changes in her health.  She continues to work with cardiac rehab, exercising the elliptical for up to 30 minutes at a time.  She denies exertional chest pain or anginal symptoms.  She continues to tolerate DAPT without significant bleeding, however she does note easy bruising.  No significant exertional dyspnea.  No new complaints today.    Past Cardiac Testin. Last Coronary Angio: 2021              1.  Thrombotic occlusion of the mid LAD is culprit for anterior STEMI                          -Successful PCI with placement of a 3.0 x 28 mm Xience CLARISSA              2.  Mild luminal irregularities in the RCA and LCx              3.  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 Prior Stress Testing: None  3. Last Echo: 2021              1.  LVEF 55-60%              2.  Akinesis of the apex and anteroseptal wall              3.  Grade 1 diastolic dysfunction  4. Prior Holter Monitor: None    Review of  Systems:   Review of Systems   Constitutional: Negative for activity change, appetite change, chills, diaphoresis, fatigue, fever, unexpected weight gain and unexpected weight loss.   Eyes: Negative for blurred vision and double vision.   Respiratory: Negative for cough, chest tightness, shortness of breath and wheezing.    Cardiovascular: Negative for chest pain, palpitations and leg swelling.   Gastrointestinal: Negative for abdominal pain, anal bleeding, blood in stool and GERD.   Endocrine: Negative for cold intolerance and heat intolerance.   Genitourinary: Negative for hematuria.   Neurological: Negative for dizziness, syncope, weakness and light-headedness.   Hematological: Does not bruise/bleed easily.   Psychiatric/Behavioral: Negative for depressed mood and stress. The patient is not nervous/anxious.        I have reviewed and/or updated the patient's past medical, past surgical, family, social history, problem list and allergies as appropriate.     Medications:     Current Outpatient Medications:   •  aspirin 81 MG EC tablet, Take 1 tablet by mouth Daily., Disp: 180 tablet, Rfl: 3  •  atorvastatin (LIPITOR) 80 MG tablet, Take 1 tablet by mouth Every Night., Disp: 90 tablet, Rfl: 3  •  carvedilol (COREG) 3.125 MG tablet, Take 1 tablet by mouth 2 (Two) Times a Day., Disp: 180 tablet, Rfl: 3  •  cetirizine (zyrTEC) 10 MG tablet, Take 10 mg by mouth every night at bedtime., Disp: , Rfl:   •  ferrous sulfate 140 (45 Fe) MG tablet controlled-release tablet, Take 140 mg by mouth Daily With Breakfast., Disp: , Rfl:   •  losartan (Cozaar) 25 MG tablet, Take 1 tablet by mouth Daily., Disp: 90 tablet, Rfl: 3  •  montelukast (SINGULAIR) 10 MG tablet, Take 10 mg by mouth Every Night., Disp: , Rfl:   •  ticagrelor (BRILINTA) 90 MG tablet tablet, Take 1 tablet by mouth Every 12 (Twelve) Hours., Disp: 180 tablet, Rfl: 3    Physical Exam:  Vital Signs:   Vitals:    08/05/21 0909   BP: 120/72   BP Location: Right arm  "  Patient Position: Sitting   Cuff Size: Adult   Pulse: 74   Resp: 20   SpO2: 97%   Weight: 74.4 kg (164 lb)   Height: 152.4 cm (60\")       Physical Exam  Constitutional:       General: She is not in acute distress.     Appearance: Normal appearance. She is well-developed. She is not diaphoretic.   HENT:      Head: Normocephalic and atraumatic.   Eyes:      General: No scleral icterus.     Pupils: Pupils are equal, round, and reactive to light.   Neck:      Trachea: No tracheal deviation.   Cardiovascular:      Rate and Rhythm: Normal rate and regular rhythm.      Heart sounds: Normal heart sounds. No murmur heard.   No friction rub. No gallop.       Comments: Normal JVD.  2+ right radial pulse  Pulmonary:      Effort: Pulmonary effort is normal. No respiratory distress.      Breath sounds: Normal breath sounds. No stridor. No wheezing or rales.   Chest:      Chest wall: No tenderness.   Abdominal:      General: Bowel sounds are normal. There is no distension.      Palpations: Abdomen is soft.      Tenderness: There is no abdominal tenderness. There is no guarding or rebound.   Musculoskeletal:         General: No swelling. Normal range of motion.      Cervical back: Neck supple. No tenderness.   Lymphadenopathy:      Cervical: No cervical adenopathy.   Skin:     General: Skin is warm and dry.      Findings: No erythema.   Neurological:      General: No focal deficit present.      Mental Status: She is alert and oriented to person, place, and time.   Psychiatric:         Mood and Affect: Mood normal.         Behavior: Behavior normal.         Results Review:   I reviewed the patient's new clinical results.      Assessment / Plan:     1.   Coronary artery disease  --Initially presented with anterior STEMI in 4/21, underwent PCI to the mid LAD, mild nonobstructive residual disease in the LAD and LCx  --Continues to do well following PCI without recurrent chest pain or anginal symptoms  --Continues to participate in " cardiac rehab  --Continue DAPT with aspirin and Brilinta uninterrupted through 4/21, then aspirin monotherapy indefinitely  --Continue high intensity statin  --Continue metoprolol and losartan  --Follow-up in 6 months, or sooner if required     2.  Hypertension  --Remains well controlled with current antihypertensives        Follow Up:   Return in about 6 months (around 2/5/2022).      Thank you for allowing me to participate in the care of your patient. Please to not hesitate to contact me with additional questions or concerns.     LARISA Sawyer MD  Interventional Cardiology   08/05/2021  09:16 EDT

## 2021-08-06 ENCOUNTER — APPOINTMENT (OUTPATIENT)
Dept: CARDIAC REHAB | Facility: HOSPITAL | Age: 56
End: 2021-08-06

## 2021-08-09 ENCOUNTER — TREATMENT (OUTPATIENT)
Dept: CARDIAC REHAB | Facility: HOSPITAL | Age: 56
End: 2021-08-09

## 2021-08-09 DIAGNOSIS — Z95.5 S/P PRIMARY ANGIOPLASTY WITH CORONARY STENT: Primary | ICD-10-CM

## 2021-08-09 PROCEDURE — 93798 PHYS/QHP OP CAR RHAB W/ECG: CPT

## 2021-08-09 NOTE — PROGRESS NOTES
Pt was seen today in CR for a Phase 2 visit.  Vital signs and session notes recorded in [email or web address] and will be scanned into Epic by HIM.

## 2021-08-11 ENCOUNTER — TREATMENT (OUTPATIENT)
Dept: CARDIAC REHAB | Facility: HOSPITAL | Age: 56
End: 2021-08-11

## 2021-08-11 DIAGNOSIS — Z95.5 S/P PRIMARY ANGIOPLASTY WITH CORONARY STENT: Primary | ICD-10-CM

## 2021-08-11 PROCEDURE — 93798 PHYS/QHP OP CAR RHAB W/ECG: CPT

## 2021-08-11 NOTE — PROGRESS NOTES
Pt was seen today in CR for a Phase 2 visit.  Vital signs and session notes recorded in [facility] and will be scanned into Epic by HIM.

## 2021-10-05 ENCOUNTER — TELEPHONE (OUTPATIENT)
Dept: CARDIOLOGY | Facility: CLINIC | Age: 56
End: 2021-10-05

## 2021-10-11 ENCOUNTER — OFFICE VISIT (OUTPATIENT)
Dept: OBSTETRICS AND GYNECOLOGY | Facility: CLINIC | Age: 56
End: 2021-10-11

## 2021-10-11 VITALS
WEIGHT: 165.6 LBS | HEIGHT: 60 IN | SYSTOLIC BLOOD PRESSURE: 124 MMHG | DIASTOLIC BLOOD PRESSURE: 80 MMHG | BODY MASS INDEX: 32.51 KG/M2

## 2021-10-11 DIAGNOSIS — D25.9 UTERINE LEIOMYOMA, UNSPECIFIED LOCATION: ICD-10-CM

## 2021-10-11 DIAGNOSIS — N95.1 PERIMENOPAUSAL: Primary | ICD-10-CM

## 2021-10-11 PROCEDURE — 99213 OFFICE O/P EST LOW 20 MIN: CPT | Performed by: NURSE PRACTITIONER

## 2021-10-11 RX ORDER — NITROGLYCERIN 0.4 MG/1
0.4 TABLET SUBLINGUAL
COMMUNITY
End: 2023-02-28 | Stop reason: SDUPTHER

## 2021-10-11 RX ORDER — ACETAMINOPHEN 160 MG
2000 TABLET,DISINTEGRATING ORAL DAILY
COMMUNITY
Start: 2021-09-15

## 2021-10-11 NOTE — PROGRESS NOTES
Chief Complaint   Patient presents with   • New Gyn       Subjective   HPI  Sapna Ng is a 56 y.o. female, , who presents for a consult regarding irregular menses, and a recent U/S which revealed a thickened endometrium and fibroids.    Pt states that prior to this last year, she was having regular periods, occurring monthly. She reports that she had went 120 w/ out bleeding, had a cycle. Went another 121 days w/ out bleeding, had a cycle and is currently 177 days since her last episode of bleeding. She reports that she has never been one complete year with out her menses, but was told her lab work is consistent with menopause.      Patient s/p heart attack 2021, and has been on blood thinner. She does report that her last period was heavier than usual. Her periods last approx 5-7 days, with 2-3 heavy days.     Her PCP ordered a TVUS that she completed 21, that measured her endometrial thickness 13.7 mm, and the impression reads: enlarged fibroid uterus.         Her last LMP was No LMP recorded. Patient is perimenopausal..  Periods are irregular, lasting 7 days.  Dysmenorrhea:none. Partner Status: Marital Status: .      Additional OB/GYN History   Current contraception: contraceptive methods: Tubal ligation  Desires to: do not start contraception  Last Pap : 2021  Last Completed Pap Smear     This patient has no relevant Health Maintenance data.        History of abnormal Pap smear: yes - h/o, repeat was normal 6 months later  Last mammogram:   Last Completed Mammogram          MAMMOGRAM (Every 2 Years) Next due on 8/3/2023    2021  Mammo Screening Digital Tomosynthesis Bilateral With CAD    2019  Mammo Screening Digital Tomosynthesis Bilateral With CAD    2018  Mammo Screening Digital Tomosynthesis Bilateral With CAD    2017  Mammo Screening Digital Tomosynthesis Bilateral With CAD    2015  Mammo screening bilateral    Only the first 5 history entries  "have been loaded, but more history exists.              Tobacco Usage?: No   OB History        3    Para   2    Term   2            AB   1    Living   2       SAB   1    IAB        Ectopic        Molar        Multiple        Live Births                    Health Maintenance   Topic Date Due   • Annual Gynecologic Pelvic and Breast Exam  Never done   • ANNUAL PHYSICAL  Never done   • COLORECTAL CANCER SCREENING  2014   • HEPATITIS C SCREENING  Never done   • PAP SMEAR  Never done   • INFLUENZA VACCINE  2021   • LIPID PANEL  2022   • MAMMOGRAM  2023   • TDAP/TD VACCINES (2 - Td or Tdap) 2030   • COVID-19 Vaccine  Completed   • ZOSTER VACCINE  Completed   • Pneumococcal Vaccine 0-64  Aged Out       The additional following portions of the patient's history were reviewed and updated as appropriate: allergies, current medications, past family history, past medical history, past social history, past surgical history and problem list.    Review of Systems   Constitutional: Negative.    HENT: Negative.    Respiratory: Negative.    Cardiovascular: Negative.    Gastrointestinal: Negative.    Genitourinary: Negative.    Musculoskeletal: Negative.    Skin: Negative.    Allergic/Immunologic: Negative.    Neurological: Negative.    Hematological: Negative.    Psychiatric/Behavioral: Negative.        I have reviewed and agree with the HPI, ROS, and historical information as entered above. Cydney Mccarthy, APRN    Objective   /80   Ht 152.4 cm (60\")   Wt 75.1 kg (165 lb 9.6 oz)   BMI 32.34 kg/m²     Physical Exam  Vitals and nursing note reviewed.   Constitutional:       Appearance: Normal appearance.   HENT:      Head: Normocephalic and atraumatic.   Pulmonary:      Effort: Pulmonary effort is normal.   Neurological:      Mental Status: She is alert and oriented to person, place, and time.   Psychiatric:         Behavior: Behavior normal.         Assessment/Plan     Assessment "     Problem List Items Addressed This Visit     None      Visit Diagnoses     Perimenopausal    -  Primary    with infrequent periods x 1 year, occurring approx 4 months    Relevant Orders    Estradiol    Follicle Stimulating Hormone    Luteinizing Hormone    Uterine leiomyoma, unspecified location                Plan     1. Reviewed with Dr Muniz. Period changes, labs and U/S consistent with perimenopause. Pt denies AUB or intermenstrual bleeding, and has not gone a full year without a period. Will repeat labs and have pt follow up with Dr Muniz in 3 months with U/S, follow up fibroids. Pt to call with any changes.       Cydney Mccarthy, APRN  10/11/2021

## 2021-10-12 ENCOUNTER — TELEPHONE (OUTPATIENT)
Dept: OBSTETRICS AND GYNECOLOGY | Facility: CLINIC | Age: 56
End: 2021-10-12

## 2021-10-12 ENCOUNTER — PATIENT ROUNDING (BHMG ONLY) (OUTPATIENT)
Dept: OBSTETRICS AND GYNECOLOGY | Facility: CLINIC | Age: 56
End: 2021-10-12

## 2021-10-12 LAB
ESTRADIOL SERPL-MCNC: 32.8 PG/ML
FSH SERPL-ACNC: 39.7 MIU/ML
LH SERPL-ACNC: 43.7 MIU/ML

## 2021-10-12 NOTE — TELEPHONE ENCOUNTER
S/w pt and she wanted her lab results, I see lab results are back but I cannot see a note from Cydney Mccarthy. I told patient I would discuss these with [facility] and give her a cb this by this evening. She v/u

## 2021-10-12 NOTE — PROGRESS NOTES
October 12, 2021    Hello, may I speak with Sapna Ng?    My name is Nuha    I am  with [facility]   [facility]  [address]  [facility] [address] [phone].    Before we get started may I verify your date of birth? 1965    I am calling to officially welcome you to our practice and ask about your recent visit. Is this a good time to talk? yes    Tell me about your visit with us. What things went well?  Cydney was cornel.       We're always looking for ways to make our patients' experiences even better. Do you have recommendations on ways we may improve?  yes, there was a bit of a wait. She also thought she was going to see the MD and not an APRN.    Overall were you satisfied with your first visit to our practice? yes       I appreciate you taking the time to speak with me today. Is there anything else I can do for you? Yes, can I get my lab results. Request forwarded to the clinical pool.      Thank you, and have a great day.

## 2021-10-13 NOTE — TELEPHONE ENCOUNTER
lvom for pt to CB in regards to lab results.     Per TED Mccarthy recent labs are consistent with menopause.

## 2021-10-18 ENCOUNTER — OFFICE VISIT (OUTPATIENT)
Dept: CARDIOLOGY | Facility: CLINIC | Age: 56
End: 2021-10-18

## 2021-10-18 VITALS
SYSTOLIC BLOOD PRESSURE: 132 MMHG | HEART RATE: 68 BPM | WEIGHT: 166 LBS | BODY MASS INDEX: 32.59 KG/M2 | HEIGHT: 60 IN | OXYGEN SATURATION: 98 % | RESPIRATION RATE: 18 BRPM | DIASTOLIC BLOOD PRESSURE: 72 MMHG

## 2021-10-18 DIAGNOSIS — G47.33 MODERATE OBSTRUCTIVE SLEEP APNEA: ICD-10-CM

## 2021-10-18 DIAGNOSIS — I10 ESSENTIAL HYPERTENSION: ICD-10-CM

## 2021-10-18 DIAGNOSIS — E78.2 MIXED HYPERLIPIDEMIA: ICD-10-CM

## 2021-10-18 DIAGNOSIS — I25.10 CORONARY ARTERY DISEASE INVOLVING NATIVE CORONARY ARTERY OF NATIVE HEART WITHOUT ANGINA PECTORIS: ICD-10-CM

## 2021-10-18 DIAGNOSIS — I49.8 FLUTTERING HEART: Primary | ICD-10-CM

## 2021-10-18 PROCEDURE — 99214 OFFICE O/P EST MOD 30 MIN: CPT | Performed by: NURSE PRACTITIONER

## 2021-10-18 PROCEDURE — 93000 ELECTROCARDIOGRAM COMPLETE: CPT | Performed by: NURSE PRACTITIONER

## 2021-10-18 NOTE — PROGRESS NOTES
"             [facility] Office Follow Up Note    Sapna Ng  [id number]  10/18/2021    Primary Care Provider: Letty Diaz MD   Referring Provider: No ref. provider found    Chief Complaint: Heart fluttering    History of Present Illness:   Mrs. Sapna Ng is a 56 y.o. female who presents to the Cardiology Clinic for fluttering of heart.  The patient has a past medical history significant for hypertension.  She has a past cardiac history significant for severe one-vessel coronary artery disease presenting with an anterior STEMI in .  She underwent PCI to the mid LAD with placement of a 3.0 x 28 mm Xience CLARISSA at that time.  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  A post MI echocardiogram at that time showed preserved LV systolic function, with akinesis of the apex and anteroseptal wall.  She presents today for regular follow-up.  She reports a 2 to 3-week history of \"heart fluttering\" that occurs approximately twice a week.  This tends to occur when she is at rest.  She denies any chest discomfort or dyspnea with this, but describes it more of a \"flip-flop\" sensation.  She has 2 caffeinated drinks daily.  She is compliant with her CPAP.  The patient denies significant bruising or bleeding with Brilinta and aspirin.  She specifically denies chest discomfort and dyspnea.  She denies palpitations, dizziness, syncope.  She denies orthopnea and lower extremity edema.  She offers no other complaints or concerns at this time.      Past Cardiac Testin. Last Coronary Angio: 2021              1.  Thrombotic occlusion of the mid LAD is culprit for anterior STEMI                          -Successful PCI with placement of a 3.0 x 28 mm Xience CLARISSA              2.  Mild luminal irregularities in the RCA and LCx              3.  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 Prior Stress " Testing: None  3. Last Echo: 4/22/2021              1.  LVEF 55-60%              2.  Akinesis of the apex and anteroseptal wall              3.  Grade 1 diastolic dysfunction  4. Prior Holter Monitor: None    Review of Systems:   Review of Systems   Constitutional: Negative for activity change, chills, diaphoresis, fatigue, fever and unexpected weight gain.   Eyes: Negative for blurred vision and visual disturbance.   Respiratory: Negative for apnea, cough, chest tightness, shortness of breath and wheezing.    Cardiovascular: Negative for chest pain, palpitations and leg swelling.   Gastrointestinal: Negative for abdominal distention, blood in stool, GERD and indigestion.   Endocrine: Negative for cold intolerance and heat intolerance.   Genitourinary: Negative for hematuria.   Musculoskeletal: Negative for gait problem, joint swelling and myalgias.   Skin: Negative for color change, pallor and bruise.   Neurological: Negative for dizziness, seizures, syncope, weakness, light-headedness, numbness, headache and confusion.   Hematological: Does not bruise/bleed easily.   Psychiatric/Behavioral: Negative for behavioral problems, sleep disturbance, suicidal ideas and depressed mood.     I have reviewed and confirmed the accuracy of the ROS as documented by the MA/LPN/RN WESTLEY Rousseau    I have reviewed and/or updated the patient's past medical, past surgical, family, social history, problem list and allergies as appropriate.     Medications:     Current Outpatient Medications:   •  aspirin 81 MG EC tablet, Take 1 tablet by mouth Daily., Disp: 180 tablet, Rfl: 3  •  atorvastatin (LIPITOR) 80 MG tablet, Take 1 tablet by mouth Every Night., Disp: 90 tablet, Rfl: 3  •  carvedilol (COREG) 3.125 MG tablet, Take 1 tablet by mouth 2 (Two) Times a Day., Disp: 180 tablet, Rfl: 3  •  cetirizine (zyrTEC) 10 MG tablet, Take 10 mg by mouth every night at bedtime., Disp: , Rfl:   •  Cholecalciferol (Vitamin D3) 50 MCG  "(2000 UT) capsule, , Disp: , Rfl:   •  ferrous sulfate 140 (45 Fe) MG tablet controlled-release tablet, Take 140 mg by mouth Daily With Breakfast., Disp: , Rfl:   •  losartan (Cozaar) 25 MG tablet, Take 1 tablet by mouth Daily., Disp: 90 tablet, Rfl: 3  •  montelukast (SINGULAIR) 10 MG tablet, Take 10 mg by mouth Every Night., Disp: , Rfl:   •  nitroglycerin (NITROSTAT) 0.4 MG SL tablet, nitroglycerin 0.4 mg sublingual tablet  1 UNDER THE TONGUE AS NEEDED FOR ANGINA, MAY REPEAT EVERY 5 MINS FOR UP THREE DOSES, Disp: , Rfl:   •  ticagrelor (BRILINTA) 90 MG tablet tablet, Take 1 tablet by mouth Every 12 (Twelve) Hours., Disp: 180 tablet, Rfl: 3  •  Unable to find, 1 each 1 (One) Time. Med Name: Allergy injections, Disp: , Rfl:     Physical Exam:  Vital Signs:   Vitals:    10/18/21 1524   BP: 132/72   BP Location: Right arm   Patient Position: Sitting   Cuff Size: Adult   Pulse: 68   Resp: 18   SpO2: 98%   Weight: 75.3 kg (166 lb)   Height: 152.4 cm (60\")     Body mass index is 32.42 kg/m².    Physical Exam  Vitals and nursing note reviewed.   Constitutional:       General: She is not in acute distress.     Appearance: Normal appearance. She is well-developed.      Comments: BMI 32.4 kg/m²   HENT:      Head: Normocephalic and atraumatic.      Mouth/Throat:      Mouth: Mucous membranes are moist.   Eyes:      General: No scleral icterus.     Extraocular Movements: Extraocular movements intact.   Neck:      Trachea: Trachea normal.   Cardiovascular:      Rate and Rhythm: Normal rate and regular rhythm.      Pulses: Normal pulses.      Heart sounds: Normal heart sounds. No murmur heard.  No friction rub. No gallop.    Pulmonary:      Effort: Pulmonary effort is normal.      Breath sounds: Normal breath sounds.   Abdominal:      Palpations: Abdomen is soft.      Tenderness: There is no abdominal tenderness.   Musculoskeletal:         General: Normal range of motion.      Cervical back: Neck supple.      Right lower leg: No " edema.      Left lower leg: No edema.   Skin:     General: Skin is warm and dry.      Findings: No bruising, lesion or rash.   Neurological:      Mental Status: She is alert and oriented to person, place, and time.      Motor: No weakness.      Gait: Gait normal.   Psychiatric:         Mood and Affect: Mood normal.         Behavior: Behavior normal. Behavior is cooperative.         Thought Content: Thought content does not include suicidal ideation.         Results Review:   I reviewed the patient's new clinical results.      ECG 12 Lead    Date/Time: 10/18/2021 10:16 AM  Performed by: Rosemarie Ozuna APRN  Authorized by: Rosemarie Ozuna APRN   Comparison: compared with previous ECG from 4/23/2021  Rhythm: sinus rhythm  Ectopy: unifocal PVCs  Rate: normal  BPM: 61  QRS axis: left  Other findings: low voltage    Clinical impression: abnormal EKG  Comments: Possible remote history of anteroseptal infarct            Assessment / Plan:   Diagnoses and all orders for this visit:    1. Fluttering heart (Primary)  --ECG today shows sinus rhythm with occasional PVCs  --TSH, ordered  --7-day Holter monitor  --Follow-up with Dr. Sawyer in 1 month    2. Coronary artery disease involving native coronary artery of native heart without angina pectoris  --Initially presented with anterior STEMI in 4/21, underwent PCI to the mid LAD, mild nonobstructive residual disease in the LAD and LCx  --Continues to do well following PCI without recurrent chest pain or anginal symptoms  --Continue DAPT with aspirin and Brilinta uninterrupted through 4/22, then aspirin monotherapy indefinitely  --Continue high intensity statin  --Continue metoprolol and losartan    3. Essential hypertension  --Encouraged low-sodium diet and moderate weight loss for increased BP control  --Continue current antihypertensives    4. Mixed hyperlipidemia  --LDL goal <70 mg/dL  --Continue high-intensity statin    5. Moderate obstructive sleep  apnea  --Compliant with CPAP      Preventative Cardiology:   Tobacco Cessation: N/A   Advance Care Planning: ACP discussion was held with the patient during this visit. Patient does not have an advance directive, information provided.     Follow Up:   Return in about 1 month (around 11/18/2021) for Follow-up with Dr. Sawyer.      Thank you for allowing me to participate in the care of your patient. Please to not hesitate to contact me with additional questions or concerns.     Rosemarie Ozuna, APRN

## 2021-11-24 ENCOUNTER — LAB (OUTPATIENT)
Dept: LAB | Facility: HOSPITAL | Age: 56
End: 2021-11-24

## 2021-11-24 DIAGNOSIS — I49.8 FLUTTERING HEART: ICD-10-CM

## 2021-11-24 LAB — TSH SERPL DL<=0.05 MIU/L-ACNC: 2.57 UIU/ML (ref 0.27–4.2)

## 2021-11-24 PROCEDURE — 36415 COLL VENOUS BLD VENIPUNCTURE: CPT

## 2021-11-24 PROCEDURE — 84443 ASSAY THYROID STIM HORMONE: CPT

## 2021-11-30 ENCOUNTER — OFFICE VISIT (OUTPATIENT)
Dept: CARDIOLOGY | Facility: CLINIC | Age: 56
End: 2021-11-30

## 2021-11-30 VITALS
BODY MASS INDEX: 32.2 KG/M2 | HEIGHT: 60 IN | SYSTOLIC BLOOD PRESSURE: 122 MMHG | RESPIRATION RATE: 18 BRPM | DIASTOLIC BLOOD PRESSURE: 82 MMHG | HEART RATE: 69 BPM | OXYGEN SATURATION: 98 % | WEIGHT: 164 LBS

## 2021-11-30 DIAGNOSIS — I10 PRIMARY HYPERTENSION: ICD-10-CM

## 2021-11-30 DIAGNOSIS — I25.10 CORONARY ARTERIOSCLEROSIS: Primary | ICD-10-CM

## 2021-11-30 DIAGNOSIS — R00.2 PALPITATIONS: ICD-10-CM

## 2021-11-30 PROCEDURE — 99214 OFFICE O/P EST MOD 30 MIN: CPT | Performed by: INTERNAL MEDICINE

## 2021-11-30 NOTE — PROGRESS NOTES
"             [facility] Cardiology Office Follow Up Note    Sapna Ng  [id number]  2021    Primary Care Provider: Letty Diaz MD    Chief Complaint: Follow-up for palpitations    History of Present Illness:   Mrs. Sapna Ng is a 56 y.o. female who presents to the Cardiology Clinic for follow-up of palpitations.  The patient has a past medical history significant for hypertension.  She has a past cardiac history significant for severe one-vessel coronary artery disease presenting with an anterior STEMI in .  She underwent PCI to the mid LAD with placement of a 3.0 x 28 mm Xience CLARISSA at that time.  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  A post MI echocardiogram at that time showed preserved LV systolic function, with akinesis of the apex and anteroseptal wall.   At the time of her last follow-up in the cardiology clinic, the patient reported episodes of palpitations.  She subsequent underwent outpatient cardiac monitoring in 10/21, showing no significant arrhythmias and rare ventricular ectopy with no correlation of symptoms.  She presents today for regular follow-up.  Since her last appointment, the patient reports interval improvement in her palpitations.  Her palpitations are currently rare, described as a brief \"fluttering\" sensation before resolving spontaneously within several seconds.  No sustained episodes.  No associated dizziness, lightheadedness, or presyncopal symptoms.  No history of syncope.  She denies any recurrent episodes of chest pain or anginal symptoms.  She continues to tolerate DAPT with aspirin and Brilinta, however states her insurance will no longer cover her Brilinta.  No other specific complaints today.     Past Cardiac Testin. Last Coronary Angio: 2021              1.  Thrombotic occlusion of the mid LAD is culprit for anterior " STEMI                          -Successful PCI with placement of a 3.0 x 28 mm Xience CLARISSA              2.  Mild luminal irregularities in the RCA and LCx              3.  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 Prior Stress Testing: None  3. Last Echo: 4/22/2021              1.  LVEF 55-60%              2.  Akinesis of the apex and anteroseptal wall              3.  Grade 1 diastolic dysfunction  4. Prior Holter Monitor: 14-day monitor 10/21   1.  The predominant rhythm is sinus rhythm with average heart rate 65 bpm   2.  No sustained arrhythmias   3.  Rare ventricular ectopy   4.  No correlation of symptoms to ectopy    Review of Systems:   Review of Systems   Constitutional: Negative for activity change, appetite change, chills, diaphoresis, fatigue, fever, unexpected weight gain and unexpected weight loss.   Eyes: Negative for blurred vision and double vision.   Respiratory: Negative for cough, chest tightness, shortness of breath and wheezing.    Cardiovascular: Positive for palpitations. Negative for chest pain and leg swelling.   Gastrointestinal: Negative for abdominal pain, anal bleeding, blood in stool and GERD.   Endocrine: Negative for cold intolerance and heat intolerance.   Genitourinary: Negative for hematuria.   Neurological: Negative for dizziness, syncope, weakness and light-headedness.   Hematological: Does not bruise/bleed easily.   Psychiatric/Behavioral: Negative for depressed mood and stress. The patient is not nervous/anxious.        I have reviewed and/or updated the patient's past medical, past surgical, family, social history, problem list and allergies as appropriate.     Medications:     Current Outpatient Medications:   •  aspirin 81 MG EC tablet, Take 1 tablet by mouth Daily., Disp: 180 tablet, Rfl: 3  •  atorvastatin (LIPITOR) 80 MG tablet, Take 1 tablet by mouth Every Night., Disp: 90 tablet, Rfl: 3  •  carvedilol (COREG) 3.125 MG tablet, Take 1 tablet by mouth 2  "(Two) Times a Day., Disp: 180 tablet, Rfl: 3  •  cetirizine (zyrTEC) 10 MG tablet, Take 10 mg by mouth every night at bedtime., Disp: , Rfl:   •  Cholecalciferol (Vitamin D3) 50 MCG (2000 UT) capsule, Daily., Disp: , Rfl:   •  ferrous sulfate 140 (45 Fe) MG tablet controlled-release tablet, Take 140 mg by mouth Daily With Breakfast., Disp: , Rfl:   •  losartan (Cozaar) 25 MG tablet, Take 1 tablet by mouth Daily., Disp: 90 tablet, Rfl: 3  •  montelukast (SINGULAIR) 10 MG tablet, Take 10 mg by mouth Every Night., Disp: , Rfl:   •  nitroglycerin (NITROSTAT) 0.4 MG SL tablet, nitroglycerin 0.4 mg sublingual tablet  1 UNDER THE TONGUE AS NEEDED FOR ANGINA, MAY REPEAT EVERY 5 MINS FOR UP THREE DOSES, Disp: , Rfl:   •  ticagrelor (BRILINTA) 90 MG tablet tablet, Take 1 tablet by mouth Every 12 (Twelve) Hours., Disp: 180 tablet, Rfl: 3  •  Unable to find, 1 each 1 (One) Time. Med Name: Allergy injections, Disp: , Rfl:     Physical Exam:  Vital Signs:   Vitals:    11/30/21 1515   BP: 122/82   BP Location: Right arm   Patient Position: Sitting   Pulse: 69   Resp: 18   SpO2: 98%   Weight: 74.4 kg (164 lb)   Height: 152.4 cm (60\")       Physical Exam  Constitutional:       General: She is not in acute distress.     Appearance: Normal appearance. She is well-developed. She is not diaphoretic.   HENT:      Head: Normocephalic and atraumatic.   Eyes:      General: No scleral icterus.     Pupils: Pupils are equal, round, and reactive to light.   Neck:      Trachea: No tracheal deviation.   Cardiovascular:      Rate and Rhythm: Normal rate and regular rhythm.      Heart sounds: Normal heart sounds. No murmur heard.  No friction rub. No gallop.       Comments: Normal JVD.  Pulmonary:      Effort: Pulmonary effort is normal. No respiratory distress.      Breath sounds: Normal breath sounds. No stridor. No wheezing or rales.   Chest:      Chest wall: No tenderness.   Abdominal:      General: Bowel sounds are normal. There is no " distension.      Palpations: Abdomen is soft.      Tenderness: There is no abdominal tenderness. There is no guarding or rebound.   Musculoskeletal:         General: No swelling. Normal range of motion.      Cervical back: Neck supple. No tenderness.   Lymphadenopathy:      Cervical: No cervical adenopathy.   Skin:     General: Skin is warm and dry.      Findings: No erythema.   Neurological:      General: No focal deficit present.      Mental Status: She is alert and oriented to person, place, and time.   Psychiatric:         Mood and Affect: Mood normal.         Behavior: Behavior normal.         Results Review:   I reviewed the patient's new clinical results.      Assessment / Plan:     1.     Palpitations   --Recently presented for evaluation of palpitations, of unclear etiology  --Outpatient cardiac monitoring showed no significant arrhythmias, rare ventricular ectopy which was asymptomatic  --Today, reports interval improvement in palpitations which are now rare and do not inhibit daily activities  --Given unremarkable cardiac monitor and an improvement in palpitations, no indication for further work-up at this time  --Continue metoprolol for suppression of ventricular ectopy    2.  Coronary artery disease  --Initially presented with anterior STEMI in 4/21, underwent PCI to the mid LAD, mild nonobstructive residual disease in the LAD and LCx  --Currently doing well without chest pain or exertional anginal symptoms  --Continue DAPT through 4/21, will plan to transition from aspirin/Brilinta to aspirin/Plavix as the patient's insurance will no longer cover Brilinta  --Continue high intensity statin  --Continue metoprolol and losartan  --Follow-up in 6 months, or sooner if required     2.  Hypertension  --Currently well controlled  --Continue current antihypertensives      Follow Up:   Return in about 6 months (around 5/30/2022).      Thank you for allowing me to participate in the care of your patient. Please to  not hesitate to contact me with additional questions or concerns.     LARISA Sawyer MD  Interventional Cardiology   11/30/2021  15:17 EST

## 2022-01-11 ENCOUNTER — OFFICE VISIT (OUTPATIENT)
Dept: OBSTETRICS AND GYNECOLOGY | Facility: CLINIC | Age: 57
End: 2022-01-11

## 2022-01-11 VITALS
HEIGHT: 60 IN | SYSTOLIC BLOOD PRESSURE: 118 MMHG | WEIGHT: 165.8 LBS | DIASTOLIC BLOOD PRESSURE: 76 MMHG | BODY MASS INDEX: 32.55 KG/M2

## 2022-01-11 DIAGNOSIS — N93.9 ABNORMAL UTERINE BLEEDING (AUB): Primary | ICD-10-CM

## 2022-01-11 PROCEDURE — 58100 BIOPSY OF UTERUS LINING: CPT | Performed by: OBSTETRICS & GYNECOLOGY

## 2022-01-11 PROCEDURE — 99213 OFFICE O/P EST LOW 20 MIN: CPT | Performed by: OBSTETRICS & GYNECOLOGY

## 2022-01-11 NOTE — PROGRESS NOTES
Chief Complaint   Patient presents with   • Follow-up     AUB/Fibroids        HPI  Sapna Ng is a 56 y.o. female, , who presents with recurrent evaluation of Abnormal Uterine Bleeding      Her last LMP was Patient's last menstrual period was 2021..  Periods are irregular occurring sporadically. Patient states that she had spotting in 2021.  She has not stopped spotting since that time.  She hasn't had a heavy period since 2021. She states that she went 120 days without bleeding a couple of times. Patient had labs at last appt on 10/11/2021. Her labs were consistent with perimenopause. Her TVUS done in [place] on 2021 showed multiple fibroids and ET 13.7. TVUS today shows three fibroids and ET 19.2.    She states she has experienced this problem for a year and a half She describes the severity as intermittent.  She states that the problem is worrisome.  The patient reports additional symptoms as none.  The patient has been evaluated: with TVUS, performed 2021, result The endometrial stripe of 13.7.  In the past the patient has not tried any medications or birth control.     Did the patient have u/s today? Yes.  Findings showed uterus with 3 stable fibroids endometrial stripe of 19.2 mm..  I have personally evaluated the U/S and agree with the findings. Tammi Muniz MD    Additional OB/GYN History   Last Pap :   Last Completed Pap Smear     This patient has no relevant Health Maintenance data.        History of abnormal Pap smear: yes - a few years ago per pt  Tobacco Usage?: No     The additional following portions of the patient's history were reviewed and updated as appropriate: allergies, current medications, past family history, past medical history, past social history, past surgical history and problem list.    Review of Systems   Constitutional: Negative.    HENT: Negative.    Eyes: Negative.    Respiratory: Negative.    Cardiovascular: Negative.   "  Gastrointestinal: Negative.    Endocrine: Negative.    Genitourinary: Positive for menstrual problem.   Musculoskeletal: Negative.    Skin: Negative.    Allergic/Immunologic: Negative.    Neurological: Negative.    Hematological: Negative.    Psychiatric/Behavioral: Negative.      All other systems reviewed and are negative.     I have reviewed and agree with the HPI, ROS, and historical information as entered above. Tammi Muniz MD    Objective   /76   Ht 152.4 cm (60\")   Wt 75.2 kg (165 lb 12.8 oz)   LMP 2021   BMI 32.38 kg/m²          Assessment and Plan    Endometrial Biopsy    Sapna Ng is a 56 y.o. female,   , whose last menstrual period was Patient's last menstrual period was 2021..  The patient has a history of dysfunctional uterine bleeding and presents for an endometrial biopsy. .  After the indications, risks, benefits, and alternatives to performing and endometrial biopsy were explained to the patient, she opted to continue with the endometrial biopsy.  A urine pregnancy test was not indicated.     PROCEDURE:  The patient was placed on the table in the supine lithotomy position.  She was draped in the appropriate manner.  A speculum was placed in the vagina.  The cervix was visualized and prepped with Betadine. A tenaculum was placed. A small plastic 5 mm Pipelle syringe curette was inserted into the cervical canal.  The uterus was sounded to 8 cms.  A vigorous four quadrant biopsy was performed, removing a moderate amount of tissue.  This tissue was placed in Formalin and sent to pathology.  The patient tolerated the procedure well and reported Mild cramping.  She had No cramping at the time of discharge.  Physical Exam  Vitals and nursing note reviewed. Exam conducted with a chaperone present.   Genitourinary:     General: Normal vulva.      Exam position: Lithotomy position.      Labia:         Right: No rash, tenderness or lesion.         Left: No rash, " tenderness or lesion.       Urethra: No urethral pain, urethral swelling or urethral lesion.      Vagina: Normal. No tenderness or lesions.      Cervix: No cervical motion tenderness, discharge, lesion or cervical bleeding.      Uterus: Normal. Not enlarged, not fixed and not tender.       Adnexa:         Right: No mass, tenderness or fullness.          Left: No mass, tenderness or fullness.        Rectum: No external hemorrhoid.      Comments: Chaperone Present        Endometrial Biopsy    Date/Time: 1/11/2022 12:03 PM  Performed by: Tammi Muniz MD  Authorized by: Tammi Muniz MD   Preparation: Patient was prepped and draped in the usual sterile fashion.  Local anesthesia used: no    Anesthesia:  Local anesthesia used: no    Sedation:  Patient sedated: no    Patient tolerance: patient tolerated the procedure well with no immediate complications              Plan:  Orders Placed This Encounter   Procedures   • Endometrial Biopsy     This order was created via procedure documentation     Order Specific Question:   Release to patient     Answer:   Immediate   • US Non-ob Transvaginal     Standing Status:   Future     Standing Expiration Date:   1/11/2023     Order Specific Question:   Reason for Exam:     Answer:   Abnormal uterine bleeding     Order Specific Question:   Release to patient     Answer:   Immediate       Problem List Items Addressed This Visit        Genitourinary and Reproductive     Abnormal uterine bleeding (AUB) - Primary    Overview     1/11/2022 difficult to tell if this is postmenopausal bleeding versus abnormal uterine bleeding.  Patient had labs in October that are consistent with menopause.  However she has never gone one full year without a period.  We will proceed with the question of this being postmenopausal bleeding.  Patient is on a blood thinner that she started in April after her heart attack.  She has had bleeding starting in December that has continued.  Her  endometrial stripe is 19 mm.  Endometrial biopsy performed today.  We will repeat ultrasound in 3 months.  We will determine treatment choice depending on endometrial biopsy results.         Relevant Orders    US Non-ob Transvaginal    Tissue Pathology Exam              Instructions  1. Call the office in 5 business days for biopsy results.  2. Patient instructed to call the office if develops a fever of 100.4 or greater, vaginal bleeding heavier than a period, foul vaginal discharge or pain.  Call for heavy bleeding  Follow Up: Return in about 3 months (around 4/11/2022) for ultrasound.    Tammi Muniz MD  01/11/2022

## 2022-01-12 ENCOUNTER — TELEPHONE (OUTPATIENT)
Dept: OBSTETRICS AND GYNECOLOGY | Facility: CLINIC | Age: 57
End: 2022-01-12

## 2022-01-12 RX ORDER — FLUCONAZOLE 150 MG/1
TABLET ORAL
Qty: 2 TABLET | Refills: 0 | Status: SHIPPED | OUTPATIENT
Start: 2022-01-12 | End: 2022-06-23

## 2022-01-14 DIAGNOSIS — N93.9 ABNORMAL UTERINE BLEEDING (AUB): ICD-10-CM

## 2022-01-17 DIAGNOSIS — N93.9 ABNORMAL UTERINE BLEEDING (AUB): Primary | ICD-10-CM

## 2022-01-17 RX ORDER — ACETAMINOPHEN AND CODEINE PHOSPHATE 120; 12 MG/5ML; MG/5ML
1 SOLUTION ORAL DAILY
Qty: 28 TABLET | Refills: 12 | Status: SHIPPED | OUTPATIENT
Start: 2022-01-17 | End: 2022-06-23

## 2022-01-17 NOTE — PROGRESS NOTES
Let the patient know that her endometrial biopsy came back as mildly proliferative but no sign of atypia or hyperplasia.  Plan for progestin only pills for the next 3 months to regulate any bleeding that she is having.  Will repeat ultrasound in 3 months.  Please make sure this is scheduled.

## 2022-01-26 ENCOUNTER — TELEPHONE (OUTPATIENT)
Dept: OBSTETRICS AND GYNECOLOGY | Facility: CLINIC | Age: 57
End: 2022-01-26

## 2022-01-26 NOTE — TELEPHONE ENCOUNTER
Patient called and was wondering if the medication charla sent in for her was correct. She wanted to know be 4 she started taking it

## 2022-01-26 NOTE — TELEPHONE ENCOUNTER
Reviewed past orders.    Patient expressed some confusion d/t the different brand naming. Confirmed generic name on the box she received. Encouraged ingestion of pill at the same time daily; verbalized understanding.

## 2022-02-21 ENCOUNTER — TELEPHONE (OUTPATIENT)
Dept: OBSTETRICS AND GYNECOLOGY | Facility: CLINIC | Age: 57
End: 2022-02-21

## 2022-02-21 NOTE — TELEPHONE ENCOUNTER
Dr. Muniz patient.    S/w pt she states she is currently taking progesterone and it has been making her more irritable, she is having heavier bleeding. Patient denies saturating one pad per hour or less, vaginal blood clots bigger than a golf size ball. Patient has f/u appt with AMAN on 4/18 and states she will wait until this appt and call if her s/s worsen

## 2022-02-21 NOTE — TELEPHONE ENCOUNTER
Patient called and stated that she would like to speak with nurse about bleeding, etc on new medications that she is on.

## 2022-03-14 DIAGNOSIS — E78.2 MIXED HYPERLIPIDEMIA: Primary | ICD-10-CM

## 2022-03-14 RX ORDER — ATORVASTATIN CALCIUM 80 MG/1
80 TABLET, FILM COATED ORAL NIGHTLY
Qty: 30 TABLET | Refills: 3 | Status: SHIPPED | OUTPATIENT
Start: 2022-03-14 | End: 2022-04-11 | Stop reason: SDUPTHER

## 2022-03-28 ENCOUNTER — TELEPHONE (OUTPATIENT)
Dept: CARDIOLOGY | Facility: CLINIC | Age: 57
End: 2022-03-28

## 2022-04-04 RX ORDER — CLOPIDOGREL BISULFATE 75 MG/1
75 TABLET ORAL DAILY
Qty: 38 TABLET | Refills: 0 | Status: SHIPPED | OUTPATIENT
Start: 2022-04-04 | End: 2022-05-24

## 2022-04-04 RX ORDER — CLOPIDOGREL BISULFATE 75 MG/1
75 TABLET ORAL DAILY
COMMUNITY
Start: 2022-04-04 | End: 2022-04-04 | Stop reason: SDUPTHER

## 2022-04-11 DIAGNOSIS — E78.2 MIXED HYPERLIPIDEMIA: ICD-10-CM

## 2022-04-11 RX ORDER — ATORVASTATIN CALCIUM 80 MG/1
80 TABLET, FILM COATED ORAL NIGHTLY
Qty: 90 TABLET | Refills: 3 | Status: SHIPPED | OUTPATIENT
Start: 2022-04-11 | End: 2022-11-10 | Stop reason: SDUPTHER

## 2022-04-18 ENCOUNTER — OFFICE VISIT (OUTPATIENT)
Dept: OBSTETRICS AND GYNECOLOGY | Facility: CLINIC | Age: 57
End: 2022-04-18

## 2022-04-18 VITALS
HEIGHT: 60 IN | WEIGHT: 171 LBS | BODY MASS INDEX: 33.57 KG/M2 | DIASTOLIC BLOOD PRESSURE: 78 MMHG | SYSTOLIC BLOOD PRESSURE: 125 MMHG

## 2022-04-18 DIAGNOSIS — N93.9 ABNORMAL UTERINE BLEEDING (AUB): Primary | ICD-10-CM

## 2022-04-18 PROCEDURE — 99214 OFFICE O/P EST MOD 30 MIN: CPT | Performed by: OBSTETRICS & GYNECOLOGY

## 2022-04-18 NOTE — PROGRESS NOTES
Chief Complaint   Patient presents with   • Follow-up     AUB, fibroids, U/S            HPI  Sapna Ng is a 56 y.o. female, , who presents for follow up evaluation of Abnormal Uterine Bleeding.    Her last LMP was Patient's last menstrual period was 2022 (within days). She reports she took the Micronor starting in 2022 but only took it for one month due to increase in heavier bleeding and blood clots bigger than a golf size ball.  Periods are irregular.  Since coming off the birth control, she has had some spotting for a few days at a time.  She has not kept up with how often it is.  She reports she has gone more than 2 weeks without bleeding.  She states she has experienced this problem for several months. She is just tired of dealing with this issue.  The patient reports additional symptoms as none.  The patient has been evaluated: with endometrial biopsy, performed on 22, result mildly proliferative but no sign of atypia or hyperplasia.  In the past the patient has tried progestin only pills .    Did the patient have u/s today? Yes.  Findings showeUterus anteverted and normal size.  Endometrial thickness 10.7 mm which is down from 19 when compared to 2022.  3 fibroids noted that are stable in size from ultrasound on 2022.  No free fluid noted.d.  I have personally evaluated the U/S and agree with the findings. Tammi Muniz MD    Additional OB/GYN History   Last Pap : 2021, with Dr. Diaz- normal per patient   Last Completed Pap Smear     This patient has no relevant Health Maintenance data.        History of abnormal Pap smear: yes - once years ago per patient, repeat mormal   Tobacco Usage?: No     The additional following portions of the patient's history were reviewed and updated as appropriate: allergies, current medications, past family history, past medical history, past social history, past surgical history and problem list.    Review of Systems  "  Constitutional: Negative.    HENT: Negative.    Eyes: Negative.    Respiratory: Negative.    Cardiovascular: Negative.    Gastrointestinal: Negative.    Endocrine: Negative.    Genitourinary: Positive for vaginal bleeding (AUB ).   Musculoskeletal: Negative.    Skin: Negative.    Allergic/Immunologic: Negative.    Neurological: Negative.    Hematological: Negative.    Psychiatric/Behavioral: Negative.      All other systems reviewed and are negative.     I have reviewed and agree with the HPI, ROS, and historical information as entered above. Tammi Muniz MD    Objective   /78   Ht 152.4 cm (60\")   Wt 77.6 kg (171 lb)   LMP 02/06/2022 (Within Days)   BMI 33.40 kg/m²     Physical Exam  Vitals and nursing note reviewed.   Constitutional:       Appearance: Normal appearance. She is well-developed.   HENT:      Head: Normocephalic and atraumatic.   Pulmonary:      Effort: Pulmonary effort is normal.      Breath sounds: Normal breath sounds.   Abdominal:      General: There is no distension.      Palpations: Abdomen is soft. Abdomen is not rigid. There is no mass.      Tenderness: There is no abdominal tenderness. There is no guarding or rebound.   Musculoskeletal:      Cervical back: Normal range of motion.   Skin:     General: Skin is warm and dry.   Neurological:      Mental Status: She is alert and oriented to person, place, and time.   Psychiatric:         Mood and Affect: Mood normal.         Behavior: Behavior normal.            Assessment and Plan    Problem List Items Addressed This Visit        Genitourinary and Reproductive     Abnormal uterine bleeding (AUB) - Primary    Overview     1/11/2022 difficult to tell if this is postmenopausal bleeding versus abnormal uterine bleeding.  Patient had labs in October that are consistent with menopause.  However she has never gone one full year without a period.Patient is on a blood thinner that she started in April 2021 after her heart attack.  She " has had bleeding starting in December that has continued into January.  On January 11, 2022, her endometrial stripe is 19 mm.  Endometrial biopsy performed -showed scant fragments of mildly proliferative endometrium.  Negative for hyperplasia or atypia.  Plan in January was to proceed with progestin only pills for 3 months.  Patient took for 1 month but was having increase in bleeding and was concerned it was related to her progestin only pill.  She came off.  Since that time she has had only spotting that she has not written down and cannot tell us how often it is.  She does feel like she has had more than 2 weeks between the spotting episodes.  We discussed surgical management on 4/18/2022.  Patient would not like to proceed with hysterectomy at this time.  Plan for reevaluation in June with an ultrasound.  We will draw CBC and menopausal labs today.  Patient to see cardiologist in May.  She is hoping to come off her blood thinners at that time and is hoping this is a contributing factor.  Will reassess with ultrasound and evaluation of patient in June.           Relevant Medications    norethindrone (MICRONOR) 0.35 MG tablet    Other Relevant Orders    Follicle Stimulating Hormone    Estradiol    CBC & Differential    US Non-ob Transvaginal          Lab(s) Ordered  Medication(s) Ordered  Schedule U/S for Eval  Call for heavy bleeding  Follow Up: Return in about 2 months (around 6/18/2022) for ultrasound.    Tammi Muniz MD  04/18/2022

## 2022-04-19 LAB
BASOPHILS # BLD AUTO: 0.05 10*3/MM3 (ref 0–0.2)
BASOPHILS NFR BLD AUTO: 0.8 % (ref 0–1.5)
EOSINOPHIL # BLD AUTO: 0.39 10*3/MM3 (ref 0–0.4)
EOSINOPHIL NFR BLD AUTO: 6.2 % (ref 0.3–6.2)
ERYTHROCYTE [DISTWIDTH] IN BLOOD BY AUTOMATED COUNT: 11.9 % (ref 12.3–15.4)
ESTRADIOL SERPL-MCNC: <5 PG/ML
FSH SERPL-ACNC: 55.9 MIU/ML
HCT VFR BLD AUTO: 40.2 % (ref 34–46.6)
HGB BLD-MCNC: 14.1 G/DL (ref 12–15.9)
IMM GRANULOCYTES # BLD AUTO: 0.02 10*3/MM3 (ref 0–0.05)
IMM GRANULOCYTES NFR BLD AUTO: 0.3 % (ref 0–0.5)
LYMPHOCYTES # BLD AUTO: 1.31 10*3/MM3 (ref 0.7–3.1)
LYMPHOCYTES NFR BLD AUTO: 21 % (ref 19.6–45.3)
MCH RBC QN AUTO: 32.2 PG (ref 26.6–33)
MCHC RBC AUTO-ENTMCNC: 35.1 G/DL (ref 31.5–35.7)
MCV RBC AUTO: 91.8 FL (ref 79–97)
MONOCYTES # BLD AUTO: 0.46 10*3/MM3 (ref 0.1–0.9)
MONOCYTES NFR BLD AUTO: 7.4 % (ref 5–12)
NEUTROPHILS # BLD AUTO: 4.02 10*3/MM3 (ref 1.7–7)
NEUTROPHILS NFR BLD AUTO: 64.3 % (ref 42.7–76)
NRBC BLD AUTO-RTO: 0 /100 WBC (ref 0–0.2)
PLATELET # BLD AUTO: 232 10*3/MM3 (ref 140–450)
RBC # BLD AUTO: 4.38 10*6/MM3 (ref 3.77–5.28)
WBC # BLD AUTO: 6.25 10*3/MM3 (ref 3.4–10.8)

## 2022-05-12 RX ORDER — LOSARTAN POTASSIUM 25 MG/1
25 TABLET ORAL DAILY
Qty: 30 TABLET | Refills: 3 | Status: SHIPPED | OUTPATIENT
Start: 2022-05-12 | End: 2022-08-03

## 2022-05-24 ENCOUNTER — OFFICE VISIT (OUTPATIENT)
Dept: CARDIOLOGY | Facility: CLINIC | Age: 57
End: 2022-05-24

## 2022-05-24 VITALS
HEIGHT: 60 IN | SYSTOLIC BLOOD PRESSURE: 138 MMHG | HEART RATE: 70 BPM | DIASTOLIC BLOOD PRESSURE: 84 MMHG | BODY MASS INDEX: 33.77 KG/M2 | WEIGHT: 172 LBS | OXYGEN SATURATION: 98 %

## 2022-05-24 DIAGNOSIS — I25.10 CORONARY ARTERIOSCLEROSIS: Primary | ICD-10-CM

## 2022-05-24 DIAGNOSIS — I10 PRIMARY HYPERTENSION: ICD-10-CM

## 2022-05-24 PROCEDURE — 99213 OFFICE O/P EST LOW 20 MIN: CPT | Performed by: INTERNAL MEDICINE

## 2022-05-24 RX ORDER — CARVEDILOL 3.12 MG/1
3.12 TABLET ORAL 2 TIMES DAILY
Qty: 180 TABLET | Refills: 3 | Status: SHIPPED | OUTPATIENT
Start: 2022-05-24 | End: 2022-11-10 | Stop reason: SDUPTHER

## 2022-06-20 ENCOUNTER — OFFICE VISIT (OUTPATIENT)
Dept: OBSTETRICS AND GYNECOLOGY | Facility: CLINIC | Age: 57
End: 2022-06-20

## 2022-06-20 VITALS
DIASTOLIC BLOOD PRESSURE: 72 MMHG | HEIGHT: 60 IN | SYSTOLIC BLOOD PRESSURE: 106 MMHG | WEIGHT: 168.2 LBS | BODY MASS INDEX: 33.02 KG/M2

## 2022-06-20 DIAGNOSIS — N93.9 ABNORMAL UTERINE BLEEDING (AUB): Primary | ICD-10-CM

## 2022-06-20 PROCEDURE — 99213 OFFICE O/P EST LOW 20 MIN: CPT | Performed by: OBSTETRICS & GYNECOLOGY

## 2022-06-20 NOTE — PROGRESS NOTES
Chief Complaint   Patient presents with   • Follow-up     AUB, Fibroids        Subjective   HPI  Sapna Ng is a 56 y.o. female, , who presents for AUB and fibroids .      She states she has experienced this problem for 4 months. Patient reports that 22 and 2022  heavier spotting for 2-3 days.    Her last LMP was Patient's last menstrual period was 2022 (approximate)..  Periods are irregular.  Dysmenorrhea:none.  Patient reports problems with: none.  Partner Status: Marital Status: .  New Partners since last visit: no.     Additional OB/GYN History   Current contraception: contraceptive methods: Tubal ligation  Desires to: do not start contraception  Last Pap :   Last Completed Pap Smear     This patient has no relevant Health Maintenance data.        History of abnormal Pap smear: yes - 2021 Dr. Quintanilla- Normal   Last mammogram:   Last Completed Mammogram          MAMMOGRAM (Every 2 Years) Next due on 8/3/2023    2021  Mammo Screening Digital Tomosynthesis Bilateral With CAD    2019  Mammo Screening Digital Tomosynthesis Bilateral With CAD    2018  Mammo Screening Digital Tomosynthesis Bilateral With CAD    2017  Mammo Screening Digital Tomosynthesis Bilateral With CAD    2015  Mammo screening bilateral    Only the first 5 history entries have been loaded, but more history exists.              Tobacco Usage?: No   OB History        3    Para   2    Term   2            AB   1    Living   2       SAB   1    IAB        Ectopic        Molar        Multiple        Live Births                    Health Maintenance   Topic Date Due   • Annual Gynecologic Pelvic and Breast Exam  Never done   • ANNUAL PHYSICAL  Never done   • COLORECTAL CANCER SCREENING  2014   • HEPATITIS C SCREENING  Never done   • PAP SMEAR  Never done   • COVID-19 Vaccine (3 - Booster for Pfizer series) 10/27/2021   • LIPID PANEL  2022   • INFLUENZA VACCINE  " 10/01/2022   • MAMMOGRAM  08/03/2023   • TDAP/TD VACCINES (2 - Td or Tdap) 12/28/2030   • ZOSTER VACCINE  Completed   • Pneumococcal Vaccine 0-64  Aged Out       The additional following portions of the patient's history were reviewed and updated as appropriate: allergies, current medications, past family history, past medical history, past social history and past surgical history.    Review of Systems   Constitutional: Negative.    HENT: Negative.    Respiratory: Negative.    Cardiovascular: Negative.    Gastrointestinal: Negative.    Genitourinary: Negative.    Musculoskeletal: Negative.    Skin: Negative.    Allergic/Immunologic: Negative.    Neurological: Negative.    Hematological: Negative.    Psychiatric/Behavioral: Negative.        I have reviewed and agree with the HPI, ROS, and historical information as entered above. Tammi Muniz MD    Objective   /72 (BP Location: Right arm, Patient Position: Sitting, Cuff Size: Adult)   Ht 152.4 cm (60\")   Wt 76.3 kg (168 lb 3.2 oz)   LMP 02/06/2022 (Approximate)   Breastfeeding No   BMI 32.85 kg/m²     Physical Exam  Vitals and nursing note reviewed.   Constitutional:       Appearance: Normal appearance. She is well-developed.   HENT:      Head: Normocephalic and atraumatic.   Pulmonary:      Effort: Pulmonary effort is normal.      Breath sounds: Normal breath sounds.   Abdominal:      General: There is no distension.      Palpations: Abdomen is soft. Abdomen is not rigid. There is no mass.      Tenderness: There is no abdominal tenderness. There is no guarding or rebound.   Musculoskeletal:      Cervical back: Normal range of motion.   Skin:     General: Skin is warm and dry.   Neurological:      Mental Status: She is alert and oriented to person, place, and time.   Psychiatric:         Mood and Affect: Mood normal.         Behavior: Behavior normal.         Assessment & Plan     Assessment     Problem List Items Addressed This Visit        " Genitourinary and Reproductive     Abnormal uterine bleeding (AUB) - Primary    Overview     1/11/2022 difficult to tell if this is postmenopausal bleeding versus abnormal uterine bleeding.  Patient had labs in October that are consistent with menopause.  However she has never gone one full year without a period.Patient is on a blood thinner that she started in April 2021 after her heart attack.  She has had bleeding starting in December that has continued into January.  On January 11, 2022, her endometrial stripe is 19 mm.  Endometrial biopsy performed -showed scant fragments of mildly proliferative endometrium.  Negative for hyperplasia or atypia.  Plan in January was to proceed with progestin only pills for 3 months.  Patient took for 1 month but was having increase in bleeding and was concerned it was related to her progestin only pill.  She came off.  Since that time she has had only spotting that she has not written down and cannot tell us how often it is.  She does feel like she has had more than 2 weeks between the spotting episodes.  We discussed surgical management on 4/18/2022.  Patient would not like to proceed with hysterectomy at this time.  Plan for reevaluation in June with an ultrasound.  We will draw hemoglobin 14.8 on 4/18/2022.  Labs on this day also are consistent with menopause..  Patient to see cardiologist in May.  She is hoping to come off her blood thinners at that time and is hoping this is a contributing factor.  Will reassess with ultrasound and evaluation of patient in June.  6/20/2022-ultrasound shows endometrial thickness of 10.8.  There are 3 fibroids.  One is submucosal and unchanged in size.  Ultrasound within normal limits otherwise.  Patient came off blood thinners a few weeks ago and is still have small spotting daily.  She reports proximately 7 days without bleeding in the past 2 months.  She reports most days it is just 1 spot of blood.  She is only had to wear a pad once.  We  discussed sampling.  Patient does not want hysteroscopy D&C nor endometrial sampling today.  She does not want hysterectomy at this time.  She would like to wait a few more months and see if that regulates.  We will have her come back in 2 months for an ultrasound and endometrial biopsy.  We discussed that we cannot determine if this is a cancerous tissue without sampling or removal.  Patient understands this risk and would like to continue to watch for now.           Relevant Medications    norethindrone (MICRONOR) 0.35 MG tablet    Other Relevant Orders    US Non-ob Transvaginal        Plan     Return in about 2 months (around 8/20/2022) for ultrasound, endometrial biopsy.    25 minutes spent reviewing case, ultrasound, labs, discussing plan of care and action with patient.    Tammi Muniz MD  06/20/2022

## 2022-06-22 ENCOUNTER — TELEPHONE (OUTPATIENT)
Dept: SLEEP MEDICINE | Facility: HOSPITAL | Age: 57
End: 2022-06-22

## 2022-06-23 ENCOUNTER — TELEMEDICINE (OUTPATIENT)
Dept: SLEEP MEDICINE | Facility: HOSPITAL | Age: 57
End: 2022-06-23

## 2022-06-23 VITALS — HEIGHT: 60 IN | WEIGHT: 167 LBS | BODY MASS INDEX: 32.79 KG/M2

## 2022-06-23 DIAGNOSIS — G47.33 OSA (OBSTRUCTIVE SLEEP APNEA): Primary | ICD-10-CM

## 2022-06-23 PROCEDURE — 99213 OFFICE O/P EST LOW 20 MIN: CPT | Performed by: NURSE PRACTITIONER

## 2022-06-23 NOTE — PROGRESS NOTES
Chief Complaint:   Chief Complaint   Patient presents with   • Follow-up       HPI:    Sapna Ng is a 56 y.o. female here for follow-up of sleep apnea.  Patient continues to do well with CPAP therapy.  Patient was last seen 6/22/2021.  She is sleeping 7+ hours nightly and does feel rested upon awakening.  She will go to sleep within 5 minutes and does get up 1-2 times patient has an [place] score of 3/24.  Patient does feel CPAP is beneficial.  She has no concerns or complaints and will continue therapy.        Current medications are:   Current Outpatient Medications:   •  aspirin 81 MG EC tablet, Take 1 tablet by mouth Daily., Disp: 180 tablet, Rfl: 3  •  atorvastatin (LIPITOR) 80 MG tablet, Take 1 tablet by mouth Every Night., Disp: 90 tablet, Rfl: 3  •  carvedilol (COREG) 3.125 MG tablet, Take 1 tablet by mouth 2 (Two) Times a Day., Disp: 180 tablet, Rfl: 3  •  cetirizine (zyrTEC) 10 MG tablet, Take 10 mg by mouth every night at bedtime., Disp: , Rfl:   •  Cholecalciferol (Vitamin D3) 50 MCG (2000 UT) capsule, Daily., Disp: , Rfl:   •  ferrous sulfate 140 (45 Fe) MG tablet controlled-release tablet, Take 140 mg by mouth Daily With Breakfast., Disp: , Rfl:   •  losartan (COZAAR) 25 MG tablet, TAKE 1 TABLET BY MOUTH DAILY., Disp: 30 tablet, Rfl: 3  •  montelukast (SINGULAIR) 10 MG tablet, Take 10 mg by mouth Every Night., Disp: , Rfl:   •  nitroglycerin (NITROSTAT) 0.4 MG SL tablet, nitroglycerin 0.4 mg sublingual tablet  1 UNDER THE TONGUE AS NEEDED FOR ANGINA, MAY REPEAT EVERY 5 MINS FOR UP THREE DOSES, Disp: , Rfl:   •  Unable to find, 1 each 1 (One) Time. Med Name: Allergy injections, Disp: , Rfl: .      The patient's relevant past medical, surgical, family and social history were reviewed and updated in Epic as appropriate.       Review of Systems   Eyes: Positive for visual disturbance.   Respiratory: Positive for apnea.    Allergic/Immunologic: Positive for environmental allergies.    Psychiatric/Behavioral: Positive for sleep disturbance.   All other systems reviewed and are negative.        Objective:    Physical Exam  Constitutional:       Appearance: Normal appearance.   HENT:      Head: Normocephalic and atraumatic.   Neurological:      Mental Status: She is alert and oriented to person, place, and time.   Psychiatric:         Mood and Affect: Mood normal.         Behavior: Behavior normal.         Thought Content: Thought content normal.         Judgment: Judgment normal.       89/90 days of use  Greater than 4-hour use 98.9  90% pressure 9.6  AHI 0.5  Settings 8-20    ASSESSMENT/PLAN    Diagnoses and all orders for this visit:    1. DORITA (obstructive sleep apnea) (Primary)  -     PAP Therapy            1. Counseled patient regarding multimodal approach with healthy nutrition, healthy sleep, regular physical activity, social activities, counseling, and medications. Encouraged to practice lateral sleep position. Avoid alcohol and sedatives close to bedtime.  2. You have chosen to receive care through a telehealth visit.  Do you consent to use a video/audio connection for your medical care today? Yes  3.   Refill supplies today 1 year see patient back in 1 year  I have reviewed the results of my evaluation and impression and discussed my recommendations in detail with the patient.      Signed by  WESTLEY Wong    June 23, 2022      CC: Letty Diaz MD          No ref. provider found

## 2022-08-03 RX ORDER — LOSARTAN POTASSIUM 25 MG/1
25 TABLET ORAL DAILY
Qty: 30 TABLET | Refills: 3 | Status: SHIPPED | OUTPATIENT
Start: 2022-08-03 | End: 2022-08-30

## 2022-08-25 ENCOUNTER — OFFICE VISIT (OUTPATIENT)
Dept: OBSTETRICS AND GYNECOLOGY | Facility: CLINIC | Age: 57
End: 2022-08-25

## 2022-08-25 VITALS — DIASTOLIC BLOOD PRESSURE: 68 MMHG | BODY MASS INDEX: 33.01 KG/M2 | SYSTOLIC BLOOD PRESSURE: 110 MMHG | WEIGHT: 169 LBS

## 2022-08-25 DIAGNOSIS — N93.9 ABNORMAL UTERINE BLEEDING (AUB): Primary | ICD-10-CM

## 2022-08-25 PROCEDURE — 99214 OFFICE O/P EST MOD 30 MIN: CPT | Performed by: OBSTETRICS & GYNECOLOGY

## 2022-08-25 PROCEDURE — 58100 BIOPSY OF UTERUS LINING: CPT | Performed by: OBSTETRICS & GYNECOLOGY

## 2022-08-25 RX ORDER — UBIDECARENONE 100 MG
100 CAPSULE ORAL DAILY
COMMUNITY

## 2022-08-25 NOTE — PROGRESS NOTES
"      Chief Complaint   Patient presents with   • Follow-up   • endometrial biopsy            HPI  Sapna Ng is a 57 y.o. female, , who presents for follow up evaluation of persistent postmenopausal bleeding.      Her last LMP was unknown as she has never been 1 year without vaginal bleeding. Her labs have shown c/w postmenopause multiple times since this became an issue.    Sapna Ng reports since her last visit she did not have any bleeding for a few weeks. She started bleeding again 22 with a \"heavier spotting\" then spotted on and off until last week when she had another episode of \"heavier spotting\". Her last day of bleeding was yesterday.  The patient has been evaluated for PMB in the past. See below. Patient reports she had stopped her blood thinners in . However, she is still taking a baby aspirin daily. The patient reports additional symptoms as none.     Did the patient have u/s today? US done today: Yes.  Findings showed thickened endometrium at 12.3mm. STable fibroids. .  I have personally evaluated the U/S and agree with the findings. Tammi Muniz MD      Additional OB/GYN History   Last Pap: > 1 year ago aprx. 2021 with PCP       Last Completed Pap Smear     This patient has no relevant Health Maintenance data.            The additional following portions of the patient's history were reviewed and updated as appropriate: allergies, current medications, past family history, past medical history, past social history, past surgical history and problem list.    Review of Systems   Constitutional: Negative.    HENT: Negative.    Eyes: Negative.    Respiratory: Negative.    Cardiovascular: Negative.    Gastrointestinal: Negative.    Endocrine: Negative.    Genitourinary: Positive for vaginal bleeding.   Musculoskeletal: Negative.    Skin: Negative.    Allergic/Immunologic: Negative.    Neurological: Negative.    Hematological: Negative.    Psychiatric/Behavioral: " Negative.      All other systems reviewed and are negative.     I have reviewed and agree with the HPI, ROS, and historical information as entered above. Tammi Muniz MD    Objective   /68   Wt 76.7 kg (169 lb)   BMI 33.01 kg/m²     Physical Exam  Vitals and nursing note reviewed. Exam conducted with a chaperone present.   Genitourinary:     General: Normal vulva.      Exam position: Lithotomy position.      Labia:         Right: No rash, tenderness or lesion.         Left: No rash, tenderness or lesion.       Urethra: No urethral pain, urethral swelling or urethral lesion.      Vagina: Normal. No tenderness or lesions.      Cervix: No cervical motion tenderness, discharge, lesion or cervical bleeding.      Uterus: Normal. Not enlarged, not fixed and not tender.       Adnexa:         Right: No mass, tenderness or fullness.          Left: No mass, tenderness or fullness.        Rectum: No external hemorrhoid.      Comments: Chaperone Present           Assessment and Plan    Endometrial Biopsy    Date/Time: 8/25/2022 3:44 PM  Performed by: Tammi Muniz MD  Authorized by: Tammi Muniz MD     Consent:     Consent obtained: verbal    Consent given by: patient    Risks discussed: bleeding and infection  Indications:     Indications: abnormal uterine bleeding      Chronicity of post-menopausal bleeding: recurrent  Pre-procedure:     Urine pregnancy test: N/A    Procedure:     A biannual exam was performed: no      Prepped with: Betadine    Tenaculum used: yes      A local block was performed: no      Cervix dilated: no      Number of passes: 2  Findings:     Cervix: normal      Uterus depth by sound (cm): 7    Specimen collected: specimen collected and sent to pathology          After the indications, risks, benefits, and alternatives to performing and endometrial biopsy were explained to the patient, opted to move forward with the endometrial biopsy. A urine pregnancy was not  done    PROCEDURE:  The patient was placed on the table in the supine lithotomy position.  She was draped in the appropriate manner.  A speculum was placed in the vagina.  The cervix was visualized and prepped with Betadine. A tenaculum was placed. A small plastic 5 mm Pipelle syringe curette was inserted into the cervical canal.  The uterus was sounded to 7 cms.  A vigorous four quadrant biopsy was performed, removing a small amount of tissue.  This tissue was placed in Formalin and sent to pathology.  The patient tolerated the procedure well and reported Mild cramping.  She had No cramping at the time of discharge.    Problem List Items Addressed This Visit        Genitourinary and Reproductive     Abnormal uterine bleeding (AUB) - Primary    Overview     1/11/2022 difficult to tell if this is postmenopausal bleeding versus abnormal uterine bleeding.  Patient had labs in October that are consistent with menopause.  However she has never gone one full year without a period.Patient is on a blood thinner that she started in April 2021 after her heart attack.  She has had bleeding starting in December that has continued into January.  On January 11, 2022, her endometrial stripe is 19 mm.  Endometrial biopsy performed -showed scant fragments of mildly proliferative endometrium.  Negative for hyperplasia or atypia.  Plan in January was to proceed with progestin only pills for 3 months.  Patient took for 1 month but was having increase in bleeding and was concerned it was related to her progestin only pill.  She came off.  Since that time she has had only spotting that she has not written down and cannot tell us how often it is.  She does feel like she has had more than 2 weeks between the spotting episodes.  We discussed surgical management on 4/18/2022.  Patient would not like to proceed with hysterectomy at this time.  Plan for reevaluation in June with an ultrasound.  We will draw hemoglobin 14.8 on 4/18/2022.  Labs  on this day also are consistent with menopause..  Patient to see cardiologist in May.  She is hoping to come off her blood thinners at that time and is hoping this is a contributing factor.  Will reassess with ultrasound and evaluation of patient in June.  6/20/2022-ultrasound shows endometrial thickness of 10.8.  There are 3 fibroids.  One is submucosal and unchanged in size.  Ultrasound within normal limits otherwise.  Patient came off blood thinners a few weeks ago and is still have small spotting daily.  She reports proximately 7 days without bleeding in the past 2 months.  She reports most days it is just 1 spot of blood.  She is only had to wear a pad once.  We discussed sampling.  Patient does not want hysteroscopy D&C nor endometrial sampling today.  She does not want hysterectomy at this time.  She would like to wait a few more months and see if that regulates.  We will have her come back in 2 months for an ultrasound and endometrial biopsy.  We discussed that we cannot determine if this is a cancerous tissue without sampling or removal.  Patient understands this risk and would like to continue to watch for now.  8/25/2022-ultrasound shows thickness increased to 12.3 mm.  Stable fibroids.  Strongly recommended surgical intervention to this patient.  She agreed for total laparoscopic hysterectomy, bilateral salpingo-oophorectomy.  Patient with a history of heart attack last year.  She is to get cardiac clearance by her cardiologist Dr. Sawyer in [place].  We reviewed therapy options and the risks and benefits of surgery including but not limited to bleeding, infection, pain, injury to bowel, bladder, other organs.  We also discussed risks of anesthesia including death.         Relevant Orders    TISSUE EXAM, [facility] ([facility])    Case Request (Completed)          Follow Up: Return in about 25 days (around 9/19/2022) for Preop.  1. Call the office in 5 business days for biopsy results.  2. Patient  instructed to call the office if develops a fever of 100.4 or greater, vaginal bleeding heavier than a period, foul vaginal discharge or pain.  35 minutes spent with patient regarding review of case, labs, ultrasound, treatment options, and surgical plan.  Tammi Muniz MD  08/25/2022

## 2022-08-26 ENCOUNTER — TELEPHONE (OUTPATIENT)
Dept: CARDIOLOGY | Facility: CLINIC | Age: 57
End: 2022-08-26

## 2022-08-26 LAB — REF LAB TEST METHOD: NORMAL

## 2022-08-30 RX ORDER — LOSARTAN POTASSIUM 25 MG/1
25 TABLET ORAL DAILY
Qty: 30 TABLET | Refills: 3 | Status: SHIPPED | OUTPATIENT
Start: 2022-08-30 | End: 2022-11-10 | Stop reason: SDUPTHER

## 2022-09-19 ENCOUNTER — PREP FOR SURGERY (OUTPATIENT)
Dept: OTHER | Facility: HOSPITAL | Age: 57
End: 2022-09-19

## 2022-09-19 ENCOUNTER — PRE-ADMISSION TESTING (OUTPATIENT)
Dept: PREADMISSION TESTING | Facility: HOSPITAL | Age: 57
End: 2022-09-19

## 2022-09-19 ENCOUNTER — OFFICE VISIT (OUTPATIENT)
Dept: OBSTETRICS AND GYNECOLOGY | Facility: CLINIC | Age: 57
End: 2022-09-19

## 2022-09-19 VITALS — HEIGHT: 60 IN | WEIGHT: 167.77 LBS | BODY MASS INDEX: 32.94 KG/M2

## 2022-09-19 VITALS
BODY MASS INDEX: 32.98 KG/M2 | DIASTOLIC BLOOD PRESSURE: 80 MMHG | SYSTOLIC BLOOD PRESSURE: 126 MMHG | HEIGHT: 60 IN | WEIGHT: 168 LBS

## 2022-09-19 DIAGNOSIS — N93.9 ABNORMAL UTERINE BLEEDING (AUB): ICD-10-CM

## 2022-09-19 DIAGNOSIS — N93.9 ABNORMAL UTERINE BLEEDING (AUB): Primary | ICD-10-CM

## 2022-09-19 DIAGNOSIS — Z01.818 PRE-OP TESTING: Primary | ICD-10-CM

## 2022-09-19 LAB
ABO GROUP BLD: NORMAL
ALBUMIN SERPL-MCNC: 4.7 G/DL (ref 3.5–5.2)
ALBUMIN/GLOB SERPL: 1.6 G/DL
ALP SERPL-CCNC: 114 U/L (ref 39–117)
ALT SERPL W P-5'-P-CCNC: 42 U/L (ref 1–33)
ANION GAP SERPL CALCULATED.3IONS-SCNC: 8 MMOL/L (ref 5–15)
AST SERPL-CCNC: 26 U/L (ref 1–32)
BACTERIA UR QL AUTO: ABNORMAL /HPF
BASOPHILS # BLD AUTO: 0.06 10*3/MM3 (ref 0–0.2)
BASOPHILS NFR BLD AUTO: 0.9 % (ref 0–1.5)
BILIRUB SERPL-MCNC: 0.5 MG/DL (ref 0–1.2)
BILIRUB UR QL STRIP: NEGATIVE
BLD GP AB SCN SERPL QL: NEGATIVE
BUN SERPL-MCNC: 13 MG/DL (ref 6–20)
BUN/CREAT SERPL: 17.1 (ref 7–25)
CALCIUM SPEC-SCNC: 9.6 MG/DL (ref 8.6–10.5)
CHLORIDE SERPL-SCNC: 105 MMOL/L (ref 98–107)
CLARITY UR: CLEAR
CO2 SERPL-SCNC: 27 MMOL/L (ref 22–29)
COLOR UR: YELLOW
CREAT SERPL-MCNC: 0.76 MG/DL (ref 0.57–1)
DEPRECATED RDW RBC AUTO: 35.5 FL (ref 37–54)
EGFRCR SERPLBLD CKD-EPI 2021: 91 ML/MIN/1.73
EOSINOPHIL # BLD AUTO: 0.3 10*3/MM3 (ref 0–0.4)
EOSINOPHIL NFR BLD AUTO: 4.5 % (ref 0.3–6.2)
ERYTHROCYTE [DISTWIDTH] IN BLOOD BY AUTOMATED COUNT: 11 % (ref 12.3–15.4)
GLOBULIN UR ELPH-MCNC: 2.9 GM/DL
GLUCOSE SERPL-MCNC: 85 MG/DL (ref 65–99)
GLUCOSE UR STRIP-MCNC: NEGATIVE MG/DL
HCT VFR BLD AUTO: 43.8 % (ref 34–46.6)
HGB BLD-MCNC: 15.1 G/DL (ref 12–15.9)
HGB UR QL STRIP.AUTO: ABNORMAL
HYALINE CASTS UR QL AUTO: ABNORMAL /LPF
IMM GRANULOCYTES # BLD AUTO: 0.02 10*3/MM3 (ref 0–0.05)
IMM GRANULOCYTES NFR BLD AUTO: 0.3 % (ref 0–0.5)
KETONES UR QL STRIP: NEGATIVE
LEUKOCYTE ESTERASE UR QL STRIP.AUTO: ABNORMAL
LYMPHOCYTES # BLD AUTO: 1.74 10*3/MM3 (ref 0.7–3.1)
LYMPHOCYTES NFR BLD AUTO: 26.1 % (ref 19.6–45.3)
MCH RBC QN AUTO: 30.7 PG (ref 26.6–33)
MCHC RBC AUTO-ENTMCNC: 34.5 G/DL (ref 31.5–35.7)
MCV RBC AUTO: 89 FL (ref 79–97)
MONOCYTES # BLD AUTO: 0.87 10*3/MM3 (ref 0.1–0.9)
MONOCYTES NFR BLD AUTO: 13 % (ref 5–12)
NEUTROPHILS NFR BLD AUTO: 3.68 10*3/MM3 (ref 1.7–7)
NEUTROPHILS NFR BLD AUTO: 55.2 % (ref 42.7–76)
NITRITE UR QL STRIP: NEGATIVE
NRBC BLD AUTO-RTO: 0 /100 WBC (ref 0–0.2)
PH UR STRIP.AUTO: 5.5 [PH] (ref 5–8)
PLATELET # BLD AUTO: 234 10*3/MM3 (ref 140–450)
PMV BLD AUTO: 11.3 FL (ref 6–12)
POTASSIUM SERPL-SCNC: 4.3 MMOL/L (ref 3.5–5.2)
PROT SERPL-MCNC: 7.6 G/DL (ref 6–8.5)
PROT UR QL STRIP: NEGATIVE
QT INTERVAL: 410 MS
QTC INTERVAL: 395 MS
RBC # BLD AUTO: 4.92 10*6/MM3 (ref 3.77–5.28)
RBC # UR STRIP: ABNORMAL /HPF
REF LAB TEST METHOD: ABNORMAL
RH BLD: POSITIVE
SODIUM SERPL-SCNC: 140 MMOL/L (ref 136–145)
SP GR UR STRIP: 1.01 (ref 1–1.03)
SQUAMOUS #/AREA URNS HPF: ABNORMAL /HPF
T&S EXPIRATION DATE: NORMAL
UROBILINOGEN UR QL STRIP: ABNORMAL
WBC # UR STRIP: ABNORMAL /HPF
WBC NRBC COR # BLD: 6.67 10*3/MM3 (ref 3.4–10.8)

## 2022-09-19 PROCEDURE — 86900 BLOOD TYPING SEROLOGIC ABO: CPT

## 2022-09-19 PROCEDURE — 93005 ELECTROCARDIOGRAM TRACING: CPT

## 2022-09-19 PROCEDURE — 36415 COLL VENOUS BLD VENIPUNCTURE: CPT

## 2022-09-19 PROCEDURE — 86850 RBC ANTIBODY SCREEN: CPT

## 2022-09-19 PROCEDURE — 86901 BLOOD TYPING SEROLOGIC RH(D): CPT

## 2022-09-19 PROCEDURE — 87086 URINE CULTURE/COLONY COUNT: CPT

## 2022-09-19 PROCEDURE — 93010 ELECTROCARDIOGRAM REPORT: CPT | Performed by: STUDENT IN AN ORGANIZED HEALTH CARE EDUCATION/TRAINING PROGRAM

## 2022-09-19 PROCEDURE — 85025 COMPLETE CBC W/AUTO DIFF WBC: CPT

## 2022-09-19 PROCEDURE — 80053 COMPREHEN METABOLIC PANEL: CPT

## 2022-09-19 PROCEDURE — 87186 SC STD MICRODIL/AGAR DIL: CPT

## 2022-09-19 PROCEDURE — 81001 URINALYSIS AUTO W/SCOPE: CPT

## 2022-09-19 PROCEDURE — 87088 URINE BACTERIA CULTURE: CPT

## 2022-09-19 PROCEDURE — 99213 OFFICE O/P EST LOW 20 MIN: CPT | Performed by: OBSTETRICS & GYNECOLOGY

## 2022-09-19 RX ORDER — CEFAZOLIN SODIUM 2 G/100ML
2 INJECTION, SOLUTION INTRAVENOUS ONCE
Status: CANCELLED | OUTPATIENT
Start: 2022-09-19 | End: 2022-09-19

## 2022-09-19 RX ORDER — SODIUM CHLORIDE 0.9 % (FLUSH) 0.9 %
3 SYRINGE (ML) INJECTION EVERY 12 HOURS SCHEDULED
Status: CANCELLED | OUTPATIENT
Start: 2022-09-19

## 2022-09-19 RX ORDER — ACETAMINOPHEN 500 MG
1000 TABLET ORAL ONCE
Status: CANCELLED | OUTPATIENT
Start: 2022-09-19 | End: 2022-09-19

## 2022-09-19 RX ORDER — SODIUM CHLORIDE 0.9 % (FLUSH) 0.9 %
10 SYRINGE (ML) INJECTION AS NEEDED
Status: CANCELLED | OUTPATIENT
Start: 2022-09-19

## 2022-09-19 RX ORDER — GABAPENTIN 300 MG/1
600 CAPSULE ORAL ONCE
Status: CANCELLED | OUTPATIENT
Start: 2022-09-19 | End: 2022-09-19

## 2022-09-19 RX ORDER — SCOLOPAMINE TRANSDERMAL SYSTEM 1 MG/1
1 PATCH, EXTENDED RELEASE TRANSDERMAL CONTINUOUS
Status: CANCELLED | OUTPATIENT
Start: 2022-09-19 | End: 2022-09-22

## 2022-09-19 NOTE — PAT
Patient to apply Chlorhexadine wipes  to surgical area (as instructed) the night before procedure and the AM of procedure. Wipes provided.    Patient instructed to drink 20 ounces of Gatorade and it needs to be completed 1 hour (for Main OR patients) or 2 hours (scheduled  section & [facility] patients) before given arrival time for procedure (NO RED Gatorade)    Patient verbalized understanding.    Per Anesthesia Request, patient instructed not to take their ACE/ARB medications on the AM of surgery.    An arrival time for procedure was not provided during PAT visit. If patient had any questions or concerns about their arrival time, they were instructed to contact their surgeon/physician.  Additionally, if the patient referred to an arrival time that was acquired from their my chart account, patient was encouraged to verify that time with their surgeon/physician. Arrival times are NOT provided in Pre Admission Testing Department.    Instructed patient to take two Tylenol extra strength (total of 1000 mg) the night before surgery.    Patient did not review general PAT education video as instructed in their preoperative information received from their surgeon.  One-on-one Pre Admission Testing general education provided during PAT visit.  Copies of PAT general education handouts (Incentive Spirometry, Meds to Beds Program, Patient Belongings, Pre-op skin preparation instructions, Blood Glucose testing, Visitor policy, Surgery FAQ, Code H) distributed to patient. Encouraged patient/family to read PAT general education handouts thoroughly and notify PAT staff with any questions or concerns. Patient instructed to bring PAT pass and completed skin prep sheet (if applicable) on the day of procedure. Patient verbalized understanding of all information and priority content.     Cardiac clearance dated 22 from Dr Sawyer in [place]; per Dr Muniz, patient was not to take aspirin 22 or 22.

## 2022-09-19 NOTE — PROGRESS NOTES
Gynecologic Preoperative Exam Note         Subjective   Sapna Ng is a 57 y.o. year old  who is scheduled for TVH with bilateral salpingo-oophorectomy  at [facility] on 22 at 1400.  Pre Admission testing has been scheduled for today at [facility]. Her pre operative diagnosis is DUB. She had preop clearance from her cardiologist. Patient's last menstrual period was 2022.. Her birth control method is bilateral tubal ligation.  Her BMI is Body mass index is 32.81 kg/m²..        She has reviewed the informational pamphlet on hysterectomy.    She understands the risks of bleeding, infection, possible damage to other organ systems, including but not limited to the gastrointestinal tract and genitourinary tract.  She also understands the specific risks listed in the preop information (video, pamphlets, etc.).    She has reviewed and signed the preop consent form.    She has been instructed to have a light dinner the night before surgery, then nothing to eat or drink after midnight.  The day of surgery do not chew gum or smoke.  Remove all jewelry, nail polish, contact lenses prior to coming to the hospital.  Do not bring valuables or large sums of money with you. Patient was instructed on what time to arrive and where to check in, maps were given.  She was instructed that she will meet an Anesthesiologist and that an IV will be started to provide fluids and sedation.  The total time of procedure was discussed.  She was instructed that she will need a .      Allergies   Allergen Reactions   • Penicillins Itching     She has confirmed that she is not allergic to Latex.     She is on the following medications. These were reviewed with the patient today and instructed on which medications are ok to take with a sip of water prior to the surgery.      Current Outpatient Medications:   •  aspirin 81 MG EC tablet, Take 1 tablet by mouth Daily., Disp: 180 tablet, Rfl: 3  •   atorvastatin (LIPITOR) 80 MG tablet, Take 1 tablet by mouth Every Night., Disp: 90 tablet, Rfl: 3  •  carvedilol (COREG) 3.125 MG tablet, Take 1 tablet by mouth 2 (Two) Times a Day., Disp: 180 tablet, Rfl: 3  •  cetirizine (zyrTEC) 10 MG tablet, Take 10 mg by mouth every night at bedtime., Disp: , Rfl:   •  Cholecalciferol (Vitamin D3) 50 MCG (2000 UT) capsule, Daily., Disp: , Rfl:   •  coenzyme Q10 100 MG capsule, Take 100 mg by mouth Daily., Disp: , Rfl:   •  ferrous sulfate 140 (45 Fe) MG tablet controlled-release tablet, Take 140 mg by mouth Daily With Breakfast., Disp: , Rfl:   •  losartan (COZAAR) 25 MG tablet, TAKE 1 TABLET BY MOUTH DAILY., Disp: 30 tablet, Rfl: 3  •  montelukast (SINGULAIR) 10 MG tablet, Take 10 mg by mouth Every Night., Disp: , Rfl:   •  nitroglycerin (NITROSTAT) 0.4 MG SL tablet, nitroglycerin 0.4 mg sublingual tablet  1 UNDER THE TONGUE AS NEEDED FOR ANGINA, MAY REPEAT EVERY 5 MINS FOR UP THREE DOSES, Disp: , Rfl:   •  Unable to find, 1 each 1 (One) Time. 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   • Coronary artery disease    • Fibroid 1997 discovered    Still have them   • Hyperlipidemia Several years ago, it runs a little high or the high end of normal.   • Hypertension A few years ago I started taking Losartin.   • Myocardial infarction (HCC)    • PPD positive, treated    • Tuberculosis 1990 or 1991    Tested positive with skin test, took INH for 1 year     Past Surgical History:   Procedure Laterality Date   • HEAD & NECK SKIN LESION EXCISIONAL BIOPSY  2020    BACK OF THE HEAD   • MOHS SURGERY  08/24/2020    BACK OF HEAD   • CARDIAC CATHETERIZATION N/A 04/22/2021    Procedure: Left Heart Cath;  Surgeon: Franklin Baig MD;  [facility];  Service: Cardiology;  Laterality:  N/A;   • CARDIAC CATHETERIZATION N/A 2021    Procedure: Coronary angiography;  Surgeon: Franklin Baig MD;  Location: [facility] CATH INVASIVE LOCATION;  Service: Cardiology;  Laterality: N/A;   • CARDIAC CATHETERIZATION N/A 2021    Procedure: Stent CLARISSA coronary;  Surgeon: Franklin Baig MD;  Location: [facility] CATH INVASIVE LOCATION;  Service: Cardiology;  Laterality: N/A;   • CARDIAC CATHETERIZATION N/A 2021    Procedure: Optical Coherent Tomography;  Surgeon: Franklin Baig MD;  Location: [facility] CATH INVASIVE LOCATION;  Service: Cardiology;  Laterality: N/A;   • CORONARY STENT PLACEMENT     • LAPAROSCOPIC TUBAL LIGATION     • TUBAL ABDOMINAL LIGATION  2001   • WISDOM TOOTH EXTRACTION  Dec. 1981     OB History    Para Term  AB Living   3 2 2 0 1 2   SAB IAB Ectopic Molar Multiple Live Births   1 0 0 0 0 0      # Outcome Date GA Lbr Jr/2nd Weight Sex Delivery Anes PTL Lv   3 SAB            2 Term            1 Term              Social History     Tobacco Use   Smoking Status Never Smoker   Smokeless Tobacco Never Used   Tobacco Comment    Both of my parents and most of the adults in my  extended family smoked when I was a child.     Social History     Substance and Sexual Activity   Alcohol Use Yes    Comment: I may have an alcoholic beverage once a month.     Social History     Substance and Sexual Activity   Drug Use Never         Review of Systems   Constitutional: Negative.    HENT: Negative.    Eyes: Negative.    Respiratory: Negative.    Cardiovascular: Negative.    Gastrointestinal: Negative.    Endocrine: Negative.    Genitourinary: Positive for menstrual problem and vaginal bleeding.   Musculoskeletal: Negative.    Skin: Negative.    Allergic/Immunologic: Negative.    Neurological: Negative.    Hematological: Negative.    Psychiatric/Behavioral: Negative.            Objective    Vitals:    22 1319   BP: 126/80         Physical Exam  Vitals and nursing note  reviewed.   Constitutional:       Appearance: Normal appearance. She is well-developed.   HENT:      Head: Normocephalic and atraumatic.   Pulmonary:      Effort: Pulmonary effort is normal.      Breath sounds: Normal breath sounds.   Abdominal:      General: There is no distension.      Palpations: Abdomen is soft. Abdomen is not rigid. There is no mass.      Tenderness: There is no abdominal tenderness. There is no guarding or rebound.   Musculoskeletal:      Cervical back: Normal range of motion.   Skin:     General: Skin is warm and dry.   Neurological:      Mental Status: She is alert and oriented to person, place, and time.   Psychiatric:         Mood and Affect: Mood normal.         Behavior: Behavior normal.         Assessment   Problem List Items Addressed This Visit        Genitourinary and Reproductive     Abnormal uterine bleeding (AUB) - Primary    Overview     1/11/2022 difficult to tell if this is postmenopausal bleeding versus abnormal uterine bleeding.  Patient had labs in October that are consistent with menopause.  However she has never gone one full year without a period.Patient is on a blood thinner that she started in April 2021 after her heart attack.  She has had bleeding starting in December that has continued into January.  On January 11, 2022, her endometrial stripe is 19 mm.  Endometrial biopsy performed -showed scant fragments of mildly proliferative endometrium.  Negative for hyperplasia or atypia.  Plan in January was to proceed with progestin only pills for 3 months.  Patient took for 1 month but was having increase in bleeding and was concerned it was related to her progestin only pill.  She came off.  Since that time she has had only spotting that she has not written down and cannot tell us how often it is.  She does feel like she has had more than 2 weeks between the spotting episodes.  We discussed surgical management on 4/18/2022.  Patient would not like to proceed with  hysterectomy at this time.  Plan for reevaluation in June with an ultrasound.  We will draw hemoglobin 14.8 on 4/18/2022.  Labs on this day also are consistent with menopause..  Patient to see cardiologist in May.  She is hoping to come off her blood thinners at that time and is hoping this is a contributing factor.  Will reassess with ultrasound and evaluation of patient in June.  6/20/2022-ultrasound shows endometrial thickness of 10.8.  There are 3 fibroids.  One is submucosal and unchanged in size.  Ultrasound within normal limits otherwise.  Patient came off blood thinners a few weeks ago and is still have small spotting daily.  She reports proximately 7 days without bleeding in the past 2 months.  She reports most days it is just 1 spot of blood.  She is only had to wear a pad once.  We discussed sampling.  Patient does not want hysteroscopy D&C nor endometrial sampling today.  She does not want hysterectomy at this time.  She would like to wait a few more months and see if that regulates.  We will have her come back in 2 months for an ultrasound and endometrial biopsy.  We discussed that we cannot determine if this is a cancerous tissue without sampling or removal.  Patient understands this risk and would like to continue to watch for now.  8/25/2022-ultrasound shows thickness increased to 12.3 mm.  Endometrial biopsy on 8/25/2022 was within normal.  Stable fibroids.  Strongly recommended surgical intervention to this patient.  She agreed for total laparoscopic hysterectomy, bilateral salpingo-oophorectomy.  Patient with a history of heart attack last year.  She is to get cardiac clearance by her cardiologist Dr. Sawyer in [place].  We reviewed therapy options and the risks and benefits of surgery including but not limited to bleeding, infection, pain, injury to bowel, bladder, other organs.  We also discussed risks of anesthesia including death.                        Plan   1. Total laparoscopic  hysterectomy/bilateral salpingo-oophorectomy  2. Advised to stop baby aspirin as soon as possible  3. Letter from cardiologist for clearance is in the letters section of epic.  4. Risks of surgery were reviewed with the patient including risks of bleeding, infection, damage to other organ systems including, but not limited to GI and  tracts (bowel, bladder, blood vessels, nerves) risks of Anesthesia, as well as the risk the surgery will not produce the desired results, possible need for additional surgery, death, risk of uterine perforation.  5. PAT Scheduled    6. Conrado has been obtained and reviewed   7. Pain Medication Consent Form has been signed.  A review regarding proper medication administration, impact on driving and working while medicated, the safety of use in pregnancy, the potential for overdose and the proper disposal and storage of controlled medications has been done with the patient.          Tammi Muniz MD  9/19/2022

## 2022-09-20 ENCOUNTER — ANESTHESIA EVENT (OUTPATIENT)
Dept: PERIOP | Facility: HOSPITAL | Age: 57
End: 2022-09-20

## 2022-09-20 RX ORDER — FAMOTIDINE 10 MG/ML
20 INJECTION, SOLUTION INTRAVENOUS ONCE
Status: CANCELLED | OUTPATIENT
Start: 2022-09-20 | End: 2022-09-20

## 2022-09-21 ENCOUNTER — HOSPITAL ENCOUNTER (OUTPATIENT)
Facility: HOSPITAL | Age: 57
Discharge: HOME | End: 2022-09-22
Attending: OBSTETRICS & GYNECOLOGY | Admitting: OBSTETRICS & GYNECOLOGY

## 2022-09-21 ENCOUNTER — ANESTHESIA (OUTPATIENT)
Dept: PERIOP | Facility: HOSPITAL | Age: 57
End: 2022-09-21

## 2022-09-21 DIAGNOSIS — Z90.710 S/P LAPAROSCOPIC HYSTERECTOMY: Primary | ICD-10-CM

## 2022-09-21 DIAGNOSIS — N93.9 ABNORMAL UTERINE BLEEDING (AUB): ICD-10-CM

## 2022-09-21 LAB
ABO GROUP BLD: NORMAL
BACTERIA SPEC AEROBE CULT: ABNORMAL
RH BLD: POSITIVE

## 2022-09-21 PROCEDURE — 25010000002 NEOSTIGMINE 10 MG/10ML SOLUTION: Performed by: NURSE ANESTHETIST, CERTIFIED REGISTERED

## 2022-09-21 PROCEDURE — 86901 BLOOD TYPING SEROLOGIC RH(D): CPT

## 2022-09-21 PROCEDURE — 25010000002 MIDAZOLAM PER 1 MG: Performed by: ANESTHESIOLOGY

## 2022-09-21 PROCEDURE — 25010000002 ONDANSETRON PER 1 MG: Performed by: NURSE ANESTHETIST, CERTIFIED REGISTERED

## 2022-09-21 PROCEDURE — S0260 H&P FOR SURGERY: HCPCS | Performed by: OBSTETRICS & GYNECOLOGY

## 2022-09-21 PROCEDURE — G0378 HOSPITAL OBSERVATION PER HR: HCPCS

## 2022-09-21 PROCEDURE — 58571 TLH W/T/O 250 G OR LESS: CPT | Performed by: OBSTETRICS & GYNECOLOGY

## 2022-09-21 PROCEDURE — C1765 ADHESION BARRIER: HCPCS | Performed by: OBSTETRICS & GYNECOLOGY

## 2022-09-21 PROCEDURE — 25010000002 FENTANYL CITRATE (PF) 50 MCG/ML SOLUTION

## 2022-09-21 PROCEDURE — 25010000002 CEFAZOLIN IN DEXTROSE 2-4 GM/100ML-% SOLUTION: Performed by: OBSTETRICS & GYNECOLOGY

## 2022-09-21 PROCEDURE — 88307 TISSUE EXAM BY PATHOLOGIST: CPT | Performed by: OBSTETRICS & GYNECOLOGY

## 2022-09-21 PROCEDURE — 86900 BLOOD TYPING SEROLOGIC ABO: CPT

## 2022-09-21 PROCEDURE — 25010000002 DEXAMETHASONE PER 1 MG: Performed by: NURSE ANESTHETIST, CERTIFIED REGISTERED

## 2022-09-21 PROCEDURE — 25010000002 FENTANYL CITRATE (PF) 50 MCG/ML SOLUTION: Performed by: NURSE ANESTHETIST, CERTIFIED REGISTERED

## 2022-09-21 PROCEDURE — 94660 CPAP INITIATION&MGMT: CPT

## 2022-09-21 PROCEDURE — 25010000002 PROPOFOL 10 MG/ML EMULSION: Performed by: NURSE ANESTHETIST, CERTIFIED REGISTERED

## 2022-09-21 PROCEDURE — 94799 UNLISTED PULMONARY SVC/PX: CPT

## 2022-09-21 DEVICE — ABSORBABLE ADHESION BARRIER
Type: IMPLANTABLE DEVICE | Site: ABDOMEN | Status: FUNCTIONAL
Brand: GYNECARE INTERCEED

## 2022-09-21 RX ORDER — IBUPROFEN 600 MG/1
600 TABLET ORAL EVERY 6 HOURS SCHEDULED
Status: DISCONTINUED | OUTPATIENT
Start: 2022-09-21 | End: 2022-09-22 | Stop reason: HOSPADM

## 2022-09-21 RX ORDER — ROCURONIUM BROMIDE 10 MG/ML
INJECTION, SOLUTION INTRAVENOUS AS NEEDED
Status: DISCONTINUED | OUTPATIENT
Start: 2022-09-21 | End: 2022-09-21 | Stop reason: SURG

## 2022-09-21 RX ORDER — OXYCODONE AND ACETAMINOPHEN 10; 325 MG/1; MG/1
1 TABLET ORAL EVERY 4 HOURS PRN
Status: DISCONTINUED | OUTPATIENT
Start: 2022-09-21 | End: 2022-09-22 | Stop reason: HOSPADM

## 2022-09-21 RX ORDER — DROPERIDOL 2.5 MG/ML
0.62 INJECTION, SOLUTION INTRAMUSCULAR; INTRAVENOUS
Status: DISCONTINUED | OUTPATIENT
Start: 2022-09-21 | End: 2022-09-21 | Stop reason: HOSPADM

## 2022-09-21 RX ORDER — CEFAZOLIN SODIUM 2 G/100ML
2 INJECTION, SOLUTION INTRAVENOUS ONCE
Status: COMPLETED | OUTPATIENT
Start: 2022-09-21 | End: 2022-09-21

## 2022-09-21 RX ORDER — PROMETHAZINE HYDROCHLORIDE 25 MG/1
25 TABLET ORAL ONCE AS NEEDED
Status: DISCONTINUED | OUTPATIENT
Start: 2022-09-21 | End: 2022-09-21 | Stop reason: HOSPADM

## 2022-09-21 RX ORDER — PROPOFOL 10 MG/ML
VIAL (ML) INTRAVENOUS AS NEEDED
Status: DISCONTINUED | OUTPATIENT
Start: 2022-09-21 | End: 2022-09-21 | Stop reason: SURG

## 2022-09-21 RX ORDER — LIDOCAINE HYDROCHLORIDE 10 MG/ML
0.5 INJECTION, SOLUTION EPIDURAL; INFILTRATION; INTRACAUDAL; PERINEURAL ONCE AS NEEDED
Status: COMPLETED | OUTPATIENT
Start: 2022-09-21 | End: 2022-09-21

## 2022-09-21 RX ORDER — SODIUM CHLORIDE 0.9 % (FLUSH) 0.9 %
10 SYRINGE (ML) INJECTION AS NEEDED
Status: DISCONTINUED | OUTPATIENT
Start: 2022-09-21 | End: 2022-09-21 | Stop reason: HOSPADM

## 2022-09-21 RX ORDER — SODIUM CHLORIDE 0.9 % (FLUSH) 0.9 %
3-10 SYRINGE (ML) INJECTION AS NEEDED
Status: DISCONTINUED | OUTPATIENT
Start: 2022-09-21 | End: 2022-09-21 | Stop reason: HOSPADM

## 2022-09-21 RX ORDER — FAMOTIDINE 20 MG/1
20 TABLET, FILM COATED ORAL ONCE
Status: COMPLETED | OUTPATIENT
Start: 2022-09-21 | End: 2022-09-21

## 2022-09-21 RX ORDER — SODIUM CHLORIDE 0.9 % (FLUSH) 0.9 %
10 SYRINGE (ML) INJECTION EVERY 12 HOURS SCHEDULED
Status: DISCONTINUED | OUTPATIENT
Start: 2022-09-21 | End: 2022-09-21 | Stop reason: HOSPADM

## 2022-09-21 RX ORDER — SODIUM CHLORIDE, SODIUM LACTATE, POTASSIUM CHLORIDE, CALCIUM CHLORIDE 600; 310; 30; 20 MG/100ML; MG/100ML; MG/100ML; MG/100ML
9 INJECTION, SOLUTION INTRAVENOUS CONTINUOUS
Status: DISCONTINUED | OUTPATIENT
Start: 2022-09-21 | End: 2022-09-21

## 2022-09-21 RX ORDER — OXYCODONE HYDROCHLORIDE 5 MG/1
5 TABLET ORAL EVERY 4 HOURS PRN
Status: DISCONTINUED | OUTPATIENT
Start: 2022-09-21 | End: 2022-09-22 | Stop reason: HOSPADM

## 2022-09-21 RX ORDER — SODIUM CHLORIDE 0.9 % (FLUSH) 0.9 %
3 SYRINGE (ML) INJECTION EVERY 12 HOURS SCHEDULED
Status: DISCONTINUED | OUTPATIENT
Start: 2022-09-21 | End: 2022-09-21 | Stop reason: HOSPADM

## 2022-09-21 RX ORDER — SODIUM CHLORIDE 9 MG/ML
100 INJECTION, SOLUTION INTRAVENOUS CONTINUOUS
Status: DISCONTINUED | OUTPATIENT
Start: 2022-09-21 | End: 2022-09-22 | Stop reason: HOSPADM

## 2022-09-21 RX ORDER — NALOXONE HCL 0.4 MG/ML
0.1 VIAL (ML) INJECTION
Status: DISCONTINUED | OUTPATIENT
Start: 2022-09-21 | End: 2022-09-22 | Stop reason: HOSPADM

## 2022-09-21 RX ORDER — NEOSTIGMINE METHYLSULFATE 1 MG/ML
INJECTION, SOLUTION INTRAVENOUS AS NEEDED
Status: DISCONTINUED | OUTPATIENT
Start: 2022-09-21 | End: 2022-09-21 | Stop reason: SURG

## 2022-09-21 RX ORDER — BUPIVACAINE HYDROCHLORIDE AND EPINEPHRINE 5; 5 MG/ML; UG/ML
INJECTION, SOLUTION PERINEURAL AS NEEDED
Status: DISCONTINUED | OUTPATIENT
Start: 2022-09-21 | End: 2022-09-21 | Stop reason: HOSPADM

## 2022-09-21 RX ORDER — CARVEDILOL 3.12 MG/1
3.12 TABLET ORAL 2 TIMES DAILY
Status: DISCONTINUED | OUTPATIENT
Start: 2022-09-21 | End: 2022-09-22 | Stop reason: HOSPADM

## 2022-09-21 RX ORDER — MEPERIDINE HYDROCHLORIDE 25 MG/ML
12.5 INJECTION INTRAMUSCULAR; INTRAVENOUS; SUBCUTANEOUS
Status: DISCONTINUED | OUTPATIENT
Start: 2022-09-21 | End: 2022-09-21 | Stop reason: HOSPADM

## 2022-09-21 RX ORDER — HYDRALAZINE HYDROCHLORIDE 20 MG/ML
5 INJECTION INTRAMUSCULAR; INTRAVENOUS
Status: DISCONTINUED | OUTPATIENT
Start: 2022-09-21 | End: 2022-09-21 | Stop reason: HOSPADM

## 2022-09-21 RX ORDER — HYDROCODONE BITARTRATE AND ACETAMINOPHEN 5; 325 MG/1; MG/1
1 TABLET ORAL ONCE AS NEEDED
Status: DISCONTINUED | OUTPATIENT
Start: 2022-09-21 | End: 2022-09-21 | Stop reason: HOSPADM

## 2022-09-21 RX ORDER — IPRATROPIUM BROMIDE AND ALBUTEROL SULFATE 2.5; .5 MG/3ML; MG/3ML
3 SOLUTION RESPIRATORY (INHALATION) ONCE AS NEEDED
Status: DISCONTINUED | OUTPATIENT
Start: 2022-09-21 | End: 2022-09-21 | Stop reason: HOSPADM

## 2022-09-21 RX ORDER — ONDANSETRON 2 MG/ML
4 INJECTION INTRAMUSCULAR; INTRAVENOUS ONCE AS NEEDED
Status: DISCONTINUED | OUTPATIENT
Start: 2022-09-21 | End: 2022-09-21 | Stop reason: HOSPADM

## 2022-09-21 RX ORDER — MAGNESIUM HYDROXIDE 1200 MG/15ML
LIQUID ORAL AS NEEDED
Status: DISCONTINUED | OUTPATIENT
Start: 2022-09-21 | End: 2022-09-21 | Stop reason: HOSPADM

## 2022-09-21 RX ORDER — NITROFURANTOIN 25; 75 MG/1; MG/1
100 CAPSULE ORAL EVERY 12 HOURS SCHEDULED
Status: DISCONTINUED | OUTPATIENT
Start: 2022-09-21 | End: 2022-09-22 | Stop reason: HOSPADM

## 2022-09-21 RX ORDER — SODIUM CHLORIDE 9 MG/ML
INJECTION, SOLUTION INTRAVENOUS AS NEEDED
Status: DISCONTINUED | OUTPATIENT
Start: 2022-09-21 | End: 2022-09-21 | Stop reason: HOSPADM

## 2022-09-21 RX ORDER — ONDANSETRON 4 MG/1
4 TABLET, FILM COATED ORAL EVERY 6 HOURS PRN
Status: DISCONTINUED | OUTPATIENT
Start: 2022-09-21 | End: 2022-09-22 | Stop reason: HOSPADM

## 2022-09-21 RX ORDER — GLYCOPYRROLATE 0.2 MG/ML
INJECTION INTRAMUSCULAR; INTRAVENOUS AS NEEDED
Status: DISCONTINUED | OUTPATIENT
Start: 2022-09-21 | End: 2022-09-21 | Stop reason: SURG

## 2022-09-21 RX ORDER — ONDANSETRON 2 MG/ML
INJECTION INTRAMUSCULAR; INTRAVENOUS AS NEEDED
Status: DISCONTINUED | OUTPATIENT
Start: 2022-09-21 | End: 2022-09-21 | Stop reason: SURG

## 2022-09-21 RX ORDER — MIDAZOLAM HYDROCHLORIDE 1 MG/ML
1 INJECTION INTRAMUSCULAR; INTRAVENOUS
Status: DISCONTINUED | OUTPATIENT
Start: 2022-09-21 | End: 2022-09-21 | Stop reason: HOSPADM

## 2022-09-21 RX ORDER — LABETALOL HYDROCHLORIDE 5 MG/ML
5 INJECTION, SOLUTION INTRAVENOUS
Status: DISCONTINUED | OUTPATIENT
Start: 2022-09-21 | End: 2022-09-21 | Stop reason: HOSPADM

## 2022-09-21 RX ORDER — DOCUSATE SODIUM 100 MG/1
100 CAPSULE, LIQUID FILLED ORAL 2 TIMES DAILY
Status: DISCONTINUED | OUTPATIENT
Start: 2022-09-21 | End: 2022-09-22 | Stop reason: HOSPADM

## 2022-09-21 RX ORDER — ACETAMINOPHEN 500 MG
1000 TABLET ORAL ONCE
Status: COMPLETED | OUTPATIENT
Start: 2022-09-21 | End: 2022-09-21

## 2022-09-21 RX ORDER — DROPERIDOL 2.5 MG/ML
0.62 INJECTION, SOLUTION INTRAMUSCULAR; INTRAVENOUS ONCE AS NEEDED
Status: DISCONTINUED | OUTPATIENT
Start: 2022-09-21 | End: 2022-09-21 | Stop reason: HOSPADM

## 2022-09-21 RX ORDER — HYDROMORPHONE HYDROCHLORIDE 1 MG/ML
0.5 INJECTION, SOLUTION INTRAMUSCULAR; INTRAVENOUS; SUBCUTANEOUS
Status: DISCONTINUED | OUTPATIENT
Start: 2022-09-21 | End: 2022-09-21 | Stop reason: HOSPADM

## 2022-09-21 RX ORDER — PROPOFOL 10 MG/ML
VIAL (ML) INTRAVENOUS AS NEEDED
Status: DISCONTINUED | OUTPATIENT
Start: 2022-09-21 | End: 2022-09-21

## 2022-09-21 RX ORDER — NALOXONE HCL 0.4 MG/ML
0.4 VIAL (ML) INJECTION AS NEEDED
Status: DISCONTINUED | OUTPATIENT
Start: 2022-09-21 | End: 2022-09-21 | Stop reason: HOSPADM

## 2022-09-21 RX ORDER — FENTANYL CITRATE 50 UG/ML
INJECTION, SOLUTION INTRAMUSCULAR; INTRAVENOUS
Status: COMPLETED
Start: 2022-09-21 | End: 2022-09-21

## 2022-09-21 RX ORDER — GABAPENTIN 100 MG/1
100 CAPSULE ORAL 3 TIMES DAILY
Status: DISCONTINUED | OUTPATIENT
Start: 2022-09-21 | End: 2022-09-22 | Stop reason: HOSPADM

## 2022-09-21 RX ORDER — SCOLOPAMINE TRANSDERMAL SYSTEM 1 MG/1
1 PATCH, EXTENDED RELEASE TRANSDERMAL CONTINUOUS
Status: DISCONTINUED | OUTPATIENT
Start: 2022-09-21 | End: 2022-09-21

## 2022-09-21 RX ORDER — FENTANYL CITRATE 50 UG/ML
INJECTION, SOLUTION INTRAMUSCULAR; INTRAVENOUS AS NEEDED
Status: DISCONTINUED | OUTPATIENT
Start: 2022-09-21 | End: 2022-09-21 | Stop reason: SURG

## 2022-09-21 RX ORDER — HYDROMORPHONE HYDROCHLORIDE 1 MG/ML
0.5 INJECTION, SOLUTION INTRAMUSCULAR; INTRAVENOUS; SUBCUTANEOUS
Status: DISCONTINUED | OUTPATIENT
Start: 2022-09-21 | End: 2022-09-22 | Stop reason: HOSPADM

## 2022-09-21 RX ORDER — LIDOCAINE HYDROCHLORIDE 10 MG/ML
INJECTION, SOLUTION EPIDURAL; INFILTRATION; INTRACAUDAL; PERINEURAL AS NEEDED
Status: DISCONTINUED | OUTPATIENT
Start: 2022-09-21 | End: 2022-09-21 | Stop reason: SURG

## 2022-09-21 RX ORDER — DEXAMETHASONE SODIUM PHOSPHATE 4 MG/ML
INJECTION, SOLUTION INTRA-ARTICULAR; INTRALESIONAL; INTRAMUSCULAR; INTRAVENOUS; SOFT TISSUE AS NEEDED
Status: DISCONTINUED | OUTPATIENT
Start: 2022-09-21 | End: 2022-09-21 | Stop reason: SURG

## 2022-09-21 RX ORDER — FENTANYL CITRATE 50 UG/ML
50 INJECTION, SOLUTION INTRAMUSCULAR; INTRAVENOUS
Status: DISCONTINUED | OUTPATIENT
Start: 2022-09-21 | End: 2022-09-21 | Stop reason: HOSPADM

## 2022-09-21 RX ORDER — PROMETHAZINE HYDROCHLORIDE 25 MG/1
25 SUPPOSITORY RECTAL ONCE AS NEEDED
Status: DISCONTINUED | OUTPATIENT
Start: 2022-09-21 | End: 2022-09-21 | Stop reason: HOSPADM

## 2022-09-21 RX ORDER — ONDANSETRON 2 MG/ML
4 INJECTION INTRAMUSCULAR; INTRAVENOUS EVERY 6 HOURS PRN
Status: DISCONTINUED | OUTPATIENT
Start: 2022-09-21 | End: 2022-09-22 | Stop reason: HOSPADM

## 2022-09-21 RX ORDER — GABAPENTIN 300 MG/1
600 CAPSULE ORAL ONCE
Status: COMPLETED | OUTPATIENT
Start: 2022-09-21 | End: 2022-09-21

## 2022-09-21 RX ADMIN — ROCURONIUM BROMIDE 50 MG: 50 INJECTION, SOLUTION INTRAVENOUS at 14:32

## 2022-09-21 RX ADMIN — DOCUSATE SODIUM 100 MG: 100 CAPSULE, LIQUID FILLED ORAL at 20:20

## 2022-09-21 RX ADMIN — CEFAZOLIN SODIUM 2 G: 2 INJECTION, SOLUTION INTRAVENOUS at 14:31

## 2022-09-21 RX ADMIN — FENTANYL CITRATE 50 MCG: 50 INJECTION, SOLUTION INTRAMUSCULAR; INTRAVENOUS at 15:01

## 2022-09-21 RX ADMIN — MIDAZOLAM HYDROCHLORIDE 2 MG: 1 INJECTION, SOLUTION INTRAMUSCULAR; INTRAVENOUS at 14:28

## 2022-09-21 RX ADMIN — SODIUM CHLORIDE 100 ML/HR: 9 INJECTION, SOLUTION INTRAVENOUS at 18:47

## 2022-09-21 RX ADMIN — CARVEDILOL 3.12 MG: 3.12 TABLET, FILM COATED ORAL at 20:20

## 2022-09-21 RX ADMIN — PROPOFOL 25 MCG/KG/MIN: 10 INJECTION, EMULSION INTRAVENOUS at 14:32

## 2022-09-21 RX ADMIN — FAMOTIDINE 20 MG: 20 TABLET ORAL at 12:43

## 2022-09-21 RX ADMIN — NITROFURANTOIN (MONOHYDRATE/MACROCRYSTALS) 100 MG: 75; 25 CAPSULE ORAL at 20:20

## 2022-09-21 RX ADMIN — FENTANYL CITRATE 50 MCG: 50 INJECTION INTRAMUSCULAR; INTRAVENOUS at 16:37

## 2022-09-21 RX ADMIN — GLYCOPYRROLATE 0.6 MG: 0.2 INJECTION INTRAMUSCULAR; INTRAVENOUS at 15:45

## 2022-09-21 RX ADMIN — DEXAMETHASONE SODIUM PHOSPHATE 8 MG: 4 INJECTION, SOLUTION INTRA-ARTICULAR; INTRALESIONAL; INTRAMUSCULAR; INTRAVENOUS; SOFT TISSUE at 14:36

## 2022-09-21 RX ADMIN — ACETAMINOPHEN 1000 MG: 500 TABLET, FILM COATED ORAL at 12:43

## 2022-09-21 RX ADMIN — PROPOFOL 250 MG: 10 INJECTION, EMULSION INTRAVENOUS at 14:31

## 2022-09-21 RX ADMIN — SCOPALAMINE 1 PATCH: 1 PATCH, EXTENDED RELEASE TRANSDERMAL at 12:42

## 2022-09-21 RX ADMIN — FENTANYL CITRATE 50 MCG: 50 INJECTION, SOLUTION INTRAMUSCULAR; INTRAVENOUS at 16:37

## 2022-09-21 RX ADMIN — GABAPENTIN 600 MG: 300 CAPSULE ORAL at 12:43

## 2022-09-21 RX ADMIN — FENTANYL CITRATE 50 MCG: 50 INJECTION, SOLUTION INTRAMUSCULAR; INTRAVENOUS at 14:31

## 2022-09-21 RX ADMIN — LIDOCAINE HYDROCHLORIDE 0.5 ML: 10 INJECTION, SOLUTION EPIDURAL; INFILTRATION; INTRACAUDAL; PERINEURAL at 12:42

## 2022-09-21 RX ADMIN — ONDANSETRON 4 MG: 2 INJECTION INTRAMUSCULAR; INTRAVENOUS at 14:28

## 2022-09-21 RX ADMIN — LIDOCAINE HYDROCHLORIDE 100 MG: 10 INJECTION, SOLUTION EPIDURAL; INFILTRATION; INTRACAUDAL; PERINEURAL at 14:31

## 2022-09-21 RX ADMIN — GABAPENTIN 100 MG: 100 CAPSULE ORAL at 20:20

## 2022-09-21 RX ADMIN — NEOSTIGMINE METHYLSULFATE 3 MG: 0.5 INJECTION INTRAVENOUS at 15:45

## 2022-09-21 RX ADMIN — SODIUM CHLORIDE, POTASSIUM CHLORIDE, SODIUM LACTATE AND CALCIUM CHLORIDE: 600; 310; 30; 20 INJECTION, SOLUTION INTRAVENOUS at 15:57

## 2022-09-21 RX ADMIN — SODIUM CHLORIDE, POTASSIUM CHLORIDE, SODIUM LACTATE AND CALCIUM CHLORIDE 9 ML/HR: 600; 310; 30; 20 INJECTION, SOLUTION INTRAVENOUS at 12:42

## 2022-09-21 RX ADMIN — IBUPROFEN 600 MG: 600 TABLET ORAL at 18:51

## 2022-09-21 NOTE — ANESTHESIA PREPROCEDURE EVALUATION
Anesthesia Evaluation     Patient summary reviewed and Nursing notes reviewed                Airway   Mallampati: I  TM distance: >3 FB  Neck ROM: full  No difficulty expected  Dental - normal exam     Pulmonary - normal exam   (+) sleep apnea on CPAP,   Cardiovascular - normal exam    (+) hypertension, CAD, cardiac stents hyperlipidemia,     ROS comment:   Echo 4/21: normal EF, no significant valve disease    LHC 4/21: stent to LAD, mild nonobstructive residual disease in the LAD and LCx    Neuro/Psych- negative ROS  GI/Hepatic/Renal/Endo    (+) obesity,       Musculoskeletal (-) negative ROS    Abdominal  - normal exam    Bowel sounds: normal.   Substance History - negative use     OB/GYN negative ob/gyn ROS         Other                        Anesthesia Plan    ASA 3     general     intravenous induction     Anesthetic plan, risks, benefits, and alternatives have been provided, discussed and informed consent has been obtained with: patient.    Plan discussed with CRNA.        CODE STATUS:

## 2022-09-21 NOTE — H&P
Patient Care Team:  Letty Diaz MD as PCP - General  Letty Diaz MD as PCP - Family Medicine  [facility] Phys[facility] (Obstetrics and Gynecology)  Lenard Sawyer MD as Consulting Physician (Cardiology)    Chief complaint abnormal uterine bleeding, fibroid uterus    Subjective     Patient is a 57 y.o. female presents with abnormal uterine bleeding over the past year.  Patient has had lab work is consistent with menopause, however she is never gone 1 full year without having a period.  She noted in January bleeding that was continuous for a bit.  She came in and endometrial biopsy was performed that was within normal limits.  There was no sign of hyperplasia or uterine cancer.  She felt that her bleeding was related to being on a blood thinner from her heart attack in April 2021.  She wished to wait at this time and was put on progestin pills.  She did not take the progestin pills but a month and stopped.  On reevaluation she was still having some abnormal bleeding.  Endometrial thickness was thickened if she was postmenopausal.  She declined hysterectomy or hysteroscopy for evaluation.  She came off her blood thinners in April 2022 and her abnormal bleeding continued.  Reevaluation with endometrial biopsy still showed no hyperplasia or uterine cancer, however her stripe had thickened once again.  She elected for definitive therapy in total laparoscopic hysterectomy, bilateral salpingo-oophorectomy.  Her cardiologist cleared her for surgery.    Review of Systems   All systems were reviewed and negative except for:  Genitourinary: postivie for  Abnormal uterine bleeding.    History  Past Medical History:   Diagnosis Date   • Anemia A few years ago, have been taking iron since.   • Coronary artery disease 2021    s/p MI; LAD blockage-stent insertion   • Environmental allergies     allergy shots every two weeks   • Fibroid 1997 discovered    Still have them   • History of COVID-19  2021    no hospitalization   • Hyperlipidemia Several years ago, it runs a little high or the high end of normal.   • Hypertension A few years ago I started taking Losartin.   • Myocardial infarction (HCC) 2021    LAD blockage; s/.p stent   • PPD positive, treated    • Sleep apnea     cpap nightly   • Trichilemmal carcinoma 2020    [facility]   • Trichilemmal cyst    • Tuberculosis  or     Tested positive with skin test, took INH for 1 year     Past Surgical History:   Procedure Laterality Date   • CARDIAC CATHETERIZATION N/A 2021    Procedure: Left Heart Cath;  Surgeon: Franklin Baig MD;  Location: [facility] CATH INVASIVE LOCATION;  Service: Cardiology;  Laterality: N/A;   • CARDIAC CATHETERIZATION N/A 2021    Procedure: Coronary angiography;  Surgeon: Franklin Baig MD;  Location: [facility] CATH INVASIVE LOCATION;  Service: Cardiology;  Laterality: N/A;   • CARDIAC CATHETERIZATION N/A 2021    Procedure: Stent CLARISSA coronary;  Surgeon: Franklin Baig MD;  Location: [facility] CATH INVASIVE LOCATION;  Service: Cardiology;  Laterality: N/A;   • CARDIAC CATHETERIZATION N/A 2021    Procedure: Optical Coherent Tomography;  Surgeon: Franklin Baig MD;  Location: [facility] CATH INVASIVE LOCATION;  Service: Cardiology;  Laterality: N/A;   • COLONOSCOPY     • HEAD & NECK SKIN LESION EXCISIONAL BIOPSY      BACK OF THE HEAD   • MOHS SURGERY  2020    BACK OF HEAD   • TUBAL ABDOMINAL LIGATION  2001   • WISDOM TOOTH EXTRACTION  Dec. 1981        OB History        3    Para   2    Term   2            AB   1    Living   2       SAB   1    IAB        Ectopic        Molar        Multiple        Live Births                    Objective     Vital Signs       Physical Exam:      General Appearance:    Alert, cooperative, in no acute distress   Head:    Normocephalic, without obvious abnormality, atraumatic   Eyes:            Lids and lashes normal,  conjunctivae and sclerae normal, no   icterus, no pallor, corneas clear, PERRLA   Ears:    Ears appear intact with no abnormalities noted   Throat:   No oral lesions, no thrush, oral mucosa moist   Neck:   No adenopathy, supple, trachea midline, no thyromegaly, no     carotid bruit, no JVD   Back:     No kyphosis present, no scoliosis present, no skin lesions,       erythema or scars, no tenderness to percussion or                   palpation,   range of motion normal   Lungs:     Clear to auscultation,respirations regular, even and                   unlabored    Heart:    Regular rhythm and normal rate, normal S1 and S2, no            murmur, no gallop, no rub, no click   Breast Exam:    Deferred   Abdomen:     Normal bowel sounds, no masses, no organomegaly, soft        non-tender, non-distended, no guarding, no rebound                 tenderness   Genitalia:    Deferred   Extremities:   Moves all extremities well, no edema, no cyanosis, no              redness   Pulses:   Pulses palpable and equal bilaterally   Skin:   No bleeding, bruising or rash   Lymph nodes:   No palpable adenopathy   Neurologic:   Cranial nerves 2 - 12 grossly intact, sensation intact, DTR        present and equal bilaterally         Results Review:    I reviewed the patient's new clinical results.  I personally viewed and interpreted the patient's EKG/Telemetry data  Discussed with Her cardiologist who felt it was not a change from her prior EKG.    Assessment & Plan   Assessment:    Abnormal uterine bleeding (AUB)  Fibroid uterus    Plan: Total laparoscopic hysterectomy, bilateral salpingo-oophorectomy.  Will place patient in observation overnight and discharge in the morning after surgery if she is able to tolerate p.o., have pain control, ambulate, urinate without difficulty.    I discussed the patients findings and my recommendations with patient.     Tammi Muniz MD  09/21/22  12:01 EDT

## 2022-09-21 NOTE — ANESTHESIA PROCEDURE NOTES
Airway  Urgency: elective    Date/Time: 9/21/2022 2:33 PM  Airway not difficult    General Information and Staff    Patient location during procedure: OR  CRNA/CAA: Kirsten Le CRNA    Indications and Patient Condition  Indications for airway management: airway protection    Preoxygenated: yes  MILS not maintained throughout  Mask difficulty assessment: 2 - vent by mask + OA or adjuvant +/- NMBA    Final Airway Details  Final airway type: endotracheal airway      Successful airway: ETT  Cuffed: yes   Successful intubation technique: direct laryngoscopy  Facilitating devices/methods: intubating stylet  Endotracheal tube insertion site: oral  Blade: Grant  Blade size: 3  ETT size (mm): 7.0  Cormack-Lehane Classification: grade IIa - partial view of glottis  Placement verified by: chest auscultation and capnometry   Measured from: lips  ETT/EBT  to lips (cm): 20  Number of attempts at approach: 1  Assessment: lips, teeth, and gum same as pre-op and atraumatic intubation    Additional Comments  Negative epigastric sounds, Breath sound equal bilaterally with symmetric chest rise and fall

## 2022-09-21 NOTE — OP NOTE
Operative Note:      Date of Service:  09/21/22  Time of Service:  16:04 EDT      Pre-operative diagnosis(es): Pre-Op Diagnosis Codes:     * Abnormal uterine bleeding (AUB) [N93.9]     Post-operative diagnosis(es): Post-Op Diagnosis Codes:     * Abnormal uterine bleeding (AUB) [N93.9]       Procedure(s):     1.  Total Laparoscopic Hysterectomy with Bilateral salpingo-oophorectomy       Surgeon:                           Surgeon(s) and Role:     * Tammi Muniz MD - Primary     * Shade Carballo MD - Assisting      Antibiotics: cefazolin (Ancef) ordered on call to OR     Anesthesia:                       Type: General                                             ASA:  III      Operative findings:          Enlarged fibroid uterus    Procedure:                          The patient was seen in the Holding Room. The risks, benefits, complications, treatment options, and expected outcomes were discussed with the patient.  The patient concurred with the proposed plan, giving informed consent.   The patient was taken to the Operating Room and was identified and the procedure verified as total laparoscopic hysterectomy with bilateral salpingo-oophorectomy. A Time Out was held and the above information confirmed.     After induction of anesthesia the patient was placed in dorsal lithotomy position using Rodriguez Stirrups and was draped and prepped in the usual sterile manner. A Marie Uterine Manipulator was then placed in the normal fashion and a Leach catheter was placed. Gloves were changed, the patient's legs were lowered and we proceeded with the abdominal portion of the case.      The infraumbilical tissues were injected with 0.25% Marcaine and an infraumbilical incision made with the knife.  The underlying tissues were dissected with a hemostat.  A 12 mm Optiview port was then placed under direct visualization on the first attempt. After entry into the peritoneal cavity, CO2 gas was used to insufflate the cavity  and the patient placed in Trendelenburg.  Underlying bowel was inspected and was normal in appearance.  We then proceeded with placement of the two lateral port sites and single suprapubic port site..  Appropriate locations were identified and the overlying skin injected with the marcaine.  A transverse skin incision was then made with the knife and 12mm Optiview ports placed under direct visualization without complication.  We then inspected the pelvis and upper abdomen.  Bilateral ureters were identified and were low in the pelvis and away from the surgical planes.       We began with the left infundibulopelvic ligament.  The fallopian tube was grasped, elevated and using the Ace Harmonic scalpel, the infundibulopelvic ligament was transected followed by  the mesosalpinx down to the level of the uterus.  The round ligament was identified and transected using the Ace Harmonic Scalpel. The anterior peritoneal reflection was incised and the bladder was dissected off the lower uterine segment.   We continued to skeletonize using the Ace till the uterine arteries were visualized.  These procedures were then performed on the contralateral side as previously described.  We then proceeded with ligating the uterine arteries using the Ace and this was uncomplicated.  The uterus was blanching well.  We reinspected the bladder flap and were able to note the Koh ring and the pubocervical fascia.  This area was well away from the bladder.   We then proceeded with colpotomy using the Ace and the Koh ring as a guide, this was done circumfrentially freeing the uterus from the pelvis.  The uterus and fallopian tubes were then removed vaginally and the vaginal occluder replaced to maintain pneumoperitoneum.  We then proceeded with closure of the vaginal cuff using 0-Vicryl and the Endostitch.  This was done in a running fashion and was uncomplicated.  This was secured with Lapra-Ty's.  There was good closure and no leak noted after  removal of the vaginal occluder.  We then copiously irrigated the pelvis and inspected the pelvis.  Pressure was taken down to 6mmHg and the pelvis inspected.  All surgical planes had excellent hemostasis.  The ureters were reinspected and were normal in appearance and away from surgical planes.  There was good peristalsis.  Interceed was placed over the vaginal cuff.  Suprapubic port was removed and closed with a 1-0 Vicryl in an interrupted fashion using a Shaun Eugene device. The ports were then removed under direct visualization and the abdomen deflated and the umbilical port removed.  The port site skin incision was closed with a 4-0 Monocryl in a subcuticular fashion. Dermabond was applied to all sites.     Vaginal evaluation revealed all instruments removed.  The vaginal cuff was intact.  There is a small tear in the introital opening secondary to trauma from removal of the uterus.  This was repaired with a 3-0 Vicryl in a running locked fashion.     Instrument, sponge, and needle counts were correct x 2.  The patient was extubated and taken to the PACU in stable condition.    Dr. Cale Carballo was responsible for performing the following activities: Retraction, Suction, Irrigation, Suturing, Closing and Held/Positioned Camera and their skilled assistance was necessary for the success of this case.         EBL:                                  100 cc's    Drains:                             1.  Leach Catheter    Pathology:                       Uterus, bilateral fallopian tubes, bilateral ovaries, cervix    Complications:               None

## 2022-09-21 NOTE — ANESTHESIA POSTPROCEDURE EVALUATION
Patient: Sapna Ng    Procedure Summary     Date: 09/21/22 Room / Location:  [facility] /  [facility]    Anesthesia Start: 1428 Anesthesia Stop: 1611    Procedure: TOTAL LAPAROSCOPIC HYSTERECTOMY BILATERAL SALPINGO OOPHORECTOMY (Bilateral Abdomen) Diagnosis:       Abnormal uterine bleeding (AUB)      (Abnormal uterine bleeding (AUB) [N93.9])    Surgeons: Tammi Muniz MD Provider: Willem Watts MD    Anesthesia Type: general ASA Status: 3          Anesthesia Type: general    Vitals  Vitals Value Taken Time   BP     Temp     Pulse 68 09/21/22 1610   Resp     SpO2 96 % 09/21/22 1610   Vitals shown include unvalidated device data.        Post Anesthesia Care and Evaluation    Patient location during evaluation: PACU  Patient participation: complete - patient participated  Level of consciousness: awake and alert  Pain score: 0  Pain management: adequate    Airway patency: patent  Anesthetic complications: No anesthetic complications  PONV Status: none  Cardiovascular status: hemodynamically stable and acceptable  Respiratory status: nonlabored ventilation, acceptable and nasal cannula  Hydration status: acceptable

## 2022-09-22 VITALS
RESPIRATION RATE: 16 BRPM | DIASTOLIC BLOOD PRESSURE: 74 MMHG | BODY MASS INDEX: 32.79 KG/M2 | HEART RATE: 51 BPM | WEIGHT: 167 LBS | OXYGEN SATURATION: 94 % | SYSTOLIC BLOOD PRESSURE: 120 MMHG | TEMPERATURE: 97.2 F | HEIGHT: 60 IN

## 2022-09-22 LAB
DEPRECATED RDW RBC AUTO: 36.6 FL (ref 37–54)
ERYTHROCYTE [DISTWIDTH] IN BLOOD BY AUTOMATED COUNT: 11.1 % (ref 12.3–15.4)
HCT VFR BLD AUTO: 37.1 % (ref 34–46.6)
HGB BLD-MCNC: 12.8 G/DL (ref 12–15.9)
MCH RBC QN AUTO: 31.3 PG (ref 26.6–33)
MCHC RBC AUTO-ENTMCNC: 34.5 G/DL (ref 31.5–35.7)
MCV RBC AUTO: 90.7 FL (ref 79–97)
PLATELET # BLD AUTO: 182 10*3/MM3 (ref 140–450)
PMV BLD AUTO: 12 FL (ref 6–12)
RBC # BLD AUTO: 4.09 10*6/MM3 (ref 3.77–5.28)
WBC NRBC COR # BLD: 8.16 10*3/MM3 (ref 3.4–10.8)

## 2022-09-22 PROCEDURE — 99024 POSTOP FOLLOW-UP VISIT: CPT | Performed by: OBSTETRICS & GYNECOLOGY

## 2022-09-22 PROCEDURE — 85027 COMPLETE CBC AUTOMATED: CPT | Performed by: OBSTETRICS & GYNECOLOGY

## 2022-09-22 PROCEDURE — G0378 HOSPITAL OBSERVATION PER HR: HCPCS

## 2022-09-22 RX ORDER — OXYCODONE HYDROCHLORIDE 5 MG/1
5 TABLET ORAL EVERY 4 HOURS PRN
Qty: 30 TABLET | Refills: 0 | Status: SHIPPED | OUTPATIENT
Start: 2022-09-22 | End: 2022-10-01

## 2022-09-22 RX ORDER — IBUPROFEN 600 MG/1
600 TABLET ORAL EVERY 6 HOURS SCHEDULED
Qty: 30 TABLET | Refills: 0 | Status: SHIPPED | OUTPATIENT
Start: 2022-09-22 | End: 2022-11-08

## 2022-09-22 RX ORDER — NITROFURANTOIN 25; 75 MG/1; MG/1
100 CAPSULE ORAL EVERY 12 HOURS SCHEDULED
Qty: 13 CAPSULE | Refills: 0 | Status: SHIPPED | OUTPATIENT
Start: 2022-09-22 | End: 2022-09-29

## 2022-09-22 RX ADMIN — NITROFURANTOIN (MONOHYDRATE/MACROCRYSTALS) 100 MG: 75; 25 CAPSULE ORAL at 09:00

## 2022-09-22 RX ADMIN — IBUPROFEN 600 MG: 600 TABLET ORAL at 06:19

## 2022-09-22 RX ADMIN — GABAPENTIN 100 MG: 100 CAPSULE ORAL at 08:55

## 2022-09-22 RX ADMIN — IBUPROFEN 600 MG: 600 TABLET ORAL at 00:39

## 2022-09-22 RX ADMIN — DOCUSATE SODIUM 100 MG: 100 CAPSULE, LIQUID FILLED ORAL at 08:55

## 2022-09-22 RX ADMIN — CARVEDILOL 3.12 MG: 3.12 TABLET, FILM COATED ORAL at 08:55

## 2022-09-22 NOTE — DISCHARGE SUMMARY
Discharge Summary    Date of Admission: 9/21/2022  Date of Discharge:  9/22/2022      Patient: Sapna Ng      MR#:[id number]    Primary Surgeon/OB: Tammi Muniz MD    Discharge Surgeon/OB: Tammi Muniz MD    Presenting Problem/History of Present Illness  Abnormal uterine bleeding (AUB) [N93.9]  Abnormal uterine bleeding [N93.9]     Patient Active Problem List    Diagnosis    • *Abnormal uterine bleeding (AUB) [N93.9]    • S/P laparoscopic hysterectomy [Z90.710]    • Abnormal uterine bleeding [N93.9]    • Fluttering heart [I49.8]    • Coronary arteriosclerosis [I25.10]    • Precordial pain [R07.2]    • Mixed hyperlipidemia [E78.2]    • Abnormal chest x-ray [R93.89]    • Moderate obstructive sleep apnea [G47.33]    • Obesity (BMI 30-39.9) [E66.9]        Preop Diagnosis: Abnormal uterine bleeding and fibroid uterus      Discharge Diagnosis: abNormal uterine bleeding and fibroid uterus    Procedures: Total laparoscopic hysterectomy, bilateral salpingo-oophorectomy        Discharge Date: 9/22/2022; Discharge Time: 08:36 EDT        Hospital Course  Patient is a 57 y.o. female  status post TLH/BSO with uneventful postoperative recovery.  Patient was advanced to regular diet on postoperative day#1.  On discharge, ambulating, tolerating a regular diet without any difficulties and her incision is dry, clean and intact.     Condition on Discharge:  Stable    Vital Signs  Temp:  [95.8 °F (35.4 °C)-97.9 °F (36.6 °C)] 97.2 °F (36.2 °C)  Heart Rate:  [48-73] 51  Resp:  [14-18] 16  BP: (106-148)/(63-88) 120/74    Lab Results   Component Value Date    WBC 8.16 09/22/2022    HGB 12.8 09/22/2022    HCT 37.1 09/22/2022    MCV 90.7 09/22/2022     09/22/2022       Discharge Disposition  Home or Self Care    Discharge Medications     Discharge Medications      New Medications      Instructions Start Date   ibuprofen 600 MG tablet  Commonly known as: ADVIL,MOTRIN   600 mg, Oral, Every 6 Hours  Scheduled      nitrofurantoin (macrocrystal-monohydrate) 100 MG capsule  Commonly known as: MACROBID   100 mg, Oral, Every 12 Hours Scheduled      oxyCODONE 5 MG immediate release tablet  Commonly known as: ROXICODONE   5 mg, Oral, Every 4 Hours PRN         Changes to Medications      Instructions Start Date   aspirin 81 MG EC tablet  What changed: additional instructions   81 mg, Oral, Daily      losartan 25 MG tablet  Commonly known as: COZAAR  What changed: when to take this   25 mg, Oral, Daily         Continue These Medications      Instructions Start Date   ALLERGY SERUM INJECTION   Subcutaneous, Every 14 Days      atorvastatin 80 MG tablet  Commonly known as: LIPITOR   80 mg, Oral, Nightly      carvedilol 3.125 MG tablet  Commonly known as: COREG   3.125 mg, Oral, 2 Times Daily      cetirizine 10 MG tablet  Commonly known as: zyrTEC   10 mg, Oral, Every Night at Bedtime      coenzyme Q10 100 MG capsule   100 mg, Oral, Daily      ferrous sulfate 140 (45 Fe) MG tablet controlled-release tablet   140 mg, Oral, Every Other Day      montelukast 10 MG tablet  Commonly known as: SINGULAIR   10 mg, Oral, Nightly      nitroglycerin 0.4 MG SL tablet  Commonly known as: NITROSTAT   0.4 mg, Sublingual, Every 5 Minutes PRN, ; pt needs refill      Vitamin D3 50 MCG (2000 UT) capsule   2,000 Units, Oral, Daily             Discharge Medications  Follow-up with Dr. Muniz in 2 weeks.  No driving for 2 weeks.  Nothing in the vagina for 6 weeks.  No lifting over 6 weeks.    Follow-up Appointments  Future Appointments   Date Time Provider Department Center   10/10/2022  1:40 PM Tammi Muniz MD [facility]   11/10/2022  3:15 PM Lenard Sawyer MD [facility]   2023 10:30 AM Jennie Live APRN [facility]         Tammi Muniz MD  22  08:35 EDT  [facility]

## 2022-09-23 LAB
CYTO UR: NORMAL
LAB AP CASE REPORT: NORMAL
LAB AP CLINICAL INFORMATION: NORMAL
PATH REPORT.FINAL DX SPEC: NORMAL
PATH REPORT.GROSS SPEC: NORMAL

## 2022-10-12 ENCOUNTER — OFFICE VISIT (OUTPATIENT)
Dept: OBSTETRICS AND GYNECOLOGY | Facility: CLINIC | Age: 57
End: 2022-10-12

## 2022-10-12 VITALS — DIASTOLIC BLOOD PRESSURE: 84 MMHG | WEIGHT: 168 LBS | BODY MASS INDEX: 32.81 KG/M2 | SYSTOLIC BLOOD PRESSURE: 132 MMHG

## 2022-10-12 DIAGNOSIS — Z48.89 POSTOPERATIVE VISIT: Primary | ICD-10-CM

## 2022-10-12 PROCEDURE — 99024 POSTOP FOLLOW-UP VISIT: CPT | Performed by: OBSTETRICS & GYNECOLOGY

## 2022-10-12 NOTE — PROGRESS NOTES
OBGYN Postoperative Exam Note          Subjective   Chief Complaint   Patient presents with   • Post-op     Sapna Ng is a 57 y.o. year old  presenting to be seen for her post-operative visit. She is S/P total laparoscopic hysterectomy with bilateral salpingectomy and oophorectomy on 2022 at [facility] for AUB. Currently she reports no problems with eating, bowel movements, voiding, or wound drainage and pain is well controlled.    The pathology results from her procedure are in Sapna's record and are benign. Simple hyperplasia discussed     OTHER THINGS SHE WANTS TO DISCUSS TODAY:  Nothing else      Current Outpatient Medications:   •  ALLERGY SERUM INJECTION, Inject  under the skin into the appropriate area as directed Every 14 (Fourteen) Days., Disp: , Rfl:   •  aspirin 81 MG EC tablet, Take 1 tablet by mouth Daily. (Patient taking differently: Take 1 tablet by mouth Daily. Stopped for surgery per Dr Muniz instructions), Disp: 180 tablet, Rfl: 3  •  atorvastatin (LIPITOR) 80 MG tablet, Take 1 tablet by mouth Every Night., Disp: 90 tablet, Rfl: 3  •  carvedilol (COREG) 3.125 MG tablet, Take 1 tablet by mouth 2 (Two) Times a Day., Disp: 180 tablet, Rfl: 3  •  cetirizine (zyrTEC) 10 MG tablet, Take 10 mg by mouth every night at bedtime., Disp: , Rfl:   •  Cholecalciferol (Vitamin D3) 50 MCG (2000 UT) capsule, Take 2,000 Units by mouth Daily., Disp: , Rfl:   •  coenzyme Q10 100 MG capsule, Take 100 mg by mouth Daily., Disp: , Rfl:   •  ferrous sulfate 140 (45 Fe) MG tablet controlled-release tablet, Take 140 mg by mouth Every Other Day., Disp: , Rfl:   •  ibuprofen (ADVIL,MOTRIN) 600 MG tablet, Take 1 tablet by mouth Every 6 (Six) Hours., Disp: 30 tablet, Rfl: 0  •  losartan (COZAAR) 25 MG tablet, TAKE 1 TABLET BY MOUTH DAILY. (Patient taking differently: Take 1 tablet by mouth Every Morning.), Disp: 30 tablet, Rfl: 3  •  montelukast (SINGULAIR) 10 MG tablet, Take 10 mg by mouth  Every Night., Disp: , Rfl:   •  nitroglycerin (NITROSTAT) 0.4 MG SL tablet, Place 0.4 mg under the tongue Every 5 (Five) Minutes As Needed. ; pt needs refill, Disp: , Rfl:      Past Medical History:   Diagnosis Date   • Anemia A few years ago, have been taking iron since.   • Coronary artery disease     s/p MI; LAD blockage-stent insertion   • Environmental allergies     allergy shots every two weeks   • Fibroid  discovered    Still have them   • History of COVID-19 2021    no hospitalization   • Hyperlipidemia Several years ago, it runs a little high or the high end of normal.   • Hypertension A few years ago I started taking Losartin.   • Myocardial infarction (HCC) 2021    LAD blockage; s/.p stent   • PPD positive, treated    • Sleep apnea     cpap nightly   • Trichilemmal carcinoma 2020    [facility]   • Trichilemmal cyst    • Tuberculosis  or     Tested positive with skin test, took INH for 1 year        Past Surgical History:   Procedure Laterality Date   • CARDIAC CATHETERIZATION N/A 2021    Procedure: Left Heart Cath;  Surgeon: Franklin Baig MD;  Location: [facility] CATH INVASIVE LOCATION;  Service: Cardiology;  Laterality: N/A;   • CARDIAC CATHETERIZATION N/A 2021    Procedure: Coronary angiography;  Surgeon: Franklin Baig MD;  Location: [facility] CATH INVASIVE LOCATION;  Service: Cardiology;  Laterality: N/A;   • CARDIAC CATHETERIZATION N/A 2021    Procedure: Stent CLARISSA coronary;  Surgeon: Franklin Baig MD;  Location: [facility] CATH INVASIVE LOCATION;  Service: Cardiology;  Laterality: N/A;   • CARDIAC CATHETERIZATION N/A 2021    Procedure: Optical Coherent Tomography;  Surgeon: Franklin Baig MD;  Location: [facility] CATH INVASIVE LOCATION;  Service: Cardiology;  Laterality: N/A;   • COLONOSCOPY     • HEAD & NECK SKIN LESION EXCISIONAL BIOPSY      BACK OF THE HEAD   • MOHS SURGERY  2020    BACK OF HEAD   • TOTAL  LAPAROSCOPIC HYSTERECTOMY SALPINGO OOPHORECTOMY Bilateral 9/21/2022    Procedure: TOTAL LAPAROSCOPIC HYSTERECTOMY BILATERAL SALPINGO OOPHORECTOMY;  Surgeon: Tammi Muniz MD;  Location: [facility];  Service: Obstetrics/Gynecology;  Laterality: Bilateral;   • TUBAL ABDOMINAL LIGATION  09/2001   • WISDOM TOOTH EXTRACTION  Dec. 1981       The following portions of the patient's history were reviewed and updated as appropriate:current medications and allergies    Review of Systems   Constitutional: Negative.    Respiratory: Negative.    Cardiovascular: Negative.    Gastrointestinal: Negative.    Genitourinary: Negative.    Musculoskeletal: Negative.    Neurological: Negative.    Psychiatric/Behavioral: Negative.           Objective   /84   Wt 76.2 kg (168 lb)   LMP 09/19/2022   BMI 32.81 kg/m²     Physical Exam  Vitals reviewed. Exam conducted with a chaperone present.   Constitutional:       Appearance: Normal appearance. She is normal weight.   HENT:      Head: Normocephalic and atraumatic.   Abdominal:      General: Abdomen is flat. Bowel sounds are normal.      Palpations: Abdomen is soft.   Genitourinary:     General: Normal vulva.      Vagina: Normal.      Comments: Vaginal cuff healing well  Skin:     General: Skin is warm and dry.   Neurological:      Mental Status: She is alert and oriented to person, place, and time.   Psychiatric:         Mood and Affect: Mood normal.         Behavior: Behavior normal.              Assessment   1. S/P TLH with bilateral salpingo-oophorectomy      Plan   1. Avoid tampons, douching and intercourse  2. OK to drive  3. Lifting restriction of no more than 10 pounds.  4. Nothing per vagina still until 6 weeks after her procedure.  5. The importance of keeping all planned follow-up and taking all medications as prescribed was emphasized.  6. Return in about 3 weeks (around 11/2/2022) for with Dr. Muniz for 6 week postoperative visit.              Ezequiel Madsen  MD [place]  10/12/2022

## 2022-10-14 ENCOUNTER — TRANSCRIBE ORDERS (OUTPATIENT)
Dept: ADMINISTRATIVE | Facility: HOSPITAL | Age: 57
End: 2022-10-14

## 2022-10-14 DIAGNOSIS — Z12.31 VISIT FOR SCREENING MAMMOGRAM: Primary | ICD-10-CM

## 2022-11-08 ENCOUNTER — OFFICE VISIT (OUTPATIENT)
Dept: OBSTETRICS AND GYNECOLOGY | Facility: CLINIC | Age: 57
End: 2022-11-08

## 2022-11-08 VITALS
DIASTOLIC BLOOD PRESSURE: 80 MMHG | HEIGHT: 60 IN | BODY MASS INDEX: 33.41 KG/M2 | SYSTOLIC BLOOD PRESSURE: 118 MMHG | WEIGHT: 170.2 LBS

## 2022-11-08 DIAGNOSIS — Z90.710 S/P LAPAROSCOPIC HYSTERECTOMY: Primary | ICD-10-CM

## 2022-11-08 PROCEDURE — 99024 POSTOP FOLLOW-UP VISIT: CPT | Performed by: OBSTETRICS & GYNECOLOGY

## 2022-11-08 NOTE — PROGRESS NOTES
Answers for HPI/ROS submitted by the patient on 2022  Please describe your symptoms.: Surgery follow-up  Have you had these symptoms before?: No  How long have you been having these symptoms?: Greater than 2 weeks  Please list any medications you are currently taking for this condition.: None  Please describe any probable cause for these symptoms. : N/A  What is the primary reason for your visit?: Other         OBGYN Postoperative Exam Note          Subjective   Chief Complaint   Patient presents with   • Post-op     Sapna Ng is a 57 y.o. year old  presenting to be seen for her post-operative visit. She is S/P TLH with bilateral salpingo-oophorectomy  on 2022 at [facility] for abnormal uterine bleeding . Currently she reports no problems with eating, bowel movements, voiding, or wound drainage and pain is well controlled.    The results were discussed with Sapna.    OTHER THINGS SHE WANTS TO DISCUSS TODAY:  Headaches that are occuring off and on since surgery, she isn't sure if they are related to hormones.       Current Outpatient Medications:   •  ALLERGY SERUM INJECTION, Inject  under the skin into the appropriate area as directed Every 14 (Fourteen) Days., Disp: , Rfl:   •  aspirin 81 MG EC tablet, Take 1 tablet by mouth Daily. (Patient taking differently: Take 1 tablet by mouth Daily. Stopped for surgery per Dr Muniz instructions), Disp: 180 tablet, Rfl: 3  •  atorvastatin (LIPITOR) 80 MG tablet, Take 1 tablet by mouth Every Night., Disp: 90 tablet, Rfl: 3  •  carvedilol (COREG) 3.125 MG tablet, Take 1 tablet by mouth 2 (Two) Times a Day., Disp: 180 tablet, Rfl: 3  •  cetirizine (zyrTEC) 10 MG tablet, Take 10 mg by mouth every night at bedtime., Disp: , Rfl:   •  Cholecalciferol (Vitamin D3) 50 MCG (2000 UT) capsule, Take 2,000 Units by mouth Daily., Disp: , Rfl:   •  coenzyme Q10 100 MG capsule, Take 100 mg by mouth Daily., Disp: , Rfl:   •  ferrous sulfate 140 (45 Fe) MG  tablet controlled-release tablet, Take 140 mg by mouth Every Other Day., Disp: , Rfl:   •  losartan (COZAAR) 25 MG tablet, TAKE 1 TABLET BY MOUTH DAILY. (Patient taking differently: Take 1 tablet by mouth Every Morning.), Disp: 30 tablet, Rfl: 3  •  montelukast (SINGULAIR) 10 MG tablet, Take 10 mg by mouth Every Night., Disp: , Rfl:   •  nitroglycerin (NITROSTAT) 0.4 MG SL tablet, Place 0.4 mg under the tongue Every 5 (Five) Minutes As Needed. ; pt needs refill, Disp: , Rfl:      Past Medical History:   Diagnosis Date   • Anemia A few years ago, have been taking iron since.   • Coronary artery disease     s/p MI; LAD blockage-stent insertion   • Environmental allergies     allergy shots every two weeks   • Fibroid  discovered    Still have them   • History of COVID-19 2021    no hospitalization   • Hyperlipidemia Several years ago, it runs a little high or the high end of normal.   • Hypertension A few years ago I started taking Losartin.   • Myocardial infarction (HCC) 2021    LAD blockage; s/.p stent   • PPD positive, treated    • Sleep apnea     cpap nightly   • Trichilemmal carcinoma 2020    [facility]   • Trichilemmal cyst    • Tuberculosis  or     Tested positive with skin test, took INH for 1 year        Past Surgical History:   Procedure Laterality Date   • CARDIAC CATHETERIZATION N/A 2021    Procedure: Left Heart Cath;  Surgeon: Franklin Baig MD;  Location: [facility] CATH INVASIVE LOCATION;  Service: Cardiology;  Laterality: N/A;   • CARDIAC CATHETERIZATION N/A 2021    Procedure: Coronary angiography;  Surgeon: Franklin Baig MD;  Location: [facility] CATH INVASIVE LOCATION;  Service: Cardiology;  Laterality: N/A;   • CARDIAC CATHETERIZATION N/A 2021    Procedure: Stent CLARISSA coronary;  Surgeon: Franklin Baig MD;  Location: [facility] CATH INVASIVE LOCATION;  Service: Cardiology;  Laterality: N/A;   • CARDIAC CATHETERIZATION N/A 2021     "Procedure: Optical Coherent Tomography;  Surgeon: Franklin Baig MD;  Location: [facility] INVASIVE LOCATION;  Service: Cardiology;  Laterality: N/A;   • COLONOSCOPY     • HEAD & NECK SKIN LESION EXCISIONAL BIOPSY  2020    BACK OF THE HEAD   • MOHS SURGERY  08/24/2020    BACK OF HEAD   • TOTAL LAPAROSCOPIC HYSTERECTOMY SALPINGO OOPHORECTOMY Bilateral 9/21/2022    Procedure: TOTAL LAPAROSCOPIC HYSTERECTOMY BILATERAL SALPINGO OOPHORECTOMY;  Surgeon: Tammi Muniz MD;  Location: [facility] OR;  Service: Obstetrics/Gynecology;  Laterality: Bilateral;   • TUBAL ABDOMINAL LIGATION  09/2001   • WISDOM TOOTH EXTRACTION  Dec. 1981       The following portions of the patient's history were reviewed and updated as appropriate:current medications and allergies    Review of Systems   Constitutional: Negative.    HENT: Negative.    Eyes: Negative.    Respiratory: Negative.    Cardiovascular: Negative.    Gastrointestinal: Negative.    Endocrine: Negative.    Genitourinary: Negative.    Musculoskeletal: Negative.    Skin: Negative.    Allergic/Immunologic: Negative.    Neurological: Negative.    Hematological: Negative.    Psychiatric/Behavioral: Negative.           Objective   /80   Ht 152.4 cm (60\")   Wt 77.2 kg (170 lb 3.2 oz)   LMP 09/19/2022   BMI 33.24 kg/m²     Physical Exam  Vitals and nursing note reviewed. Exam conducted with a chaperone present.   Constitutional:       Appearance: Normal appearance. She is well-developed.   HENT:      Head: Normocephalic and atraumatic.   Pulmonary:      Effort: Pulmonary effort is normal.      Breath sounds: Normal breath sounds.   Abdominal:      General: There is no distension.      Palpations: Abdomen is soft. Abdomen is not rigid. There is no mass.      Tenderness: There is no abdominal tenderness. There is no guarding or rebound.      Comments: Small laparoscopic scars well-healed.   Genitourinary:     General: Normal vulva.      Exam position: Lithotomy " position.      Labia:         Right: No rash, tenderness or lesion.         Left: No rash, tenderness or lesion.       Urethra: No urethral pain, urethral swelling or urethral lesion.      Vagina: Normal. No tenderness or lesions.      Uterus: Absent.       Adnexa:         Right: No mass, tenderness or fullness.          Left: No mass, tenderness or fullness.        Rectum: No external hemorrhoid.      Comments: Chaperone Present.  Cuff intact.  Healing with thin blood-tinged discharge  Musculoskeletal:      Cervical back: Normal range of motion.   Skin:     General: Skin is warm and dry.   Neurological:      Mental Status: She is alert and oriented to person, place, and time.   Psychiatric:         Mood and Affect: Mood normal.         Behavior: Behavior normal.              Assessment   1. S/P TLH with bilateral salpingo-oophorectomy      Plan   1. Avoid tampons, douching and intercourse  2. OK to drive  3. Nothing per vagina still until 12 weeks after her procedure.  4. The importance of keeping all planned follow-up and taking all medications as prescribed was emphasized.  5. Return for Annual physical.              Tammi Muniz MD  11/08/2022

## 2022-11-10 ENCOUNTER — OFFICE VISIT (OUTPATIENT)
Dept: CARDIOLOGY | Facility: CLINIC | Age: 57
End: 2022-11-10

## 2022-11-10 VITALS
HEIGHT: 60 IN | OXYGEN SATURATION: 99 % | SYSTOLIC BLOOD PRESSURE: 132 MMHG | RESPIRATION RATE: 18 BRPM | WEIGHT: 170 LBS | HEART RATE: 70 BPM | DIASTOLIC BLOOD PRESSURE: 86 MMHG | BODY MASS INDEX: 33.38 KG/M2

## 2022-11-10 DIAGNOSIS — I25.10 CORONARY ARTERIOSCLEROSIS: Primary | ICD-10-CM

## 2022-11-10 DIAGNOSIS — E66.9 OBESITY (BMI 30-39.9): ICD-10-CM

## 2022-11-10 DIAGNOSIS — I10 PRIMARY HYPERTENSION: ICD-10-CM

## 2022-11-10 DIAGNOSIS — E78.2 MIXED HYPERLIPIDEMIA: ICD-10-CM

## 2022-11-10 PROCEDURE — 99213 OFFICE O/P EST LOW 20 MIN: CPT | Performed by: INTERNAL MEDICINE

## 2022-11-10 RX ORDER — LOSARTAN POTASSIUM 25 MG/1
25 TABLET ORAL DAILY
Qty: 30 TABLET | Refills: 5 | Status: SHIPPED | OUTPATIENT
Start: 2022-11-10 | End: 2022-11-17 | Stop reason: SDUPTHER

## 2022-11-10 RX ORDER — CARVEDILOL 3.12 MG/1
3.12 TABLET ORAL 2 TIMES DAILY
Qty: 180 TABLET | Refills: 5 | Status: SHIPPED | OUTPATIENT
Start: 2022-11-10

## 2022-11-10 RX ORDER — ATORVASTATIN CALCIUM 80 MG/1
80 TABLET, FILM COATED ORAL NIGHTLY
Qty: 90 TABLET | Refills: 5 | Status: SHIPPED | OUTPATIENT
Start: 2022-11-10

## 2022-11-10 NOTE — PROGRESS NOTES
[facility] Cardiology Office Follow Up Note    Sapna Ng  [id number]  11/10/2022    Primary Care Provider: Letty Diaz MD    Chief Complaint: Routine follow-up    History of Present Illness:   Mrs. Sapna Ng is a 57 y.o. female who presents to the Cardiology Clinic for routine follow-up. The patient has a past medical history significant for hypertension.  She has a past cardiac history significant for severe one-vessel coronary artery disease presenting with an anterior STEMI in .  She underwent PCI to the mid LAD with placement of a 3.0 x 28 mm Xience CLARISSA at that time.  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  A post MI echocardiogram at that time showed preserved LV systolic function, with akinesis of the apex and anteroseptal wall.  She returns to cardiology clinic today for routine follow-up.  Since her last appointment, the patient denies any significant chest pain or exertional chest discomfort.  No significant exertional dyspnea.  She continues tolerate her current cardiac medications without significant difficulty.  No specific complaints today.    Past Cardiac Testin. Last Coronary Angio: 2021              1.  Thrombotic occlusion of the mid LAD is culprit for anterior STEMI                          -Successful PCI with placement of a 3.0 x 28 mm Xience CLARISSA              2.  Mild luminal irregularities in the RCA and LCx              3.  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 Prior Stress Testing: None  3. Last Echo: 2021              1.  LVEF 55-60%              2.  Akinesis of the apex and anteroseptal wall              3.  Grade 1 diastolic dysfunction  4. Prior Holter Monitor: 14-day monitor 10/21              1.  The predominant rhythm is sinus rhythm with average heart rate 65 bpm              2.  No sustained arrhythmias              3.  Rare  ventricular ectopy              4.  No correlation of symptoms to ectopy    Review of Systems:   Review of Systems   Constitutional: Negative for activity change, appetite change, chills, diaphoresis, fatigue, fever, unexpected weight gain and unexpected weight loss.   Eyes: Negative for blurred vision and double vision.   Respiratory: Negative for cough, chest tightness, shortness of breath and wheezing.    Cardiovascular: Negative for chest pain, palpitations and leg swelling.   Gastrointestinal: Negative for abdominal pain, anal bleeding, blood in stool and GERD.   Endocrine: Negative for cold intolerance and heat intolerance.   Genitourinary: Negative for hematuria.   Neurological: Negative for dizziness, syncope, weakness and light-headedness.   Hematological: Does not bruise/bleed easily.   Psychiatric/Behavioral: Negative for depressed mood and stress. The patient is not nervous/anxious.        I have reviewed and/or updated the patient's past medical, past surgical, family, social history, problem list and allergies as appropriate.     Medications:     Current Outpatient Medications:   •  ALLERGY SERUM INJECTION, Inject  under the skin into the appropriate area as directed Every 14 (Fourteen) Days., Disp: , Rfl:   •  aspirin 81 MG EC tablet, Take 1 tablet by mouth Daily. (Patient taking differently: Take 1 tablet by mouth Daily. Stopped for surgery per Dr Muniz instructions), Disp: 180 tablet, Rfl: 3  •  atorvastatin (LIPITOR) 80 MG tablet, Take 1 tablet by mouth Every Night., Disp: 90 tablet, Rfl: 5  •  carvedilol (COREG) 3.125 MG tablet, Take 1 tablet by mouth 2 (Two) Times a Day., Disp: 180 tablet, Rfl: 5  •  cetirizine (zyrTEC) 10 MG tablet, Take 10 mg by mouth every night at bedtime., Disp: , Rfl:   •  Cholecalciferol (Vitamin D3) 50 MCG (2000 UT) capsule, Take 2,000 Units by mouth Daily., Disp: , Rfl:   •  coenzyme Q10 100 MG capsule, Take 100 mg by mouth Daily., Disp: , Rfl:   •  ferrous sulfate 140  "(45 Fe) MG tablet controlled-release tablet, Take 140 mg by mouth Every Other Day., Disp: , Rfl:   •  losartan (COZAAR) 25 MG tablet, Take 1 tablet by mouth Daily., Disp: 30 tablet, Rfl: 5  •  montelukast (SINGULAIR) 10 MG tablet, Take 10 mg by mouth Every Night., Disp: , Rfl:   •  nitroglycerin (NITROSTAT) 0.4 MG SL tablet, Place 0.4 mg under the tongue Every 5 (Five) Minutes As Needed. ; pt needs refill, Disp: , Rfl:     Physical Exam:  Vital Signs:   Vitals:    11/10/22 1506   BP: 132/86   BP Location: Right arm   Patient Position: Sitting   Pulse: 70   Resp: 18   SpO2: 99%   Weight: 77.1 kg (170 lb)   Height: 152.4 cm (60\")       Physical Exam  Constitutional:       General: She is not in acute distress.     Appearance: She is well-developed. She is obese. She is not diaphoretic.   HENT:      Head: Normocephalic and atraumatic.   Eyes:      General: No scleral icterus.     Pupils: Pupils are equal, round, and reactive to light.   Neck:      Trachea: No tracheal deviation.   Cardiovascular:      Rate and Rhythm: Normal rate and regular rhythm.      Heart sounds: Normal heart sounds. No murmur heard.    No friction rub. No gallop.      Comments: Normal JVD.  Pulmonary:      Effort: Pulmonary effort is normal. No respiratory distress.      Breath sounds: Normal breath sounds. No stridor. No wheezing or rales.   Chest:      Chest wall: No tenderness.   Abdominal:      General: Bowel sounds are normal. There is no distension.      Palpations: Abdomen is soft.      Tenderness: There is no abdominal tenderness. There is no guarding or rebound.   Musculoskeletal:         General: No swelling. Normal range of motion.      Cervical back: Neck supple. No tenderness.   Lymphadenopathy:      Cervical: No cervical adenopathy.   Skin:     General: Skin is warm and dry.      Findings: No erythema.   Neurological:      General: No focal deficit present.      Mental Status: She is alert and oriented to person, place, and " time.   Psychiatric:         Mood and Affect: Mood normal.         Behavior: Behavior normal.         Results Review:   I reviewed the patient's new clinical results.      Assessment / Plan:        1.  Coronary artery disease  --Initially presented with anterior STEMI in 4/21, underwent PCI to the mid LAD, mild nonobstructive residual disease in the LAD and LCx  -- Remains active without chest pain or exertional angina  -- Continue aspirin monotherapy  --Continue high intensity statin  --Continue metoprolol and losartan  --Follow-up in 1 year, sooner if required     2.  Hypertension  -- BP adequately controlled with current antihypertensives    3.  Dyslipidemia  -- Lipid profile in 4/21 showed LDL 90, HDL 30, triglycerides 147  -- Continue high intensity statin    4.  Obesity, BMI 33  -- Weight loss advised        Follow Up:   Return in about 1 year (around 11/10/2023).      Thank you for allowing me to participate in the care of your patient. Please to not hesitate to contact me with additional questions or concerns.     LARISA Sawyer MD  Interventional Cardiology   11/10/2022  15:18 EST

## 2022-11-17 RX ORDER — LOSARTAN POTASSIUM 25 MG/1
25 TABLET ORAL DAILY
Qty: 90 TABLET | Refills: 2 | Status: SHIPPED | OUTPATIENT
Start: 2022-11-17

## 2022-11-28 RX ORDER — LOSARTAN POTASSIUM 25 MG/1
25 TABLET ORAL DAILY
Qty: 30 TABLET | Refills: 3 | OUTPATIENT
Start: 2022-11-28

## 2022-12-06 ENCOUNTER — HOSPITAL ENCOUNTER (OUTPATIENT)
Dept: MAMMOGRAPHY | Facility: HOSPITAL | Age: 57
Discharge: HOME | End: 2022-12-06
Admitting: OBSTETRICS & GYNECOLOGY

## 2022-12-06 DIAGNOSIS — Z12.31 VISIT FOR SCREENING MAMMOGRAM: ICD-10-CM

## 2022-12-06 PROCEDURE — 77063 BREAST TOMOSYNTHESIS BI: CPT

## 2022-12-06 PROCEDURE — 77067 SCR MAMMO BI INCL CAD: CPT | Performed by: RADIOLOGY

## 2022-12-06 PROCEDURE — 77063 BREAST TOMOSYNTHESIS BI: CPT | Performed by: RADIOLOGY

## 2022-12-06 PROCEDURE — 77067 SCR MAMMO BI INCL CAD: CPT

## 2023-02-28 ENCOUNTER — TELEPHONE (OUTPATIENT)
Dept: CARDIOLOGY | Facility: CLINIC | Age: 58
End: 2023-02-28
Payer: COMMERCIAL

## 2023-02-28 DIAGNOSIS — I25.10 CORONARY ARTERIOSCLEROSIS: Primary | ICD-10-CM

## 2023-02-28 RX ORDER — NITROGLYCERIN 0.4 MG/1
TABLET SUBLINGUAL
Qty: 100 TABLET | Refills: 11 | Status: SHIPPED | OUTPATIENT
Start: 2023-02-28

## 2023-02-28 NOTE — TELEPHONE ENCOUNTER
Pt called stating that her Nitro has  and was asking if she still needs t have it on hand. If so Pt was asking for refills. Please advise

## 2023-09-25 NOTE — DISCHARGE INSTRUCTIONS
Radial Artery Coronary Angiogram After Care    Refer to this sheet in the next few weeks. These instructions provide you with information on caring for yourself after your procedure. Your health care provider may also give you more specific instructions. Your treatment has been planned according to current medical practices, but problems sometimes occur. Call your health care provider if you have any problems or questions after your procedure.       Home Care Instructions:  · You may shower the day after the procedure. Remove the bandage (dressing) and gently wash the site with plain soap and water. Gently pat the site dry. You may apply a band aid daily for 2 days if desired.    · Do not apply powder or lotion to the site.  · Do not submerge the affected site in water for 3 to 5 days or until the site is completely healed.   · Do not flex or bend the affected arm for 24 hours.  · Do not lift, push or pull anything over 10 pounds for 2 days after your procedure.  · Do not operate machinery or power tools for 24 hours.  · Inspect the site at least twice daily. You may notice some bruising at the site and it may be tender for 1 to 2 weeks.     · Increase your fluid intake for the next 2 days.    · Keep arm elevated for 24 hours.  For the remainder of the day, keep your arm in the “Pledge of Allegiance” position when up and about.    · Limit your activity for the next 48 hours and avoid strenuous activity as directed by your physician.   · Cardiac Rehab may or may not be ordered.  Please consult with your physician  · You may drive 24 hours after the procedure unless otherwise instructed by your caregiver.  · A responsible adult should be with you for the first 24 hours after you arrive home.   · Do not make any important legal decisions or sign legal papers for 24 hours. Do not drink alcohol for 24 hours.    · Take medicines only as directed by your health care provider.  Blood thinners may be prescribed after your  Upon reassessment, patient reports PRN Atarax effective. procedure to improve blood flow through the stent.    · Metformin or any medications containing Metformin should not be taken for 48 hours after your procedure.    · Eat a heart-healthy diet. This should include plenty of fresh fruits and vegetables. Meat should be lean cuts. Avoid the following types of food:    ¨ Food that is high in salt.    ¨ Canned or highly processed food.    ¨ Food that is high in saturated fat or sugar.    ¨ Fried food.    · Make any other lifestyle changes recommended by your health care provider. This may include:    ¨ Not using any tobacco products including cigarettes, chewing tobacco, or electronic cigarettes.   ¨ Managing your weight.    ¨ Getting regular exercise.    ¨ Managing your blood pressure.    ¨ Limiting your alcohol intake.    ¨ Managing other health problems, such as diabetes.    · Keep all follow-up visits as directed by your health care provider.      Call Your Doctor If:  · You have unusual pain at the radial/ulnar (wrist) site.  · You have redness, warmth, swelling, or pain at the radial/ulnar (wrist) site.  · You have drainage (other than a small amount of blood on the dressing).  · `You have chills or a fever > 101.  · Your arm becomes pale or dark, cool, tingly, or numb.  · You develop chest pain, shortness of breath, feel faint or pass out.    · You have heavy bleeding from the site, hold pressure on the site for 20 minutes.  If the bleeding stops, apply a fresh bandage and call your cardiologist.  However, if you continue to have bleeding, call 911.

## 2023-10-02 RX ORDER — CARVEDILOL 3.12 MG/1
TABLET ORAL
Qty: 180 TABLET | Refills: 3 | Status: SHIPPED | OUTPATIENT
Start: 2023-10-02

## 2023-11-08 ENCOUNTER — OFFICE VISIT (OUTPATIENT)
Dept: CARDIOLOGY | Facility: CLINIC | Age: 58
End: 2023-11-08
Payer: COMMERCIAL

## 2023-11-08 VITALS
RESPIRATION RATE: 16 BRPM | WEIGHT: 164 LBS | HEIGHT: 60 IN | DIASTOLIC BLOOD PRESSURE: 64 MMHG | SYSTOLIC BLOOD PRESSURE: 124 MMHG | OXYGEN SATURATION: 97 % | BODY MASS INDEX: 32.2 KG/M2 | HEART RATE: 64 BPM

## 2023-11-08 DIAGNOSIS — E78.2 MIXED HYPERLIPIDEMIA: ICD-10-CM

## 2023-11-08 DIAGNOSIS — E66.9 OBESITY (BMI 30-39.9): ICD-10-CM

## 2023-11-08 DIAGNOSIS — I10 PRIMARY HYPERTENSION: ICD-10-CM

## 2023-11-08 DIAGNOSIS — I25.10 CORONARY ARTERIOSCLEROSIS: Primary | ICD-10-CM

## 2023-11-08 PROCEDURE — 99213 OFFICE O/P EST LOW 20 MIN: CPT | Performed by: INTERNAL MEDICINE

## 2023-11-08 RX ORDER — ASPIRIN 81 MG/1
81 TABLET ORAL DAILY
Qty: 180 TABLET | Refills: 3 | Status: SHIPPED | OUTPATIENT
Start: 2023-11-08

## 2023-11-08 RX ORDER — CARVEDILOL 3.12 MG/1
3.12 TABLET ORAL 2 TIMES DAILY
Qty: 180 TABLET | Refills: 3 | Status: SHIPPED | OUTPATIENT
Start: 2023-11-08

## 2023-11-08 RX ORDER — ATORVASTATIN CALCIUM 80 MG/1
80 TABLET, FILM COATED ORAL NIGHTLY
Qty: 90 TABLET | Refills: 3 | Status: SHIPPED | OUTPATIENT
Start: 2023-11-08

## 2023-11-08 RX ORDER — LOSARTAN POTASSIUM 25 MG/1
25 TABLET ORAL DAILY
Qty: 90 TABLET | Refills: 3 | Status: SHIPPED | OUTPATIENT
Start: 2023-11-08

## 2023-11-08 NOTE — PROGRESS NOTES
[facility] Cardiology Office Follow Up Note    Sapna Ng  [id number]  2023    Primary Care Provider: Letty Diaz MD    Chief Complaint: Routine follow-up    History of Present Illness:   Mrs. Sapna Ng is a 58 y.o. female who presents to the Cardiology Clinic for routine follow-up. The patient has a past medical history significant for hypertension.  She has a past cardiac history significant for severe one-vessel coronary artery disease presenting with an anterior STEMI in .  She underwent PCI to the mid LAD with placement of a 3.0 x 28 mm Xience CLARISSA at that time.  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  A post MI echocardiogram at that time showed preserved LV systolic function, with akinesis of the apex and anteroseptal wall.  Today, the patient reports she is doing well cardiac standpoint.  She has not had any significant chest pain or exertional chest discomfort.  No significant exertional dyspnea.  She continues to tolerate her current cardiac medications without difficulty.  No new complaints today.     Past Cardiac Testin. Last Coronary Angio: 2021              1.  Thrombotic occlusion of the mid LAD is culprit for anterior STEMI                          -Successful PCI with placement of a 3.0 x 28 mm Xience CLARISSA              2.  Mild luminal irregularities in the RCA and LCx              3.  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 Prior Stress Testing: None  3. Last Echo: 2021              1.  LVEF 55-60%              2.  Akinesis of the apex and anteroseptal wall              3.  Grade 1 diastolic dysfunction  4. Prior Holter Monitor: 14-day monitor 10/21              1.  The predominant rhythm is sinus rhythm with average heart rate 65 bpm              2.  No sustained arrhythmias              3.  Rare ventricular ectopy              4.  No  correlation of symptoms to ectopy    Review of Systems:   Review of Systems   Constitutional:  Negative for activity change, appetite change, chills, diaphoresis, fatigue, fever, unexpected weight gain and unexpected weight loss.   Eyes:  Negative for blurred vision and double vision.   Respiratory:  Negative for cough, chest tightness, shortness of breath and wheezing.    Cardiovascular:  Negative for chest pain, palpitations and leg swelling.   Gastrointestinal:  Negative for abdominal pain, anal bleeding, blood in stool and GERD.   Endocrine: Negative for cold intolerance and heat intolerance.   Genitourinary:  Negative for hematuria.   Neurological:  Negative for dizziness, syncope, weakness and light-headedness.   Hematological:  Does not bruise/bleed easily.   Psychiatric/Behavioral:  Negative for depressed mood and stress. The patient is not nervous/anxious.        I have reviewed and/or updated the patient's past medical, past surgical, family, social history, problem list and allergies as appropriate.     Medications:     Current Outpatient Medications:     ALLERGY SERUM INJECTION, Inject  under the skin into the appropriate area as directed Every 14 (Fourteen) Days., Disp: , Rfl:     aspirin 81 MG EC tablet, Take 1 tablet by mouth Daily., Disp: 180 tablet, Rfl: 3    atorvastatin (LIPITOR) 80 MG tablet, Take 1 tablet by mouth Every Night., Disp: 90 tablet, Rfl: 3    carvedilol (COREG) 3.125 MG tablet, Take 1 tablet by mouth 2 (Two) Times a Day., Disp: 180 tablet, Rfl: 3    cetirizine (zyrTEC) 10 MG tablet, Take 1 tablet by mouth every night at bedtime., Disp: , Rfl:     Cholecalciferol (Vitamin D3) 50 MCG (2000 UT) capsule, Take 1 capsule by mouth Daily., Disp: , Rfl:     coenzyme Q10 100 MG capsule, Take 1 capsule by mouth Daily., Disp: , Rfl:     ferrous sulfate 140 (45 Fe) MG tablet controlled-release tablet, Take 1 tablet by mouth Daily., Disp: , Rfl:     losartan (COZAAR) 25 MG tablet, Take 1 tablet by  "mouth Daily., Disp: 90 tablet, Rfl: 3    montelukast (SINGULAIR) 10 MG tablet, Take 1 tablet by mouth Every Night., Disp: , Rfl:     nitroglycerin (NITROSTAT) 0.4 MG SL tablet, 1 under the tongue as needed for angina, may repeat q5mins for up three doses, Disp: 100 tablet, Rfl: 11    Physical Exam:  Vital Signs:   Vitals:    11/08/23 1607   BP: 124/64   BP Location: Left arm   Patient Position: Sitting   Pulse: 64   Resp: 16   SpO2: 97%   Weight: 74.4 kg (164 lb)   Height: 152.4 cm (60\")       Physical Exam  Constitutional:       General: She is not in acute distress.     Appearance: Normal appearance. She is well-developed. She is not diaphoretic.   HENT:      Head: Normocephalic and atraumatic.   Eyes:      General: No scleral icterus.     Pupils: Pupils are equal, round, and reactive to light.   Neck:      Trachea: No tracheal deviation.   Cardiovascular:      Rate and Rhythm: Normal rate and regular rhythm.      Heart sounds: Normal heart sounds. No murmur heard.     No friction rub. No gallop.      Comments: Normal JVD.    Pulmonary:      Effort: Pulmonary effort is normal. No respiratory distress.      Breath sounds: Normal breath sounds. No stridor. No wheezing or rales.   Chest:      Chest wall: No tenderness.   Abdominal:      General: Bowel sounds are normal. There is no distension.      Palpations: Abdomen is soft.      Tenderness: There is no abdominal tenderness. There is no guarding or rebound.   Musculoskeletal:         General: No swelling. Normal range of motion.      Cervical back: Neck supple. No tenderness.   Lymphadenopathy:      Cervical: No cervical adenopathy.   Skin:     General: Skin is warm and dry.      Findings: No erythema.   Neurological:      General: No focal deficit present.      Mental Status: She is alert and oriented to person, place, and time.   Psychiatric:         Mood and Affect: Mood normal.         Behavior: Behavior normal.         Results Review:   I reviewed the " patient's new clinical results.        Assessment / Plan:     1.  Coronary artery disease  --Initially presented with anterior STEMI in 4/21, underwent PCI to the mid LAD, mild nonobstructive residual disease in the LAD and LCx  -- Continues to do well without anginal symptoms  -- Continue aspirin monotherapy  --Continue high intensity statin  --Continue metoprolol and losartan  --Follow-up in 1 year, sooner if required     2.  Hypertension  -- BP remains adequately controlled     3.  Dyslipidemia  -- Lipid profile in 4/21 showed LDL 90, HDL 30, triglycerides 147  -- Continue high intensity statin  --Repeat lipid profile per PCP     4.  Obesity, BMI 33  -- Weight loss advised       Follow Up:   Return in about 1 year (around 11/8/2024).      Thank you for allowing me to participate in the care of your patient. Please to not hesitate to contact me with additional questions or concerns.     LARISA Sawyer MD  Interventional Cardiology   11/08/2023  15:59 EST

## 2023-12-26 RX ORDER — LOSARTAN POTASSIUM 25 MG/1
25 TABLET ORAL DAILY
Qty: 30 TABLET | Refills: 5 | Status: SHIPPED | OUTPATIENT
Start: 2023-12-26

## 2024-01-15 ENCOUNTER — TRANSCRIBE ORDERS (OUTPATIENT)
Dept: ADMINISTRATIVE | Facility: HOSPITAL | Age: 59
End: 2024-01-15
Payer: COMMERCIAL

## 2024-01-15 DIAGNOSIS — Z12.31 SCREENING MAMMOGRAM FOR BREAST CANCER: Primary | ICD-10-CM

## 2024-01-16 ENCOUNTER — LAB (OUTPATIENT)
Dept: LAB | Facility: HOSPITAL | Age: 59
End: 2024-01-16
Payer: COMMERCIAL

## 2024-01-16 ENCOUNTER — TRANSCRIBE ORDERS (OUTPATIENT)
Dept: LAB | Facility: HOSPITAL | Age: 59
End: 2024-01-16
Payer: COMMERCIAL

## 2024-01-16 DIAGNOSIS — I10 PRIMARY HYPERTENSION: Primary | ICD-10-CM

## 2024-01-16 DIAGNOSIS — I10 PRIMARY HYPERTENSION: ICD-10-CM

## 2024-01-16 LAB
ALBUMIN SERPL-MCNC: 4.4 G/DL (ref 3.5–5.2)
ALBUMIN/GLOB SERPL: 1.4 G/DL
ALP SERPL-CCNC: 116 U/L (ref 39–117)
ALT SERPL W P-5'-P-CCNC: 43 U/L (ref 1–33)
ANION GAP SERPL CALCULATED.3IONS-SCNC: 11.8 MMOL/L (ref 5–15)
AST SERPL-CCNC: 28 U/L (ref 1–32)
BASOPHILS # BLD AUTO: 0.06 10*3/MM3 (ref 0–0.2)
BASOPHILS NFR BLD AUTO: 0.9 % (ref 0–1.5)
BILIRUB SERPL-MCNC: 0.6 MG/DL (ref 0–1.2)
BUN SERPL-MCNC: 16 MG/DL (ref 6–20)
BUN/CREAT SERPL: 18.4 (ref 7–25)
CALCIUM SPEC-SCNC: 9.5 MG/DL (ref 8.6–10.5)
CHLORIDE SERPL-SCNC: 105 MMOL/L (ref 98–107)
CHOLEST SERPL-MCNC: 170 MG/DL (ref 0–200)
CO2 SERPL-SCNC: 24.2 MMOL/L (ref 22–29)
CREAT SERPL-MCNC: 0.87 MG/DL (ref 0.57–1)
DEPRECATED RDW RBC AUTO: 40.1 FL (ref 37–54)
EGFRCR SERPLBLD CKD-EPI 2021: 76.9 ML/MIN/1.73
EOSINOPHIL # BLD AUTO: 0.58 10*3/MM3 (ref 0–0.4)
EOSINOPHIL NFR BLD AUTO: 8.8 % (ref 0.3–6.2)
ERYTHROCYTE [DISTWIDTH] IN BLOOD BY AUTOMATED COUNT: 12.8 % (ref 12.3–15.4)
GLOBULIN UR ELPH-MCNC: 3.1 GM/DL
GLUCOSE SERPL-MCNC: 105 MG/DL (ref 65–99)
HCT VFR BLD AUTO: 40.6 % (ref 34–46.6)
HDLC SERPL-MCNC: 42 MG/DL (ref 40–60)
HGB BLD-MCNC: 14.4 G/DL (ref 12–15.9)
IMM GRANULOCYTES # BLD AUTO: 0.02 10*3/MM3 (ref 0–0.05)
IMM GRANULOCYTES NFR BLD AUTO: 0.3 % (ref 0–0.5)
LDLC SERPL CALC-MCNC: 104 MG/DL (ref 0–100)
LDLC/HDLC SERPL: 2.41 {RATIO}
LYMPHOCYTES # BLD AUTO: 1.52 10*3/MM3 (ref 0.7–3.1)
LYMPHOCYTES NFR BLD AUTO: 23.2 % (ref 19.6–45.3)
MCH RBC QN AUTO: 31 PG (ref 26.6–33)
MCHC RBC AUTO-ENTMCNC: 35.5 G/DL (ref 31.5–35.7)
MCV RBC AUTO: 87.5 FL (ref 79–97)
MONOCYTES # BLD AUTO: 0.51 10*3/MM3 (ref 0.1–0.9)
MONOCYTES NFR BLD AUTO: 7.8 % (ref 5–12)
NEUTROPHILS NFR BLD AUTO: 3.87 10*3/MM3 (ref 1.7–7)
NEUTROPHILS NFR BLD AUTO: 59 % (ref 42.7–76)
NRBC BLD AUTO-RTO: 0 /100 WBC (ref 0–0.2)
PLATELET # BLD AUTO: 229 10*3/MM3 (ref 140–450)
PMV BLD AUTO: 11.5 FL (ref 6–12)
POTASSIUM SERPL-SCNC: 4.2 MMOL/L (ref 3.5–5.2)
PROT SERPL-MCNC: 7.5 G/DL (ref 6–8.5)
RBC # BLD AUTO: 4.64 10*6/MM3 (ref 3.77–5.28)
SODIUM SERPL-SCNC: 141 MMOL/L (ref 136–145)
TRIGL SERPL-MCNC: 134 MG/DL (ref 0–150)
TSH SERPL DL<=0.05 MIU/L-ACNC: 2.51 UIU/ML (ref 0.27–4.2)
VLDLC SERPL-MCNC: 24 MG/DL (ref 5–40)
WBC NRBC COR # BLD AUTO: 6.56 10*3/MM3 (ref 3.4–10.8)

## 2024-01-16 PROCEDURE — 80061 LIPID PANEL: CPT

## 2024-01-16 PROCEDURE — 36415 COLL VENOUS BLD VENIPUNCTURE: CPT

## 2024-01-16 PROCEDURE — 80050 GENERAL HEALTH PANEL: CPT

## 2024-01-19 ENCOUNTER — TELEPHONE (OUTPATIENT)
Dept: CARDIOLOGY | Facility: CLINIC | Age: 59
End: 2024-01-19
Payer: COMMERCIAL

## 2024-01-19 NOTE — TELEPHONE ENCOUNTER
Pt called stating that due to her having Myalgia with Lipitor her PCP is switching her to Crestor and wanted to make sure this is okay.

## 2024-01-22 RX ORDER — ROSUVASTATIN CALCIUM 40 MG/1
40 TABLET, COATED ORAL DAILY
COMMUNITY

## 2024-02-09 ENCOUNTER — HOSPITAL ENCOUNTER (OUTPATIENT)
Dept: MAMMOGRAPHY | Facility: HOSPITAL | Age: 59
Discharge: HOME | End: 2024-02-09
Admitting: FAMILY MEDICINE
Payer: COMMERCIAL

## 2024-02-09 DIAGNOSIS — Z12.31 SCREENING MAMMOGRAM FOR BREAST CANCER: ICD-10-CM

## 2024-02-09 PROCEDURE — 77063 BREAST TOMOSYNTHESIS BI: CPT

## 2024-02-09 PROCEDURE — 77067 SCR MAMMO BI INCL CAD: CPT

## 2024-03-12 NOTE — PROGRESS NOTES
Problem: Pain  Goal: Acceptable pain level achieved/maintained at rest using appropriate pain scale for the patient  Outcome: Monitoring/Evaluating progress  Goal: Acceptable pain level achieved/maintained with activity using appropriate pain scale for the patient  Outcome: Monitoring/Evaluating progress      Pt was seen today in [facility] for a Phase 2 visit.  Vital signs and session notes recorded in [employer] and will be scanned into Epic by HIM.

## 2024-04-02 ENCOUNTER — LAB (OUTPATIENT)
Dept: LAB | Facility: HOSPITAL | Age: 59
End: 2024-04-02
Payer: COMMERCIAL

## 2024-04-02 ENCOUNTER — TRANSCRIBE ORDERS (OUTPATIENT)
Dept: LAB | Facility: HOSPITAL | Age: 59
End: 2024-04-02
Payer: COMMERCIAL

## 2024-04-02 DIAGNOSIS — E78.5 HYPERLIPIDEMIA LDL GOAL <130: ICD-10-CM

## 2024-04-02 DIAGNOSIS — E78.5 HYPERLIPIDEMIA LDL GOAL <130: Primary | ICD-10-CM

## 2024-04-02 LAB
ALBUMIN SERPL-MCNC: 4.2 G/DL (ref 3.5–5.2)
ALBUMIN/GLOB SERPL: 1.6 G/DL
ALP SERPL-CCNC: 91 U/L (ref 39–117)
ALT SERPL W P-5'-P-CCNC: 28 U/L (ref 1–33)
ANION GAP SERPL CALCULATED.3IONS-SCNC: 11.9 MMOL/L (ref 5–15)
AST SERPL-CCNC: 28 U/L (ref 1–32)
BILIRUB SERPL-MCNC: 0.4 MG/DL (ref 0–1.2)
BUN SERPL-MCNC: 14 MG/DL (ref 6–20)
BUN/CREAT SERPL: 13.2 (ref 7–25)
CALCIUM SPEC-SCNC: 9.1 MG/DL (ref 8.6–10.5)
CHLORIDE SERPL-SCNC: 105 MMOL/L (ref 98–107)
CHOLEST SERPL-MCNC: 125 MG/DL (ref 0–200)
CO2 SERPL-SCNC: 23.1 MMOL/L (ref 22–29)
CREAT SERPL-MCNC: 1.06 MG/DL (ref 0.57–1)
EGFRCR SERPLBLD CKD-EPI 2021: 60.6 ML/MIN/1.73
GLOBULIN UR ELPH-MCNC: 2.6 GM/DL
GLUCOSE SERPL-MCNC: 112 MG/DL (ref 65–99)
HDLC SERPL-MCNC: 41 MG/DL (ref 40–60)
LDLC SERPL CALC-MCNC: 62 MG/DL (ref 0–100)
LDLC/HDLC SERPL: 1.46 {RATIO}
POTASSIUM SERPL-SCNC: 4.5 MMOL/L (ref 3.5–5.2)
PROT SERPL-MCNC: 6.8 G/DL (ref 6–8.5)
SODIUM SERPL-SCNC: 140 MMOL/L (ref 136–145)
TRIGL SERPL-MCNC: 121 MG/DL (ref 0–150)
VLDLC SERPL-MCNC: 22 MG/DL (ref 5–40)

## 2024-04-02 PROCEDURE — 80053 COMPREHEN METABOLIC PANEL: CPT

## 2024-04-02 PROCEDURE — 36415 COLL VENOUS BLD VENIPUNCTURE: CPT

## 2024-04-02 PROCEDURE — 80061 LIPID PANEL: CPT

## 2024-06-07 RX ORDER — LOSARTAN POTASSIUM 25 MG/1
25 TABLET ORAL DAILY
Qty: 90 TABLET | Refills: 1 | Status: SHIPPED | OUTPATIENT
Start: 2024-06-07

## 2024-06-21 ENCOUNTER — TELEMEDICINE (OUTPATIENT)
Dept: SLEEP MEDICINE | Facility: CLINIC | Age: 59
End: 2024-06-21
Payer: COMMERCIAL

## 2024-06-21 VITALS — HEIGHT: 60 IN | BODY MASS INDEX: 33.38 KG/M2 | WEIGHT: 170 LBS

## 2024-06-21 DIAGNOSIS — G47.33 OSA (OBSTRUCTIVE SLEEP APNEA): Primary | ICD-10-CM

## 2024-06-21 PROCEDURE — 99213 OFFICE O/P EST LOW 20 MIN: CPT | Performed by: NURSE PRACTITIONER

## 2024-06-21 NOTE — PROGRESS NOTES
Chief Complaint:   Chief Complaint   Patient presents with    Follow-up       HPI:    Sapna Ng is a 58 y.o. female here for follow-up of sleep apnea.  Patient was last seen 6/23/2023.  Patient continues to do well with CPAP therapy.  Patient is sleeping 7 hours nightly.  Patient does put her [place] score at 7/24 but feels she is still sleepy.  We did speak today about sleep hygiene and increasing exercise and watching her diet.  Patient states now that she is out of school for the summer she is now started on this path.  If this should increase in any way she will let me know otherwise I will see her back in a year.        Current medications are:   Current Outpatient Medications:     ALLERGY SERUM INJECTION, Inject  under the skin into the appropriate area as directed Every 14 (Fourteen) Days., Disp: , Rfl:     aspirin 81 MG EC tablet, Take 1 tablet by mouth Daily., Disp: 180 tablet, Rfl: 3    carvedilol (COREG) 3.125 MG tablet, Take 1 tablet by mouth 2 (Two) Times a Day., Disp: 180 tablet, Rfl: 3    cetirizine (zyrTEC) 10 MG tablet, Take 1 tablet by mouth every night at bedtime., Disp: , Rfl:     Cholecalciferol (Vitamin D3) 50 MCG (2000 UT) capsule, Take 1 capsule by mouth Daily., Disp: , Rfl:     coenzyme Q10 100 MG capsule, Take 1 capsule by mouth Daily., Disp: , Rfl:     ferrous sulfate 140 (45 Fe) MG tablet controlled-release tablet, Take 1 tablet by mouth Daily., Disp: , Rfl:     losartan (COZAAR) 25 MG tablet, TAKE 1 TABLET DAILY, Disp: 90 tablet, Rfl: 1    nitroglycerin (NITROSTAT) 0.4 MG SL tablet, 1 under the tongue as needed for angina, may repeat q5mins for up three doses, Disp: 100 tablet, Rfl: 11    rosuvastatin (Crestor) 40 MG tablet, Take 1 tablet by mouth Daily., Disp: , Rfl: .      The patient's relevant past medical, surgical, family and social history were reviewed and updated in Epic as appropriate.       Review of Systems   Respiratory:  Positive for apnea.     Allergic/Immunologic: Positive for environmental allergies.   Psychiatric/Behavioral:  Positive for sleep disturbance.    All other systems reviewed and are negative.        Objective:    Physical Exam  Constitutional:       Appearance: Normal appearance.   HENT:      Head: Normocephalic and atraumatic.      Mouth/Throat:      Comments: Class 4 airway  Pulmonary:      Effort: Pulmonary effort is normal. No respiratory distress.   Neurological:      Mental Status: She is alert and oriented to person, place, and time.   Psychiatric:         Mood and Affect: Mood normal.         Behavior: Behavior normal.         Thought Content: Thought content normal.         Judgment: Judgment normal.         CPAP Report  90/90 days of use  Greater than 4-hour use 98.9  9% pressure 10.4  AHI of 0.7  Setting 8-20    The patient continues to use and benefit from CPAP therapy.    ASSESSMENT/PLAN    Diagnoses and all orders for this visit:    1. DORITA (obstructive sleep apnea) (Primary)  -     PAP Therapy        Counseled patient regarding multimodal approach with healthy nutrition, healthy sleep, regular physical activity, social activities, counseling, and medications. Encouraged to practice lateral sleep position. Avoid alcohol and sedatives close to bedtime.    Refill supplies x 1 year.  Return to clinic 1 year or sooner if symptoms warrant.  The patient is located in [facility] at home. The patient presents today for telehealth service.  This service was conducted via audio/video technology through a secure "[employer] video visit connection through Epic.  This provider is located in [facility].  Patient stated they are in a secure environment for the session.  Patient's condition being diagnosed/treated is appropriate for telemedicine.  The provider identified himself as well as his credentials.  The patient, and/or patient's guardian, consent to be seen remotely, and when consent is given they understanding that the  consent allows for patient identifiable information to be sent to a third-party as needed.  They may refuse to be seen remotely at any time.  The electronic data is encrypted and password protected, and the patient and/or guardian has been advised of the potential risk to privacy not withstanding such measures.  Patient identifiers used: Name and date of birth.   I have reviewed the results of my evaluation and impression and discussed my recommendations in detail with the patient.      Signed by  WESTLEY Wong    June 21, 2024      CC: Letty Diaz MD         No ref. provider found

## 2024-09-30 ENCOUNTER — TELEPHONE (OUTPATIENT)
Dept: CARDIOLOGY | Facility: CLINIC | Age: 59
End: 2024-09-30
Payer: COMMERCIAL

## 2024-09-30 NOTE — TELEPHONE ENCOUNTER
REQUEST FOR CARDIAC CLEARANCE    Caller name: Sapna Ng     Phone Number: [phone]    Surgeon's name: AMAURY HOOVER    Type of planned surgery: COLONOSCOPY    Date of planned surgery: 10-8-24    Type of anesthesia: NOT SURE    Have you been experiencing chest pain or shortness of breath? NO    Is your doctor requesting for you to stop any of your medications prior to your surgery? LOW DOSE ASPIRIN 81 MG    Where should we fax the clearance to?   PHONE: [phone]

## 2024-10-15 ENCOUNTER — TRANSCRIBE ORDERS (OUTPATIENT)
Dept: LAB | Facility: HOSPITAL | Age: 59
End: 2024-10-15
Payer: COMMERCIAL

## 2024-10-15 ENCOUNTER — LAB (OUTPATIENT)
Dept: LAB | Facility: HOSPITAL | Age: 59
End: 2024-10-15
Payer: COMMERCIAL

## 2024-10-15 DIAGNOSIS — E78.5 HYPERLIPIDEMIA, UNSPECIFIED HYPERLIPIDEMIA TYPE: Primary | ICD-10-CM

## 2024-10-15 DIAGNOSIS — E78.5 HYPERLIPIDEMIA, UNSPECIFIED HYPERLIPIDEMIA TYPE: ICD-10-CM

## 2024-10-15 LAB
ALBUMIN SERPL-MCNC: 4 G/DL (ref 3.5–5.2)
ALBUMIN/GLOB SERPL: 1.5 G/DL
ALP SERPL-CCNC: 93 U/L (ref 39–117)
ALT SERPL W P-5'-P-CCNC: 29 U/L (ref 1–33)
ANION GAP SERPL CALCULATED.3IONS-SCNC: 8.7 MMOL/L (ref 5–15)
AST SERPL-CCNC: 27 U/L (ref 1–32)
BILIRUB SERPL-MCNC: 0.4 MG/DL (ref 0–1.2)
BUN SERPL-MCNC: 15 MG/DL (ref 8–23)
BUN/CREAT SERPL: 11.9 (ref 7–25)
CALCIUM SPEC-SCNC: 9.2 MG/DL (ref 8.6–10.5)
CHLORIDE SERPL-SCNC: 106 MMOL/L (ref 98–107)
CHOLEST SERPL-MCNC: 118 MG/DL (ref 0–200)
CO2 SERPL-SCNC: 23.3 MMOL/L (ref 22–29)
CREAT SERPL-MCNC: 1.26 MG/DL (ref 0.57–1)
EGFRCR SERPLBLD CKD-EPI 2021: 49 ML/MIN/1.73
GLOBULIN UR ELPH-MCNC: 2.7 GM/DL
GLUCOSE SERPL-MCNC: 123 MG/DL (ref 65–99)
HDLC SERPL-MCNC: 39 MG/DL (ref 40–60)
LDLC SERPL CALC-MCNC: 54 MG/DL (ref 0–100)
LDLC/HDLC SERPL: 1.28 {RATIO}
POTASSIUM SERPL-SCNC: 4.3 MMOL/L (ref 3.5–5.2)
PROT SERPL-MCNC: 6.7 G/DL (ref 6–8.5)
SODIUM SERPL-SCNC: 138 MMOL/L (ref 136–145)
TRIGL SERPL-MCNC: 145 MG/DL (ref 0–150)
VLDLC SERPL-MCNC: 25 MG/DL (ref 5–40)

## 2024-10-15 PROCEDURE — 80053 COMPREHEN METABOLIC PANEL: CPT

## 2024-10-15 PROCEDURE — 80061 LIPID PANEL: CPT

## 2024-10-15 PROCEDURE — 36415 COLL VENOUS BLD VENIPUNCTURE: CPT

## 2024-11-05 ENCOUNTER — OFFICE VISIT (OUTPATIENT)
Dept: CARDIOLOGY | Facility: CLINIC | Age: 59
End: 2024-11-05
Payer: COMMERCIAL

## 2024-11-05 VITALS
HEIGHT: 60 IN | WEIGHT: 179.4 LBS | HEART RATE: 67 BPM | DIASTOLIC BLOOD PRESSURE: 72 MMHG | OXYGEN SATURATION: 98 % | BODY MASS INDEX: 35.22 KG/M2 | SYSTOLIC BLOOD PRESSURE: 130 MMHG

## 2024-11-05 DIAGNOSIS — I10 PRIMARY HYPERTENSION: ICD-10-CM

## 2024-11-05 DIAGNOSIS — I25.10 CORONARY ARTERIOSCLEROSIS: Primary | ICD-10-CM

## 2024-11-05 DIAGNOSIS — E78.2 MIXED HYPERLIPIDEMIA: ICD-10-CM

## 2024-11-05 DIAGNOSIS — E66.9 OBESITY (BMI 30-39.9): ICD-10-CM

## 2024-11-05 PROCEDURE — 99213 OFFICE O/P EST LOW 20 MIN: CPT | Performed by: INTERNAL MEDICINE

## 2024-11-05 RX ORDER — LOSARTAN POTASSIUM 50 MG/1
50 TABLET ORAL DAILY
COMMUNITY

## 2024-11-05 NOTE — PROGRESS NOTES
[facility] Cardiology Office Follow Up Note    Sapna Ng  [id number]  2024    Primary Care Provider: Letty Diaz MD    Chief Complaint: Routine follow-up    History of Present Illness:   Mrs. Sapna Ng is a 59.o. female who presents to the Cardiology Clinic for routine follow-up. The patient has a past medical history significant for hypertension.  She has a past cardiac history significant for severe one-vessel coronary artery disease presenting with an anterior STEMI in .  She underwent PCI to the mid LAD with placement of a 3.0 x 28 mm Xience CLARISSA at that time.  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  A post MI echocardiogram at that time showed preserved LV systolic function, with akinesis of the apex and anteroseptal wall.  Today, the patient reports she is doing well from a cardiac standpoint.  No recent episodes of chest pain or exertional chest discomfort.  She denies exertional dyspnea.  She is tolerating her current cardiac medications without difficulty.  No specific complaints.    Past Cardiac Testin. Last Coronary Angio: 2021              1.  Thrombotic occlusion of the mid LAD is culprit for anterior STEMI                          -Successful PCI with placement of a 3.0 x 28 mm Xience CLARISSA              2.  Mild luminal irregularities in the RCA and LCx              3.  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 Prior Stress Testing: None  3. Last Echo: 2021              1.  LVEF 55-60%              2.  Akinesis of the apex and anteroseptal wall              3.  Grade 1 diastolic dysfunction  4. Prior Holter Monitor: 14-day monitor 10/21              1.  The predominant rhythm is sinus rhythm with average heart rate 65 bpm              2.  No sustained arrhythmias              3.  Rare ventricular ectopy              4.  No correlation of  symptoms to ectopy    Review of Systems:   Review of Systems   Constitutional:  Negative for activity change, appetite change, chills, diaphoresis, fatigue, fever, unexpected weight gain and unexpected weight loss.   Eyes:  Negative for blurred vision and double vision.   Respiratory:  Negative for cough, chest tightness, shortness of breath and wheezing.    Cardiovascular:  Negative for chest pain, palpitations and leg swelling.   Gastrointestinal:  Negative for abdominal pain, anal bleeding, blood in stool and GERD.   Endocrine: Negative for cold intolerance and heat intolerance.   Genitourinary:  Negative for hematuria.   Neurological:  Negative for dizziness, syncope, weakness and light-headedness.   Hematological:  Does not bruise/bleed easily.   Psychiatric/Behavioral:  Negative for depressed mood and stress. The patient is not nervous/anxious.        I have reviewed and/or updated the patient's past medical, past surgical, family, social history, problem list and allergies as appropriate.     Medications:     Current Outpatient Medications:     ALLERGY SERUM INJECTION, Inject  under the skin into the appropriate area as directed Every 14 (Fourteen) Days., Disp: , Rfl:     aspirin 81 MG EC tablet, Take 1 tablet by mouth Daily., Disp: 180 tablet, Rfl: 3    carvedilol (COREG) 3.125 MG tablet, Take 1 tablet by mouth 2 (Two) Times a Day., Disp: 180 tablet, Rfl: 3    cetirizine (zyrTEC) 10 MG tablet, Take 1 tablet by mouth every night at bedtime., Disp: , Rfl:     Cholecalciferol (Vitamin D3) 50 MCG (2000 UT) capsule, Take 1 capsule by mouth Daily., Disp: , Rfl:     coenzyme Q10 100 MG capsule, Take 1 capsule by mouth Daily., Disp: , Rfl:     ferrous sulfate 140 (45 Fe) MG tablet controlled-release tablet, Take 1 tablet by mouth Daily. (Patient taking differently: Take 1 tablet by mouth Daily. Patient is taking every other day), Disp: , Rfl:     losartan (COZAAR) 50 MG tablet, Take 1 tablet by mouth Daily., Disp: ,  "Rfl:     nitroglycerin (NITROSTAT) 0.4 MG SL tablet, 1 under the tongue as needed for angina, may repeat q5mins for up three doses, Disp: 100 tablet, Rfl: 11    rosuvastatin (Crestor) 40 MG tablet, Take 1 tablet by mouth Daily., Disp: , Rfl:     Physical Exam:  Vital Signs:   Vitals:    11/05/24 1554   BP: 130/72   BP Location: Right arm   Patient Position: Sitting   Cuff Size: Adult   Pulse: 67   SpO2: 98%   Weight: 81.4 kg (179 lb 6.4 oz)   Height: 152.4 cm (60\")       Physical Exam  Constitutional:       General: She is not in acute distress.     Appearance: Normal appearance. She is well-developed. She is not diaphoretic.   HENT:      Head: Normocephalic and atraumatic.   Eyes:      General: No scleral icterus.     Pupils: Pupils are equal, round, and reactive to light.   Neck:      Trachea: No tracheal deviation.   Cardiovascular:      Rate and Rhythm: Normal rate and regular rhythm.      Heart sounds: Normal heart sounds. No murmur heard.     No friction rub. No gallop.      Comments: Normal JVD.  Pulmonary:      Effort: Pulmonary effort is normal. No respiratory distress.      Breath sounds: Normal breath sounds. No stridor. No wheezing or rales.   Chest:      Chest wall: No tenderness.   Abdominal:      General: Bowel sounds are normal. There is no distension.      Palpations: Abdomen is soft.      Tenderness: There is no abdominal tenderness. There is no guarding or rebound.   Musculoskeletal:         General: No swelling. Normal range of motion.      Cervical back: Neck supple. No tenderness.   Lymphadenopathy:      Cervical: No cervical adenopathy.   Skin:     General: Skin is warm and dry.      Findings: No erythema.   Neurological:      General: No focal deficit present.      Mental Status: She is alert and oriented to person, place, and time.   Psychiatric:         Mood and Affect: Mood normal.         Behavior: Behavior normal.         Results Review:   I reviewed the patient's new clinical " results.        Assessment / Plan:     1.  Coronary artery disease  --Initially presented with anterior STEMI in 4/21, underwent PCI to the mid LAD, mild nonobstructive residual disease in the LAD and LCx  -- No exertional anginal symptoms  -- Continue aspirin monotherapy  --Continue high intensity statin  --Continue metoprolol and losartan  -- Follow-up in 1 year, sooner if needed     2.  Hypertension  -- BP well-controlled     3.  Dyslipidemia  -- Lipid profile in 10/24 showed LDL 54, HDL 39, triglycerides 145  -- Continue high intensity statin     4.  Obesity, BMI 35  -- Weight loss advised    Follow Up:   Return in about 1 year (around 11/5/2025).      Thank you for allowing me to participate in the care of your patient. Please to not hesitate to contact me with additional questions or concerns.     LARISA Sawyer MD  Interventional Cardiology   11/05/2024  15:54 EST

## 2024-11-19 ENCOUNTER — TELEPHONE (OUTPATIENT)
Dept: CARDIOLOGY | Facility: CLINIC | Age: 59
End: 2024-11-19
Payer: COMMERCIAL

## 2024-11-19 NOTE — TELEPHONE ENCOUNTER
REQUEST FOR CARDIAC CLEARANCE    Caller name: ROMARIO     Phone Number: [phone]    Surgeon's name: SUNIL CASAREZ MD    Type of planned surgery: LAPAROSCOPIC ILEOCECETOMY     Date of planned surgery: 12.31.24    Type of anesthesia: GENERAL    Have you been experiencing chest pain or shortness of breath? NA    Is your doctor requesting for you to stop any of your medications prior to your surgery? NA    Where should we fax the clearance to? [phone]       ASKED THEIR OFFICE TO FAX OVER A CLEARANCE REQUEST, THEY SAID THEY TYPICALLY JUST CALL AND REQUEST, SO I JUST TYPED EVERYTHING FOR THE CLEARANCE ON MY END. PLEASE ADVISE.

## 2024-11-20 NOTE — TELEPHONE ENCOUNTER
Unless the patient is having any new previously undisclosed cardiac concerns or complaints, she may proceed with surgery without further cardiology workup.      Revised risk cardiac index:    Estimated Risk of Adverse Outcome with Non-cardiac Surgery  Low Risk  Estimated Rate of Myocardial Infarction, Pulmonary Edema, Ventricular Fibrillation, Cardiac Arrest, or Complete Heart Block  0.9 %

## 2024-12-16 RX ORDER — CARVEDILOL 3.12 MG/1
3.12 TABLET ORAL 2 TIMES DAILY
Qty: 180 TABLET | Refills: 3 | Status: SHIPPED | OUTPATIENT
Start: 2024-12-16

## 2024-12-17 RX ORDER — LOSARTAN POTASSIUM 25 MG/1
25 TABLET ORAL DAILY
Qty: 90 TABLET | Refills: 1 | OUTPATIENT
Start: 2024-12-17

## 2025-06-23 ENCOUNTER — TRANSCRIBE ORDERS (OUTPATIENT)
Dept: ADMINISTRATIVE | Facility: HOSPITAL | Age: 60
End: 2025-06-23
Payer: COMMERCIAL

## 2025-06-23 DIAGNOSIS — Z12.31 ENCOUNTER FOR SCREENING MAMMOGRAM FOR BREAST CANCER: Primary | ICD-10-CM

## 2025-06-25 ENCOUNTER — HOSPITAL ENCOUNTER (OUTPATIENT)
Dept: MAMMOGRAPHY | Facility: HOSPITAL | Age: 60
Discharge: HOME | End: 2025-06-25
Admitting: FAMILY MEDICINE
Payer: COMMERCIAL

## 2025-06-25 DIAGNOSIS — Z12.31 ENCOUNTER FOR SCREENING MAMMOGRAM FOR BREAST CANCER: ICD-10-CM

## 2025-06-25 PROCEDURE — 77067 SCR MAMMO BI INCL CAD: CPT

## 2025-06-25 PROCEDURE — 77063 BREAST TOMOSYNTHESIS BI: CPT

## (undated) DEVICE — ENDOPATH XCEL UNIVERSAL TROCAR STABLILITY SLEEVES: Brand: ENDOPATH XCEL

## (undated) DEVICE — GUIDE CATHETER: Brand: MACH1™

## (undated) DEVICE — CATH IMG DRAGONFLY OPTIS 2.7F 135CM

## (undated) DEVICE — GLV SURG SIGNATURE TOUCH PF LTX 6.5 STRL BX/50

## (undated) DEVICE — TREK CORONARY DILATATION CATHETER 2.50 MM X 20 MM / RAPID-EXCHANGE: Brand: TREK

## (undated) DEVICE — ADHS SKIN PREMIERPRO EXOFIN TOPICAL HI/VISC .5ML

## (undated) DEVICE — PK LAP HYST 10

## (undated) DEVICE — SUT VIC 0 TIES 18IN J912G

## (undated) DEVICE — TRENDELENBURG WINGPAD POSITIONING KIT DELUXE - WITHOUT BODY STRAP: Brand: SOULE MEDICAL

## (undated) DEVICE — ENDOPATH XCEL BLADELESS TROCARS WITH STABILITY SLEEVES: Brand: ENDOPATH XCEL

## (undated) DEVICE — Device

## (undated) DEVICE — SCRB SURG BACTOSHIELD CHG 4PCT 4OZ

## (undated) DEVICE — TR BAND RADIAL ARTERY COMPRESSION DEVICE: Brand: TR BAND

## (undated) DEVICE — LAPAROSCOPIC SMOKE FILTRATION SYSTEM: Brand: PALL LAPAROSHIELD® PLUS LAPAROSCOPIC SMOKE FILTRATION SYSTEM

## (undated) DEVICE — ABSORBABLE SINGLE STITCH RELOAD: Brand: POLYSORB

## (undated) DEVICE — GW INQWIRE FC PTFE STD J/1.5 .035 260

## (undated) DEVICE — LAP PORT CLOSURE GUIDES 5MM AND 10/12MM: Brand: LAP PORT CLOSURE GUIDES 5MM AND 10/12MM

## (undated) DEVICE — SUT MNCRYL PLS ANTIB UD 4/0 PS2 18IN

## (undated) DEVICE — GLV SURG SIGNATURE ESSENTIAL PF LTX SZ7

## (undated) DEVICE — APPL CHLORAPREP TINTED 26ML TEAL

## (undated) DEVICE — RUNTHROUGH NS EXTRA FLOPPY PTCA GUIDEWIRE: Brand: RUNTHROUGH

## (undated) DEVICE — GW EMR FIX EXCHG J STD .035 3MM 260CM

## (undated) DEVICE — RADIFOCUS OPTITORQUE ANGIOGRAPHIC CATHETER: Brand: OPTITORQUE

## (undated) DEVICE — NC TREK CORONARY DILATATION CATHETER 3.25 MM X 15 MM / RAPID-EXCHANGE: Brand: NC TREK

## (undated) DEVICE — CVR HNDL LIGHT RIGID

## (undated) DEVICE — ANTIBACTERIAL UNDYED BRAIDED (POLYGLACTIN 910), SYNTHETIC ABSORBABLE SUTURE: Brand: COATED VICRYL

## (undated) DEVICE — [HIGH FLOW INSUFFLATOR,  DO NOT USE IF PACKAGE IS DAMAGED,  KEEP DRY,  KEEP AWAY FROM SUNLIGHT,  PROTECT FROM HEAT AND RADIOACTIVE SOURCES.]: Brand: PNEUMOSURE

## (undated) DEVICE — CATH F6 ST JR 4 100CM: Brand: SUPERTORQUE

## (undated) DEVICE — CVR PROB ULTRASND GLS STRL

## (undated) DEVICE — SYR LUERLOK 50ML

## (undated) DEVICE — SUTURING DEVICE: Brand: ENDO STITCH

## (undated) DEVICE — ASMBL SPK CONTRST CONTRL

## (undated) DEVICE — VIOLET POLYDIOXANONE POLYMER, SYNTHETIC ABSORBABLE SUTURE CLIPS: Brand: LAPRA-TY

## (undated) DEVICE — MANIP UTER RUMI TP 6.7MMX8CM BLU

## (undated) DEVICE — ENDOCUT SCISSOR TIP, DISPOSABLE: Brand: RENEW

## (undated) DEVICE — MANIP UTER RUMI 2 KOH EFFICIENT SS CP 3.5CM

## (undated) DEVICE — CONTRL CONTRST CHMBRD W/TBG72IN REUS

## (undated) DEVICE — HARMONIC ACE +7 LAPAROSCOPIC SHEARS ADVANCED HEMOSTASIS 5MM DIAMETER 36CM SHAFT LENGTH  FOR USE WITH GRAY HAND PIECE ONLY: Brand: HARMONIC ACE

## (undated) DEVICE — GLV SURG SENSICARE PI ORTHO SZ8 LF STRL

## (undated) DEVICE — 12CC CONTROL SYRINGE – FR/TR/RA W/RESERVOIR: Brand: CONTROL SYRINGE

## (undated) DEVICE — GLIDESHEATH SLENDER STAINLESS STEEL KIT: Brand: GLIDESHEATH SLENDER

## (undated) DEVICE — INFLATION DEVICE KIT: Brand: ENCORE™ 26 ADVANTAGE KIT